# Patient Record
Sex: FEMALE | Race: WHITE | NOT HISPANIC OR LATINO | Employment: FULL TIME | URBAN - METROPOLITAN AREA
[De-identification: names, ages, dates, MRNs, and addresses within clinical notes are randomized per-mention and may not be internally consistent; named-entity substitution may affect disease eponyms.]

---

## 2017-10-19 ENCOUNTER — CONVERSION ENCOUNTER (OUTPATIENT)
Dept: OTHER | Facility: HOSPITAL | Age: 42
End: 2017-10-19

## 2018-05-31 ENCOUNTER — OFFICE VISIT CONVERTED (OUTPATIENT)
Dept: FAMILY MEDICINE CLINIC | Age: 43
End: 2018-05-31
Attending: NURSE PRACTITIONER

## 2018-06-28 ENCOUNTER — OFFICE VISIT CONVERTED (OUTPATIENT)
Dept: FAMILY MEDICINE CLINIC | Age: 43
End: 2018-06-28
Attending: NURSE PRACTITIONER

## 2018-07-12 ENCOUNTER — OFFICE VISIT CONVERTED (OUTPATIENT)
Dept: FAMILY MEDICINE CLINIC | Age: 43
End: 2018-07-12
Attending: NURSE PRACTITIONER

## 2018-07-18 ENCOUNTER — CONVERSION ENCOUNTER (OUTPATIENT)
Dept: CARDIOLOGY | Facility: CLINIC | Age: 43
End: 2018-07-18
Attending: INTERNAL MEDICINE

## 2018-10-02 ENCOUNTER — OFFICE VISIT CONVERTED (OUTPATIENT)
Dept: CARDIOLOGY | Facility: CLINIC | Age: 43
End: 2018-10-02
Attending: INTERNAL MEDICINE

## 2018-10-27 ENCOUNTER — OFFICE VISIT CONVERTED (OUTPATIENT)
Dept: FAMILY MEDICINE CLINIC | Age: 43
End: 2018-10-27
Attending: NURSE PRACTITIONER

## 2018-10-30 ENCOUNTER — OFFICE VISIT CONVERTED (OUTPATIENT)
Dept: FAMILY MEDICINE CLINIC | Age: 43
End: 2018-10-30
Attending: NURSE PRACTITIONER

## 2019-01-02 ENCOUNTER — CONVERSION ENCOUNTER (OUTPATIENT)
Dept: OTOLARYNGOLOGY | Facility: CLINIC | Age: 44
End: 2019-01-02

## 2019-01-02 ENCOUNTER — OFFICE VISIT CONVERTED (OUTPATIENT)
Dept: OTOLARYNGOLOGY | Facility: CLINIC | Age: 44
End: 2019-01-02
Attending: OTOLARYNGOLOGY

## 2019-02-27 ENCOUNTER — HOSPITAL ENCOUNTER (OUTPATIENT)
Dept: OTHER | Facility: HOSPITAL | Age: 44
Discharge: HOME | End: 2019-02-27

## 2019-03-08 ENCOUNTER — HOSPITAL ENCOUNTER (OUTPATIENT)
Dept: OTHER | Facility: HOSPITAL | Age: 44
Discharge: HOME | End: 2019-03-08

## 2019-03-08 ENCOUNTER — OFFICE VISIT CONVERTED (OUTPATIENT)
Dept: FAMILY MEDICINE CLINIC | Age: 44
End: 2019-03-08
Attending: NURSE PRACTITIONER

## 2019-03-08 LAB
25(OH)D3 SERPL-MCNC: 49 NG/ML (ref 30–100)
ALBUMIN SERPL-MCNC: 4 G/DL (ref 3.5–5)
ALBUMIN/GLOB SERPL: 1.3 {RATIO} (ref 1.4–2.6)
ALP SERPL-CCNC: 91 U/L (ref 42–98)
ALT SERPL-CCNC: 16 U/L (ref 10–40)
ANION GAP SERPL CALC-SCNC: 15 MMOL/L (ref 8–19)
AST SERPL-CCNC: 18 U/L (ref 15–50)
BASOPHILS # BLD MANUAL: 0.09 10*3/UL (ref 0–0.2)
BASOPHILS NFR BLD MANUAL: 0.7 % (ref 0–3)
BILIRUB SERPL-MCNC: 0.25 MG/DL (ref 0.2–1.3)
BUN SERPL-MCNC: 18 MG/DL (ref 5–25)
BUN/CREAT SERPL: 18 {RATIO} (ref 6–20)
CALCIUM SERPL-MCNC: 9.7 MG/DL (ref 8.7–10.4)
CHLORIDE SERPL-SCNC: 104 MMOL/L (ref 99–111)
CHOLEST SERPL-MCNC: 169 MG/DL (ref 107–200)
CHOLEST/HDLC SERPL: 3.4 {RATIO} (ref 3–6)
CONV CO2: 22 MMOL/L (ref 22–32)
CONV TOTAL PROTEIN: 7.2 G/DL (ref 6.3–8.2)
CREAT UR-MCNC: 0.99 MG/DL (ref 0.5–0.9)
DEPRECATED RDW RBC AUTO: 45.5 FL
EOSINOPHIL # BLD MANUAL: 0.37 10*3/UL (ref 0–0.7)
EOSINOPHIL NFR BLD MANUAL: 2.7 % (ref 0–7)
ERYTHROCYTE [DISTWIDTH] IN BLOOD BY AUTOMATED COUNT: 13.5 % (ref 11.5–14.5)
GFR SERPLBLD BASED ON 1.73 SQ M-ARVRAT: >60 ML/MIN/{1.73_M2}
GLOBULIN UR ELPH-MCNC: 3.2 G/DL (ref 2–3.5)
GLUCOSE SERPL-MCNC: 108 MG/DL (ref 65–99)
GRANS (ABSOLUTE): 9.39 10*3/UL (ref 2–8)
GRANS: 68.3 % (ref 30–85)
HBA1C MFR BLD: 15.3 G/DL (ref 12–16)
HCT VFR BLD AUTO: 44.8 % (ref 37–47)
HDLC SERPL-MCNC: 49 MG/DL (ref 40–60)
IMM GRANULOCYTES # BLD: 0.11 10*3/UL (ref 0–0.54)
IMM GRANULOCYTES NFR BLD: 0.8 % (ref 0–0.43)
LDLC SERPL CALC-MCNC: 86 MG/DL (ref 70–100)
LYMPHOCYTES # BLD MANUAL: 2.91 10*3/UL (ref 1–5)
LYMPHOCYTES NFR BLD MANUAL: 6.3 % (ref 3–10)
MAGNESIUM SERPL-MCNC: 1.67 MG/DL (ref 1.6–2.3)
MCH RBC QN AUTO: 30.5 PG (ref 27–31)
MCHC RBC AUTO-ENTMCNC: 34.2 G/DL (ref 33–37)
MCV RBC AUTO: 89.4 FL (ref 81–99)
MONOCYTES # BLD AUTO: 0.86 10*3/UL (ref 0.2–1.2)
OSMOLALITY SERPL CALC.SUM OF ELEC: 286 MOSM/KG (ref 273–304)
PLATELET # BLD AUTO: 233 10*3/UL (ref 130–400)
PMV BLD AUTO: 11.1 FL (ref 7.4–10.4)
POTASSIUM SERPL-SCNC: 4.4 MMOL/L (ref 3.5–5.3)
RBC # BLD AUTO: 5.01 10*6/UL (ref 4.2–5.4)
SODIUM SERPL-SCNC: 137 MMOL/L (ref 135–147)
TRIGL SERPL-MCNC: 169 MG/DL (ref 40–150)
TSH SERPL-ACNC: 0.88 M[IU]/L (ref 0.27–4.2)
VARIANT LYMPHS NFR BLD MANUAL: 21.2 % (ref 20–45)
VLDLC SERPL-MCNC: 34 MG/DL (ref 5–37)
WBC # BLD AUTO: 13.73 10*3/UL (ref 4.8–10.8)

## 2019-05-29 ENCOUNTER — HOSPITAL ENCOUNTER (OUTPATIENT)
Dept: OTHER | Facility: HOSPITAL | Age: 44
Discharge: HOME | End: 2019-05-29

## 2019-05-29 ENCOUNTER — OFFICE VISIT CONVERTED (OUTPATIENT)
Dept: FAMILY MEDICINE CLINIC | Age: 44
End: 2019-05-29
Attending: NURSE PRACTITIONER

## 2019-06-18 ENCOUNTER — HOSPITAL ENCOUNTER (OUTPATIENT)
Dept: OTHER | Facility: HOSPITAL | Age: 44
Discharge: HOME | End: 2019-06-18

## 2019-06-18 LAB
T4 FREE SERPL-MCNC: 1.6 NG/DL (ref 0.9–1.8)
TSH SERPL-ACNC: 1.36 M[IU]/L (ref 0.27–4.2)

## 2019-11-15 ENCOUNTER — HOSPITAL ENCOUNTER (OUTPATIENT)
Dept: MRI IMAGING | Facility: HOSPITAL | Age: 44
Discharge: HOME | End: 2019-11-15

## 2019-12-10 ENCOUNTER — OFFICE VISIT CONVERTED (OUTPATIENT)
Dept: FAMILY MEDICINE CLINIC | Age: 44
End: 2019-12-10
Attending: NURSE PRACTITIONER

## 2019-12-11 ENCOUNTER — HOSPITAL ENCOUNTER (OUTPATIENT)
Dept: OTHER | Facility: HOSPITAL | Age: 44
Discharge: HOME | End: 2019-12-11
Attending: NURSE PRACTITIONER

## 2019-12-13 LAB — BACTERIA SPEC AEROBE CULT: NORMAL

## 2020-06-08 ENCOUNTER — OFFICE VISIT CONVERTED (OUTPATIENT)
Dept: FAMILY MEDICINE CLINIC | Age: 45
End: 2020-06-08
Attending: NURSE PRACTITIONER

## 2020-06-08 ENCOUNTER — HOSPITAL ENCOUNTER (OUTPATIENT)
Dept: OTHER | Facility: HOSPITAL | Age: 45
Discharge: HOME | End: 2020-06-08
Attending: NURSE PRACTITIONER

## 2020-06-08 LAB
ALBUMIN SERPL-MCNC: 4 G/DL (ref 3.5–5)
ALBUMIN/GLOB SERPL: 1.3 {RATIO} (ref 1.4–2.6)
ALP SERPL-CCNC: 115 U/L (ref 42–98)
ALT SERPL-CCNC: 22 U/L (ref 10–40)
ANION GAP SERPL CALC-SCNC: 17 MMOL/L (ref 8–19)
AST SERPL-CCNC: 25 U/L (ref 15–50)
BASOPHILS # BLD MANUAL: 0.09 10*3/UL (ref 0–0.2)
BASOPHILS NFR BLD MANUAL: 1 % (ref 0–3)
BILIRUB SERPL-MCNC: 0.32 MG/DL (ref 0.2–1.3)
BUN SERPL-MCNC: 13 MG/DL (ref 5–25)
BUN/CREAT SERPL: 15 {RATIO} (ref 6–20)
CALCIUM SERPL-MCNC: 9.4 MG/DL (ref 8.7–10.4)
CHLORIDE SERPL-SCNC: 103 MMOL/L (ref 99–111)
CHOLEST SERPL-MCNC: 156 MG/DL (ref 107–200)
CHOLEST/HDLC SERPL: 3.8 {RATIO} (ref 3–6)
CONV CO2: 21 MMOL/L (ref 22–32)
CONV TOTAL PROTEIN: 7.2 G/DL (ref 6.3–8.2)
CREAT UR-MCNC: 0.87 MG/DL (ref 0.5–0.9)
DEPRECATED RDW RBC AUTO: 45.6 FL
EOSINOPHIL # BLD MANUAL: 0.34 10*3/UL (ref 0–0.7)
EOSINOPHIL NFR BLD MANUAL: 3.6 % (ref 0–7)
ERYTHROCYTE [DISTWIDTH] IN BLOOD BY AUTOMATED COUNT: 14 % (ref 11.5–14.5)
GFR SERPLBLD BASED ON 1.73 SQ M-ARVRAT: >60 ML/MIN/{1.73_M2}
GLOBULIN UR ELPH-MCNC: 3.2 G/DL (ref 2–3.5)
GLUCOSE SERPL-MCNC: 107 MG/DL (ref 65–99)
GRANS (ABSOLUTE): 5.63 10*3/UL (ref 2–8)
GRANS: 60.2 % (ref 30–85)
HBA1C MFR BLD: 15.4 G/DL (ref 12–16)
HCT VFR BLD AUTO: 46.8 % (ref 37–47)
HDLC SERPL-MCNC: 41 MG/DL (ref 40–60)
IMM GRANULOCYTES # BLD: 0.02 10*3/UL (ref 0–0.54)
IMM GRANULOCYTES NFR BLD: 0.2 % (ref 0–0.43)
LDLC SERPL CALC-MCNC: 92 MG/DL (ref 70–100)
LYMPHOCYTES # BLD MANUAL: 2.72 10*3/UL (ref 1–5)
LYMPHOCYTES NFR BLD MANUAL: 5.9 % (ref 3–10)
MCH RBC QN AUTO: 29.2 PG (ref 27–31)
MCHC RBC AUTO-ENTMCNC: 32.9 G/DL (ref 33–37)
MCV RBC AUTO: 88.6 FL (ref 81–99)
MONOCYTES # BLD AUTO: 0.55 10*3/UL (ref 0.2–1.2)
OSMOLALITY SERPL CALC.SUM OF ELEC: 285 MOSM/KG (ref 273–304)
PLATELET # BLD AUTO: 236 10*3/UL (ref 130–400)
PMV BLD AUTO: 11.4 FL (ref 7.4–10.4)
POTASSIUM SERPL-SCNC: 4.2 MMOL/L (ref 3.5–5.3)
RBC # BLD AUTO: 5.28 10*6/UL (ref 4.2–5.4)
SODIUM SERPL-SCNC: 137 MMOL/L (ref 135–147)
TRIGL SERPL-MCNC: 116 MG/DL (ref 40–150)
TSH SERPL-ACNC: 0.29 M[IU]/L (ref 0.27–4.2)
VARIANT LYMPHS NFR BLD MANUAL: 29.1 % (ref 20–45)
VLDLC SERPL-MCNC: 23 MG/DL (ref 5–37)
WBC # BLD AUTO: 9.35 10*3/UL (ref 4.8–10.8)

## 2020-06-17 ENCOUNTER — OFFICE VISIT CONVERTED (OUTPATIENT)
Dept: FAMILY MEDICINE CLINIC | Age: 45
End: 2020-06-17
Attending: NURSE PRACTITIONER

## 2020-07-27 ENCOUNTER — OFFICE VISIT CONVERTED (OUTPATIENT)
Dept: FAMILY MEDICINE CLINIC | Age: 45
End: 2020-07-27
Attending: FAMILY MEDICINE

## 2020-07-27 ENCOUNTER — HOSPITAL ENCOUNTER (OUTPATIENT)
Dept: OTHER | Facility: HOSPITAL | Age: 45
Discharge: HOME | End: 2020-07-27
Attending: FAMILY MEDICINE

## 2020-07-27 LAB
APPEARANCE UR: CLEAR
BILIRUB UR QL: NEGATIVE
COLOR UR: ABNORMAL
CONV BACTERIA: NEGATIVE
CONV COLLECTION SOURCE (UA): ABNORMAL
CONV UROBILINOGEN IN URINE BY AUTOMATED TEST STRIP: 1 {EHRLICHU}/DL (ref 0.1–1)
GLUCOSE UR QL: NEGATIVE MG/DL
HGB UR QL STRIP: ABNORMAL
KETONES UR QL STRIP: NEGATIVE MG/DL
LEUKOCYTE ESTERASE UR QL STRIP: ABNORMAL
NITRITE UR QL STRIP: POSITIVE
PH UR STRIP.AUTO: 6.5 [PH] (ref 5–8)
PROT UR QL: NEGATIVE MG/DL
RBC #/AREA URNS HPF: ABNORMAL /[HPF]
SP GR UR: 1 (ref 1–1.03)
SQUAMOUS SPT QL MICRO: ABNORMAL /[HPF]
WBC #/AREA URNS HPF: ABNORMAL /[HPF]

## 2020-07-30 LAB — BACTERIA UR CULT: NORMAL

## 2020-10-19 ENCOUNTER — HOSPITAL ENCOUNTER (OUTPATIENT)
Dept: OTHER | Facility: HOSPITAL | Age: 45
Discharge: HOME | End: 2020-10-19
Attending: NURSE PRACTITIONER

## 2020-10-19 ENCOUNTER — OFFICE VISIT CONVERTED (OUTPATIENT)
Dept: FAMILY MEDICINE CLINIC | Age: 45
End: 2020-10-19
Attending: NURSE PRACTITIONER

## 2020-10-21 LAB
ALP SERPL-CCNC: 106 U/L (ref 42–98)
ASO AB SERPL-ACNC: 36 [IU]/ML (ref 0–200)
CALCIUM SERPL-MCNC: 9 MG/DL (ref 8.7–10.4)
CONV RHEUMATOID FACTOR IGM: <10 [IU]/ML (ref 0–14)
CRP SERPL-MCNC: 6.3 MG/L (ref 0–5)
DSDNA AB SER-ACNC: NEGATIVE [IU]/ML
ENA AB SER IA-ACNC: NEGATIVE {RATIO}
ERYTHROCYTE [SEDIMENTATION RATE] IN BLOOD: 5 MM/H (ref 0–20)
PHOSPHATE SERPL-MCNC: 2.7 MG/DL (ref 2.4–4.5)
URATE SERPL-MCNC: 6.1 MG/DL (ref 2.5–7.5)

## 2020-10-29 ENCOUNTER — HOSPITAL ENCOUNTER (OUTPATIENT)
Dept: PHYSICAL THERAPY | Facility: CLINIC | Age: 45
Setting detail: RECURRING SERIES
Discharge: HOME | End: 2021-01-18
Attending: NURSE PRACTITIONER

## 2020-11-11 ENCOUNTER — OFFICE VISIT CONVERTED (OUTPATIENT)
Dept: FAMILY MEDICINE CLINIC | Age: 45
End: 2020-11-11
Attending: NURSE PRACTITIONER

## 2020-11-30 ENCOUNTER — OFFICE VISIT (OUTPATIENT)
Dept: ORTHOPEDIC SURGERY | Facility: CLINIC | Age: 45
End: 2020-11-30

## 2020-11-30 VITALS — WEIGHT: 270 LBS | BODY MASS INDEX: 43.39 KG/M2 | HEIGHT: 66 IN | TEMPERATURE: 98.2 F

## 2020-11-30 DIAGNOSIS — S83.222A PERIPHERAL TEAR OF MEDIAL MENISCUS OF LEFT KNEE AS CURRENT INJURY, INITIAL ENCOUNTER: Primary | ICD-10-CM

## 2020-11-30 PROCEDURE — 99204 OFFICE O/P NEW MOD 45 MIN: CPT | Performed by: ORTHOPAEDIC SURGERY

## 2020-11-30 RX ORDER — TRAMADOL HYDROCHLORIDE 50 MG/1
TABLET ORAL
COMMUNITY
End: 2021-01-27

## 2020-11-30 RX ORDER — DULOXETIN HYDROCHLORIDE 60 MG/1
60 CAPSULE, DELAYED RELEASE ORAL DAILY
COMMUNITY
Start: 2020-11-10 | End: 2021-12-07

## 2020-11-30 RX ORDER — TIZANIDINE 2 MG/1
2 TABLET ORAL AS NEEDED
COMMUNITY
End: 2022-08-01 | Stop reason: RX

## 2020-11-30 RX ORDER — LEVOTHYROXINE SODIUM 0.15 MG/1
150 TABLET ORAL DAILY
COMMUNITY
End: 2022-02-25 | Stop reason: SDUPTHER

## 2020-11-30 RX ORDER — OMEGA-3 FATTY ACIDS/FISH OIL 300-1000MG
800 CAPSULE ORAL AS NEEDED
COMMUNITY
End: 2021-11-01

## 2020-11-30 RX ORDER — DOXYCYCLINE 100 MG/1
CAPSULE ORAL
COMMUNITY
End: 2021-01-27

## 2020-11-30 RX ORDER — MELOXICAM 7.5 MG/1
7.5 TABLET ORAL DAILY
COMMUNITY
Start: 2020-11-20 | End: 2021-05-13

## 2020-11-30 RX ORDER — ESTRADIOL 0.1 MG/D
1 FILM, EXTENDED RELEASE TRANSDERMAL 2 TIMES WEEKLY
COMMUNITY
Start: 2020-11-28

## 2020-12-01 PROBLEM — S83.222A PERIPHERAL TEAR OF MEDIAL MENISCUS OF LEFT KNEE AS CURRENT INJURY: Status: ACTIVE | Noted: 2020-12-01

## 2020-12-01 RX ORDER — CEFAZOLIN SODIUM 2 G/100ML
2 INJECTION, SOLUTION INTRAVENOUS ONCE
Status: CANCELLED | OUTPATIENT
Start: 2021-01-29 | End: 2020-12-01

## 2021-01-27 ENCOUNTER — APPOINTMENT (OUTPATIENT)
Dept: PREADMISSION TESTING | Facility: HOSPITAL | Age: 46
End: 2021-01-27

## 2021-01-27 VITALS
DIASTOLIC BLOOD PRESSURE: 86 MMHG | OXYGEN SATURATION: 98 % | WEIGHT: 285.3 LBS | BODY MASS INDEX: 45.85 KG/M2 | TEMPERATURE: 98.5 F | HEART RATE: 98 BPM | RESPIRATION RATE: 16 BRPM | HEIGHT: 66 IN | SYSTOLIC BLOOD PRESSURE: 130 MMHG

## 2021-01-27 DIAGNOSIS — S83.222A PERIPHERAL TEAR OF MEDIAL MENISCUS OF LEFT KNEE AS CURRENT INJURY, INITIAL ENCOUNTER: ICD-10-CM

## 2021-01-27 LAB
ANION GAP SERPL CALCULATED.3IONS-SCNC: 9 MMOL/L (ref 5–15)
BILIRUB UR QL STRIP: NEGATIVE
BUN SERPL-MCNC: 16 MG/DL (ref 6–20)
BUN/CREAT SERPL: 19 (ref 7–25)
CALCIUM SPEC-SCNC: 9.4 MG/DL (ref 8.6–10.5)
CHLORIDE SERPL-SCNC: 106 MMOL/L (ref 98–107)
CLARITY UR: CLEAR
CO2 SERPL-SCNC: 25 MMOL/L (ref 22–29)
COLOR UR: YELLOW
CREAT SERPL-MCNC: 0.84 MG/DL (ref 0.57–1)
DEPRECATED RDW RBC AUTO: 43.2 FL (ref 37–54)
ERYTHROCYTE [DISTWIDTH] IN BLOOD BY AUTOMATED COUNT: 13.1 % (ref 12.3–15.4)
GFR SERPL CREATININE-BSD FRML MDRD: 73 ML/MIN/1.73
GLUCOSE SERPL-MCNC: 112 MG/DL (ref 65–99)
GLUCOSE UR STRIP-MCNC: NEGATIVE MG/DL
HCT VFR BLD AUTO: 43.8 % (ref 34–46.6)
HGB BLD-MCNC: 14.6 G/DL (ref 12–15.9)
HGB UR QL STRIP.AUTO: NEGATIVE
KETONES UR QL STRIP: ABNORMAL
LEUKOCYTE ESTERASE UR QL STRIP.AUTO: NEGATIVE
MCH RBC QN AUTO: 30.1 PG (ref 26.6–33)
MCHC RBC AUTO-ENTMCNC: 33.3 G/DL (ref 31.5–35.7)
MCV RBC AUTO: 90.3 FL (ref 79–97)
NITRITE UR QL STRIP: NEGATIVE
PH UR STRIP.AUTO: 6 [PH] (ref 5–8)
PLATELET # BLD AUTO: 239 10*3/MM3 (ref 140–450)
PMV BLD AUTO: 11.5 FL (ref 6–12)
POTASSIUM SERPL-SCNC: 4.9 MMOL/L (ref 3.5–5.2)
PROT UR QL STRIP: NEGATIVE
RBC # BLD AUTO: 4.85 10*6/MM3 (ref 3.77–5.28)
SODIUM SERPL-SCNC: 140 MMOL/L (ref 136–145)
SP GR UR STRIP: 1.02 (ref 1–1.03)
UROBILINOGEN UR QL STRIP: ABNORMAL
WBC # BLD AUTO: 7.94 10*3/MM3 (ref 3.4–10.8)

## 2021-01-27 PROCEDURE — 80048 BASIC METABOLIC PNL TOTAL CA: CPT

## 2021-01-27 PROCEDURE — 36415 COLL VENOUS BLD VENIPUNCTURE: CPT

## 2021-01-27 PROCEDURE — U0004 COV-19 TEST NON-CDC HGH THRU: HCPCS

## 2021-01-27 PROCEDURE — C9803 HOPD COVID-19 SPEC COLLECT: HCPCS

## 2021-01-27 PROCEDURE — 85027 COMPLETE CBC AUTOMATED: CPT

## 2021-01-27 PROCEDURE — 81003 URINALYSIS AUTO W/O SCOPE: CPT

## 2021-01-28 LAB — SARS-COV-2 RNA RESP QL NAA+PROBE: NOT DETECTED

## 2021-01-29 ENCOUNTER — ANESTHESIA (OUTPATIENT)
Dept: PERIOP | Facility: HOSPITAL | Age: 46
End: 2021-01-29

## 2021-01-29 ENCOUNTER — HOSPITAL ENCOUNTER (OUTPATIENT)
Facility: HOSPITAL | Age: 46
Setting detail: HOSPITAL OUTPATIENT SURGERY
Discharge: HOME | End: 2021-01-29
Attending: ORTHOPAEDIC SURGERY | Admitting: ORTHOPAEDIC SURGERY

## 2021-01-29 ENCOUNTER — ANESTHESIA EVENT (OUTPATIENT)
Dept: PERIOP | Facility: HOSPITAL | Age: 46
End: 2021-01-29

## 2021-01-29 VITALS
TEMPERATURE: 97.8 F | SYSTOLIC BLOOD PRESSURE: 116 MMHG | OXYGEN SATURATION: 98 % | DIASTOLIC BLOOD PRESSURE: 82 MMHG | HEART RATE: 108 BPM | RESPIRATION RATE: 16 BRPM

## 2021-01-29 DIAGNOSIS — S83.222A PERIPHERAL TEAR OF MEDIAL MENISCUS OF LEFT KNEE AS CURRENT INJURY, INITIAL ENCOUNTER: ICD-10-CM

## 2021-01-29 DIAGNOSIS — Z98.890 S/P ARTHROSCOPIC KNEE SURGERY: Primary | ICD-10-CM

## 2021-01-29 PROCEDURE — 25010000002 DEXAMETHASONE PER 1 MG: Performed by: NURSE ANESTHETIST, CERTIFIED REGISTERED

## 2021-01-29 PROCEDURE — 25010000002 PROPOFOL 10 MG/ML EMULSION: Performed by: NURSE ANESTHETIST, CERTIFIED REGISTERED

## 2021-01-29 PROCEDURE — S0260 H&P FOR SURGERY: HCPCS | Performed by: ORTHOPAEDIC SURGERY

## 2021-01-29 PROCEDURE — 29881 ARTHRS KNE SRG MNISECTMY M/L: CPT | Performed by: ORTHOPAEDIC SURGERY

## 2021-01-29 PROCEDURE — 25010000002 FENTANYL CITRATE (PF) 100 MCG/2ML SOLUTION: Performed by: NURSE ANESTHETIST, CERTIFIED REGISTERED

## 2021-01-29 PROCEDURE — 25010000003 BUPIVACAINE LIPOSOME 1.3 % SUSPENSION: Performed by: ORTHOPAEDIC SURGERY

## 2021-01-29 PROCEDURE — 25010000002 MIDAZOLAM PER 1 MG: Performed by: ANESTHESIOLOGY

## 2021-01-29 PROCEDURE — 25010000003 CEFAZOLIN IN DEXTROSE 2-4 GM/100ML-% SOLUTION: Performed by: ORTHOPAEDIC SURGERY

## 2021-01-29 PROCEDURE — C9290 INJ, BUPIVACAINE LIPOSOME: HCPCS | Performed by: ORTHOPAEDIC SURGERY

## 2021-01-29 PROCEDURE — 25010000002 SUCCINYLCHOLINE PER 20 MG: Performed by: NURSE ANESTHETIST, CERTIFIED REGISTERED

## 2021-01-29 PROCEDURE — 25010000002 PHENYLEPHRINE PER 1 ML: Performed by: NURSE ANESTHETIST, CERTIFIED REGISTERED

## 2021-01-29 PROCEDURE — 25010000002 ONDANSETRON PER 1 MG: Performed by: NURSE ANESTHETIST, CERTIFIED REGISTERED

## 2021-01-29 PROCEDURE — 25010000002 HYDROMORPHONE PER 4 MG: Performed by: NURSE ANESTHETIST, CERTIFIED REGISTERED

## 2021-01-29 RX ORDER — PROMETHAZINE HYDROCHLORIDE 25 MG/1
25 SUPPOSITORY RECTAL ONCE AS NEEDED
Status: DISCONTINUED | OUTPATIENT
Start: 2021-01-29 | End: 2021-01-29 | Stop reason: HOSPADM

## 2021-01-29 RX ORDER — FENTANYL CITRATE 50 UG/ML
INJECTION, SOLUTION INTRAMUSCULAR; INTRAVENOUS AS NEEDED
Status: DISCONTINUED | OUTPATIENT
Start: 2021-01-29 | End: 2021-01-29 | Stop reason: SURG

## 2021-01-29 RX ORDER — HYDROMORPHONE HCL 110MG/55ML
PATIENT CONTROLLED ANALGESIA SYRINGE INTRAVENOUS AS NEEDED
Status: DISCONTINUED | OUTPATIENT
Start: 2021-01-29 | End: 2021-01-29 | Stop reason: SURG

## 2021-01-29 RX ORDER — NALOXONE HCL 0.4 MG/ML
0.2 VIAL (ML) INJECTION AS NEEDED
Status: DISCONTINUED | OUTPATIENT
Start: 2021-01-29 | End: 2021-01-29 | Stop reason: HOSPADM

## 2021-01-29 RX ORDER — HYDROCODONE BITARTRATE AND ACETAMINOPHEN 7.5; 325 MG/1; MG/1
1 TABLET ORAL ONCE AS NEEDED
Status: COMPLETED | OUTPATIENT
Start: 2021-01-29 | End: 2021-01-29

## 2021-01-29 RX ORDER — IPRATROPIUM BROMIDE AND ALBUTEROL SULFATE 2.5; .5 MG/3ML; MG/3ML
3 SOLUTION RESPIRATORY (INHALATION) ONCE AS NEEDED
Status: DISCONTINUED | OUTPATIENT
Start: 2021-01-29 | End: 2021-01-29 | Stop reason: HOSPADM

## 2021-01-29 RX ORDER — SODIUM CHLORIDE, SODIUM LACTATE, POTASSIUM CHLORIDE, CALCIUM CHLORIDE 600; 310; 30; 20 MG/100ML; MG/100ML; MG/100ML; MG/100ML
9 INJECTION, SOLUTION INTRAVENOUS CONTINUOUS
Status: DISCONTINUED | OUTPATIENT
Start: 2021-01-29 | End: 2021-01-29 | Stop reason: HOSPADM

## 2021-01-29 RX ORDER — HYDROCODONE BITARTRATE AND ACETAMINOPHEN 7.5; 325 MG/1; MG/1
1 TABLET ORAL EVERY 6 HOURS PRN
Qty: 40 TABLET | Refills: 0 | Status: SHIPPED | OUTPATIENT
Start: 2021-01-29 | End: 2021-02-05

## 2021-01-29 RX ORDER — SUCCINYLCHOLINE CHLORIDE 20 MG/ML
INJECTION INTRAMUSCULAR; INTRAVENOUS AS NEEDED
Status: DISCONTINUED | OUTPATIENT
Start: 2021-01-29 | End: 2021-01-29 | Stop reason: SURG

## 2021-01-29 RX ORDER — DEXAMETHASONE SODIUM PHOSPHATE 10 MG/ML
INJECTION INTRAMUSCULAR; INTRAVENOUS AS NEEDED
Status: DISCONTINUED | OUTPATIENT
Start: 2021-01-29 | End: 2021-01-29 | Stop reason: SURG

## 2021-01-29 RX ORDER — HYDROMORPHONE HYDROCHLORIDE 1 MG/ML
0.5 INJECTION, SOLUTION INTRAMUSCULAR; INTRAVENOUS; SUBCUTANEOUS
Status: DISCONTINUED | OUTPATIENT
Start: 2021-01-29 | End: 2021-01-29 | Stop reason: HOSPADM

## 2021-01-29 RX ORDER — MIDAZOLAM HYDROCHLORIDE 1 MG/ML
1 INJECTION INTRAMUSCULAR; INTRAVENOUS
Status: DISCONTINUED | OUTPATIENT
Start: 2021-01-29 | End: 2021-01-29 | Stop reason: HOSPADM

## 2021-01-29 RX ORDER — SCOLOPAMINE TRANSDERMAL SYSTEM 1 MG/1
1 PATCH, EXTENDED RELEASE TRANSDERMAL CONTINUOUS
Status: DISCONTINUED | OUTPATIENT
Start: 2021-01-29 | End: 2021-01-29 | Stop reason: HOSPADM

## 2021-01-29 RX ORDER — FLUMAZENIL 0.1 MG/ML
0.2 INJECTION INTRAVENOUS AS NEEDED
Status: DISCONTINUED | OUTPATIENT
Start: 2021-01-29 | End: 2021-01-29 | Stop reason: HOSPADM

## 2021-01-29 RX ORDER — LIDOCAINE HYDROCHLORIDE 20 MG/ML
INJECTION, SOLUTION INFILTRATION; PERINEURAL AS NEEDED
Status: DISCONTINUED | OUTPATIENT
Start: 2021-01-29 | End: 2021-01-29 | Stop reason: SURG

## 2021-01-29 RX ORDER — ASPIRIN 325 MG
325 TABLET, DELAYED RELEASE (ENTERIC COATED) ORAL DAILY
Qty: 30 TABLET | Refills: 0 | Status: SHIPPED | OUTPATIENT
Start: 2021-01-29

## 2021-01-29 RX ORDER — FAMOTIDINE 10 MG/ML
20 INJECTION, SOLUTION INTRAVENOUS ONCE
Status: COMPLETED | OUTPATIENT
Start: 2021-01-29 | End: 2021-01-29

## 2021-01-29 RX ORDER — PROPOFOL 10 MG/ML
VIAL (ML) INTRAVENOUS AS NEEDED
Status: DISCONTINUED | OUTPATIENT
Start: 2021-01-29 | End: 2021-01-29 | Stop reason: SURG

## 2021-01-29 RX ORDER — OXYCODONE AND ACETAMINOPHEN 7.5; 325 MG/1; MG/1
1 TABLET ORAL ONCE AS NEEDED
Status: DISCONTINUED | OUTPATIENT
Start: 2021-01-29 | End: 2021-01-29 | Stop reason: HOSPADM

## 2021-01-29 RX ORDER — DIPHENHYDRAMINE HCL 25 MG
25 CAPSULE ORAL
Status: DISCONTINUED | OUTPATIENT
Start: 2021-01-29 | End: 2021-01-29 | Stop reason: HOSPADM

## 2021-01-29 RX ORDER — DOCUSATE SODIUM 100 MG/1
100 CAPSULE, LIQUID FILLED ORAL 2 TIMES DAILY
Qty: 60 CAPSULE | Refills: 0 | Status: SHIPPED | OUTPATIENT
Start: 2021-01-29 | End: 2021-11-01

## 2021-01-29 RX ORDER — SODIUM CHLORIDE 0.9 % (FLUSH) 0.9 %
3-10 SYRINGE (ML) INJECTION AS NEEDED
Status: DISCONTINUED | OUTPATIENT
Start: 2021-01-29 | End: 2021-01-29 | Stop reason: HOSPADM

## 2021-01-29 RX ORDER — LABETALOL HYDROCHLORIDE 5 MG/ML
5 INJECTION, SOLUTION INTRAVENOUS
Status: DISCONTINUED | OUTPATIENT
Start: 2021-01-29 | End: 2021-01-29 | Stop reason: HOSPADM

## 2021-01-29 RX ORDER — FENTANYL CITRATE 50 UG/ML
50 INJECTION, SOLUTION INTRAMUSCULAR; INTRAVENOUS
Status: DISCONTINUED | OUTPATIENT
Start: 2021-01-29 | End: 2021-01-29 | Stop reason: HOSPADM

## 2021-01-29 RX ORDER — CEFAZOLIN SODIUM 2 G/100ML
2 INJECTION, SOLUTION INTRAVENOUS ONCE
Status: COMPLETED | OUTPATIENT
Start: 2021-01-29 | End: 2021-01-29

## 2021-01-29 RX ORDER — SODIUM CHLORIDE 0.9 % (FLUSH) 0.9 %
3 SYRINGE (ML) INJECTION EVERY 12 HOURS SCHEDULED
Status: DISCONTINUED | OUTPATIENT
Start: 2021-01-29 | End: 2021-01-29 | Stop reason: HOSPADM

## 2021-01-29 RX ORDER — DIPHENHYDRAMINE HYDROCHLORIDE 50 MG/ML
12.5 INJECTION INTRAMUSCULAR; INTRAVENOUS
Status: DISCONTINUED | OUTPATIENT
Start: 2021-01-29 | End: 2021-01-29 | Stop reason: HOSPADM

## 2021-01-29 RX ORDER — GABAPENTIN 300 MG/1
300 CAPSULE ORAL ONCE
Status: COMPLETED | OUTPATIENT
Start: 2021-01-29 | End: 2021-01-29

## 2021-01-29 RX ORDER — MIDAZOLAM HYDROCHLORIDE 1 MG/ML
2 INJECTION INTRAMUSCULAR; INTRAVENOUS
Status: DISCONTINUED | OUTPATIENT
Start: 2021-01-29 | End: 2021-01-29 | Stop reason: HOSPADM

## 2021-01-29 RX ORDER — ACETAMINOPHEN 500 MG
500 TABLET ORAL ONCE
Status: COMPLETED | OUTPATIENT
Start: 2021-01-29 | End: 2021-01-29

## 2021-01-29 RX ORDER — GLYCOPYRROLATE 0.2 MG/ML
INJECTION INTRAMUSCULAR; INTRAVENOUS AS NEEDED
Status: DISCONTINUED | OUTPATIENT
Start: 2021-01-29 | End: 2021-01-29 | Stop reason: SURG

## 2021-01-29 RX ORDER — PROMETHAZINE HYDROCHLORIDE 25 MG/1
25 TABLET ORAL ONCE AS NEEDED
Status: DISCONTINUED | OUTPATIENT
Start: 2021-01-29 | End: 2021-01-29 | Stop reason: HOSPADM

## 2021-01-29 RX ORDER — SODIUM CHLORIDE, SODIUM LACTATE, POTASSIUM CHLORIDE, AND CALCIUM CHLORIDE .6; .31; .03; .02 G/100ML; G/100ML; G/100ML; G/100ML
IRRIGANT IRRIGATION AS NEEDED
Status: DISCONTINUED | OUTPATIENT
Start: 2021-01-29 | End: 2021-01-29 | Stop reason: HOSPADM

## 2021-01-29 RX ORDER — ONDANSETRON 2 MG/ML
4 INJECTION INTRAMUSCULAR; INTRAVENOUS ONCE AS NEEDED
Status: DISCONTINUED | OUTPATIENT
Start: 2021-01-29 | End: 2021-01-29 | Stop reason: HOSPADM

## 2021-01-29 RX ORDER — ONDANSETRON 2 MG/ML
INJECTION INTRAMUSCULAR; INTRAVENOUS AS NEEDED
Status: DISCONTINUED | OUTPATIENT
Start: 2021-01-29 | End: 2021-01-29 | Stop reason: SURG

## 2021-01-29 RX ORDER — EPHEDRINE SULFATE 50 MG/ML
5 INJECTION, SOLUTION INTRAVENOUS ONCE AS NEEDED
Status: DISCONTINUED | OUTPATIENT
Start: 2021-01-29 | End: 2021-01-29 | Stop reason: HOSPADM

## 2021-01-29 RX ADMIN — SCOPALAMINE 1 PATCH: 1 PATCH, EXTENDED RELEASE TRANSDERMAL at 07:46

## 2021-01-29 RX ADMIN — HYDROMORPHONE HYDROCHLORIDE 0.5 MG: 2 INJECTION, SOLUTION INTRAMUSCULAR; INTRAVENOUS; SUBCUTANEOUS at 09:29

## 2021-01-29 RX ADMIN — SODIUM CHLORIDE, POTASSIUM CHLORIDE, SODIUM LACTATE AND CALCIUM CHLORIDE: 600; 310; 30; 20 INJECTION, SOLUTION INTRAVENOUS at 10:04

## 2021-01-29 RX ADMIN — MIDAZOLAM 2 MG: 1 INJECTION INTRAMUSCULAR; INTRAVENOUS at 07:57

## 2021-01-29 RX ADMIN — PHENYLEPHRINE HYDROCHLORIDE 150 MCG: 10 INJECTION INTRAVENOUS at 09:39

## 2021-01-29 RX ADMIN — FENTANYL CITRATE 50 MCG: 50 INJECTION, SOLUTION INTRAMUSCULAR; INTRAVENOUS at 10:38

## 2021-01-29 RX ADMIN — PROPOFOL 50 MG: 10 INJECTION, EMULSION INTRAVENOUS at 09:28

## 2021-01-29 RX ADMIN — SUCCINYLCHOLINE CHLORIDE 160 MG: 20 INJECTION, SOLUTION INTRAMUSCULAR; INTRAVENOUS; PARENTERAL at 09:00

## 2021-01-29 RX ADMIN — SODIUM CHLORIDE, POTASSIUM CHLORIDE, SODIUM LACTATE AND CALCIUM CHLORIDE 9 ML/HR: 600; 310; 30; 20 INJECTION, SOLUTION INTRAVENOUS at 07:57

## 2021-01-29 RX ADMIN — HYDROCODONE BITARTRATE AND ACETAMINOPHEN 1 TABLET: 7.5; 325 TABLET ORAL at 11:35

## 2021-01-29 RX ADMIN — ONDANSETRON HYDROCHLORIDE 4 MG: 2 SOLUTION INTRAMUSCULAR; INTRAVENOUS at 10:04

## 2021-01-29 RX ADMIN — HYDROMORPHONE HYDROCHLORIDE 0.5 MG: 1 INJECTION, SOLUTION INTRAMUSCULAR; INTRAVENOUS; SUBCUTANEOUS at 11:06

## 2021-01-29 RX ADMIN — FENTANYL CITRATE 50 MCG: 50 INJECTION, SOLUTION INTRAMUSCULAR; INTRAVENOUS at 10:45

## 2021-01-29 RX ADMIN — CEFAZOLIN SODIUM 2 G: 2 INJECTION, SOLUTION INTRAVENOUS at 09:06

## 2021-01-29 RX ADMIN — GABAPENTIN 300 MG: 300 CAPSULE ORAL at 07:47

## 2021-01-29 RX ADMIN — ACETAMINOPHEN 500 MG: 500 TABLET, FILM COATED ORAL at 07:47

## 2021-01-29 RX ADMIN — FAMOTIDINE 20 MG: 10 INJECTION INTRAVENOUS at 07:57

## 2021-01-29 RX ADMIN — PROPOFOL 200 MG: 10 INJECTION, EMULSION INTRAVENOUS at 09:00

## 2021-01-29 RX ADMIN — DEXAMETHASONE SODIUM PHOSPHATE 8 MG: 10 INJECTION INTRAMUSCULAR; INTRAVENOUS at 09:04

## 2021-01-29 RX ADMIN — FENTANYL CITRATE 50 MCG: 50 INJECTION INTRAMUSCULAR; INTRAVENOUS at 09:23

## 2021-01-29 RX ADMIN — GLYCOPYRROLATE 0.1 MG: 0.2 INJECTION INTRAMUSCULAR; INTRAVENOUS at 09:00

## 2021-01-29 RX ADMIN — HYDROMORPHONE HYDROCHLORIDE 0.5 MG: 1 INJECTION, SOLUTION INTRAMUSCULAR; INTRAVENOUS; SUBCUTANEOUS at 12:15

## 2021-01-29 RX ADMIN — PROPOFOL 50 MG: 10 INJECTION, EMULSION INTRAVENOUS at 09:26

## 2021-01-29 RX ADMIN — PHENYLEPHRINE HYDROCHLORIDE 150 MCG: 10 INJECTION INTRAVENOUS at 09:49

## 2021-01-29 RX ADMIN — FENTANYL CITRATE 50 MCG: 50 INJECTION INTRAMUSCULAR; INTRAVENOUS at 09:00

## 2021-01-29 RX ADMIN — PHENYLEPHRINE HYDROCHLORIDE 100 MCG: 10 INJECTION INTRAVENOUS at 09:16

## 2021-01-29 RX ADMIN — LIDOCAINE HYDROCHLORIDE 100 MG: 20 INJECTION, SOLUTION INFILTRATION; PERINEURAL at 09:00

## 2021-01-29 NOTE — ANESTHESIA POSTPROCEDURE EVALUATION
Patient: Rosibel Baker    Procedure Summary     Date: 01/29/21 Room / Location:  [facility]  [facility]  [facility]    Anesthesia Start: 0856 Anesthesia Stop: 1023    Procedure: KNEE ARTHROSCOPY, PARTIAL MEDIAL MENISECTOMY (Left Knee) Diagnosis:       Peripheral tear of medial meniscus of left knee as current injury, initial encounter      (Peripheral tear of medial meniscus of left knee as current injury, initial encounter [S83.222A])    Surgeon: Roly Godoy MD Provider: Shawn Yin MD    Anesthesia Type: general ASA Status: 3          Anesthesia Type: general    Vitals  Vitals Value Taken Time   /94 01/29/21 1145   Temp 36.6 °C (97.8 °F) 01/29/21 1145   Pulse 73 01/29/21 1154   Resp 14 01/29/21 1145   SpO2 97 % 01/29/21 1154   Vitals shown include unvalidated device data.        Post Anesthesia Care and Evaluation    Patient location during evaluation: bedside  Patient participation: complete - patient participated  Level of consciousness: awake  Pain management: adequate  Airway patency: patent  Anesthetic complications: No anesthetic complications    Cardiovascular status: acceptable  Respiratory status: acceptable  Hydration status: acceptable    Comments: */94 (BP Location: Left arm, Patient Position: Lying)   Pulse 73   Temp 36.6 °C (97.8 °F) (Temporal)   Resp 14   SpO2 97%

## 2021-01-29 NOTE — ANESTHESIA PROCEDURE NOTES
Airway  Urgency: elective    Date/Time: 1/29/2021 9:02 AM  Airway not difficult    General Information and Staff    Patient location during procedure: OR  Anesthesiologist: Shawn Yin MD  CRNA: Dariana Alcala CRNA    Indications and Patient Condition  Indications for airway management: airway protection    Preoxygenated: yes  MILS maintained throughout  Mask difficulty assessment: 2 - vent by mask + OA or adjuvant +/- NMBA    Final Airway Details  Final airway type: endotracheal airway      Successful airway: ETT  Cuffed: yes   Successful intubation technique: direct laryngoscopy  Facilitating devices/methods: intubating stylet and cricoid pressure  Endotracheal tube insertion site: oral  Blade: Krista  Blade size: 3  ETT size (mm): 7.0  Cormack-Lehane Classification: grade IIa - partial view of glottis  Placement verified by: chest auscultation and capnometry   Cuff volume (mL): 8  Measured from: teeth  ETT/EBT  to teeth (cm): 21  Number of attempts at approach: 1  Assessment: lips, teeth, and gum same as pre-op and atraumatic intubation

## 2021-01-29 NOTE — ANESTHESIA PREPROCEDURE EVALUATION
Anesthesia Evaluation     history of anesthetic complications: PONV  NPO Solid Status: > 8 hours  NPO Liquid Status: > 2 hours           Airway   Mallampati: II  Neck ROM: full  no difficulty expected  Dental - normal exam     Pulmonary - normal exam   (+) a smoker Current Abstained day of surgery,   (-) COPD, asthma, sleep apnea    PE comment: nonlabored  Cardiovascular - negative cardio ROS and normal exam    Rhythm: regular  Rate: normal    (-) hypertension, valvular problems/murmurs, past MI, CAD, dysrhythmias, angina      Neuro/Psych  (+) psychiatric history Anxiety and Depression,     (-) seizures, TIA, CVA  GI/Hepatic/Renal/Endo    (+) morbid obesity,  renal disease stones, thyroid problem hypothyroidism  (-) GERD, liver disease, diabetes    Musculoskeletal     (+) arthralgias,   Abdominal    Substance History      OB/GYN          Other                        Anesthesia Plan    ASA 3     general     intravenous induction     Anesthetic plan, all risks, benefits, and alternatives have been provided, discussed and informed consent has been obtained with: patient.

## 2021-02-05 ENCOUNTER — OFFICE VISIT (OUTPATIENT)
Dept: ORTHOPEDIC SURGERY | Facility: CLINIC | Age: 46
End: 2021-02-05

## 2021-02-05 VITALS — TEMPERATURE: 98 F | BODY MASS INDEX: 45.8 KG/M2 | WEIGHT: 285 LBS | HEIGHT: 66 IN

## 2021-02-05 DIAGNOSIS — Z98.890 S/P ARTHROSCOPIC KNEE SURGERY: Primary | ICD-10-CM

## 2021-02-05 DIAGNOSIS — Z98.890 S/P ARTHROSCOPIC KNEE SURGERY: ICD-10-CM

## 2021-02-05 DIAGNOSIS — S83.222D PERIPHERAL TEAR OF MEDIAL MENISCUS OF LEFT KNEE AS CURRENT INJURY, SUBSEQUENT ENCOUNTER: ICD-10-CM

## 2021-02-05 PROCEDURE — 99024 POSTOP FOLLOW-UP VISIT: CPT | Performed by: PHYSICIAN ASSISTANT

## 2021-02-05 NOTE — PROGRESS NOTES
OTHER POST OP GLOBAL     NAME: Rosibel Baker  ?  : 1975  ?  MRN: [id number]    Chief Complaint   Patient presents with   • Left Knee - Follow-up, Pain   • Post-op Knee     ?  Date of surgery: 21    HPI:   Patient returns today for 1 week follow up of left knee arthroscopy. Arthroscopic portals healing nicely/as expected with no signs of infection. Patient reports doing well with no unusual complaints. Appears to be progressing appropriately. She does need a refill on her pain medication.      Ortho Exam:   LEFT knee   Patient is 1 week(s) post knee arthoscopy.   Arthroscopic portals are clean and healing well.   Mild effusion is noted.   There is no evidence of a deep seated joint infection.   Range of motion is 0-90 degrees of flexion.   Neurovascular status is intact.   There is no anterior or posterior instability.   The ACL function appears to be well preserved.   Gait is cautious and slightly antalgic but otherwise fairly normal.   The calf is soft and non-tender and there is no clinical evidence of a DVT.      Assessment:  Diagnoses and all orders for this visit:    1. S/P arthroscopic knee surgery (Primary)    2. Peripheral tear of medial meniscus of left knee as current injury, subsequent encounter          Plan   • Incision care  • Continue ice as needed  • Gentle active ROM  • Stretching and strengthening exercises  • Alternate Ibuprofen and Tylenol as needed  • Fall precautions  • Follow up in 4-6 week(s)     Date of encounter: 2021  Chivo Sullivan PA-C    Electronically signed by Chivo Sullivan PA-C, 21, 10:19 AM EST.

## 2021-02-06 RX ORDER — HYDROCODONE BITARTRATE AND ACETAMINOPHEN 7.5; 325 MG/1; MG/1
1 TABLET ORAL EVERY 6 HOURS PRN
Qty: 40 TABLET | Refills: 0 | Status: SHIPPED | OUTPATIENT
Start: 2021-02-06 | End: 2021-11-01

## 2021-03-04 ENCOUNTER — OFFICE VISIT (OUTPATIENT)
Dept: ORTHOPEDIC SURGERY | Facility: CLINIC | Age: 46
End: 2021-03-04

## 2021-03-04 VITALS — WEIGHT: 285 LBS | HEIGHT: 65 IN | TEMPERATURE: 97.8 F | BODY MASS INDEX: 47.48 KG/M2

## 2021-03-04 DIAGNOSIS — Z98.890 S/P ARTHROSCOPIC KNEE SURGERY: Primary | ICD-10-CM

## 2021-03-04 PROCEDURE — 99024 POSTOP FOLLOW-UP VISIT: CPT | Performed by: ORTHOPAEDIC SURGERY

## 2021-03-04 NOTE — PROGRESS NOTES
"Chief Complaint  Follow-up and Post-op of the Left Knee    Subjective          Rosibel Baker presents to [facility]  History of Present Illness patient is following up on a knee arthroscopy on the left side.  She had a partial medial meniscectomy and a chondroplasty was performed in addition to the partial meniscectomy.  She has done quite well postoperatively.  Her range of motion continues to improve.  \"My knee is much better than before \".  We have discussed about crosstraining programs and regular exercise.       Objective   Vital Signs:   Temp 97.8 °F (36.6 °C)   Ht 165.1 cm (65\")   Wt 129 kg (285 lb)   BMI 47.43 kg/m²     Physical Exam left knee. Patient is 6 week(s) post knee arthoscopy. Arthroscopic portals are clean and healing well. Mild effusion is noted. There is no evidence of a deep seated joint infection. Range of motion is 0-125 degrees of flexion. Neurovascular status is intact. There is no anterior or posterior instability. The ACL function appears to be well preserved. Gait is cautious and slightly antalgic but otherwise fairly normal. The sharp, stabbing pain that the patient had prior to the surgery has completely settled down. The calf is soft and non-tender and there is no clinical evidence of a DVT.  Result Review :                 Assessment and Plan    Diagnoses and all orders for this visit:    1. S/P arthroscopic knee surgery (Primary)  -     Ambulatory Referral to Physical Therapy Evaluate and treat        Follow Up   Continue with postoperative physical therapy protocols for the knee arthroscopy.  Note for physical therapy given to the patient.  Calcium and vitamin D for bone health.  Discussed with the patient about weight loss programs to help improve her quality of life and to improve her mobility in general.  Follow-up in 8 weeks for reevaluation.  Patient was given instructions and counseling regarding her condition or for health maintenance advice. " Please see specific information pulled into the AVS if appropriate.

## 2021-04-01 ENCOUNTER — OFFICE VISIT (OUTPATIENT)
Dept: ORTHOPEDIC SURGERY | Facility: CLINIC | Age: 46
End: 2021-04-01

## 2021-04-01 VITALS — WEIGHT: 278 LBS | TEMPERATURE: 96.2 F | HEIGHT: 66 IN | BODY MASS INDEX: 44.68 KG/M2

## 2021-04-01 DIAGNOSIS — Z98.890 S/P ARTHROSCOPIC KNEE SURGERY: Primary | ICD-10-CM

## 2021-04-01 DIAGNOSIS — S83.222D PERIPHERAL TEAR OF MEDIAL MENISCUS OF LEFT KNEE AS CURRENT INJURY, SUBSEQUENT ENCOUNTER: ICD-10-CM

## 2021-04-01 PROCEDURE — 99024 POSTOP FOLLOW-UP VISIT: CPT | Performed by: ORTHOPAEDIC SURGERY

## 2021-04-01 RX ORDER — PHENTERMINE HYDROCHLORIDE 37.5 MG/1
TABLET ORAL
COMMUNITY
Start: 2021-03-19 | End: 2021-11-01

## 2021-04-01 NOTE — PROGRESS NOTES
OTHER POST OP GLOBAL     NAME: Rosibel Baker  ?  : 1975  ?  MRN: [id number]    Chief Complaint   Patient presents with   • Left Knee - Follow-up, Pain   • Post-op Knee     ?  Date of surgery: 21    HPI:   Patient returns today for 9 week follow up of left partial medial menisectomy. Arthroscopic portals healed nicely with no signs of infection. Patient reports doing well with no unusual complaints. Appears to be progressing appropriately.  She states that she was initially doing quite well but now she has continued to have pain and discomfort on the medial aspect of the knee.  All the pain is over the medial portal at the site of the arthroscopy.  The ultrasound treatments that she is receiving in the physical therapy department have helped her tremendously.  She states that she has lost 7 pounds and there is no doubt in my mind that is going to be helpful as well.      Ortho Exam:   Left knee. Patient is 9 week(s) post knee arthoscopy. Arthroscopic portals are clean and healing well. Mild effusion is noted. There is no evidence of a deep seated joint infection. Range of motion is 0-125 degrees of flexion. Neurovascular status is intact. There is no anterior or posterior instability. The ACL function appears to be well preserved. Gait is cautious and slightly antalgic but otherwise fairly normal. The sharp, stabbing pain that the patient had prior to the surgery has completely settled down. The calf is soft and non-tender and there is no clinical evidence of a DVT.      Diagnostic Studies:  no diagnostic testing performed this visit      Assessment:  Diagnoses and all orders for this visit:    1. S/P arthroscopic knee surgery (Primary)    2. Peripheral tear of medial meniscus of left knee as current injury, subsequent encounter          Plan     • Continue ice as needed  • Aggressive ROM  • Stretching and strengthening exercises of the quads and the hamstrings.  • Topical application of Pennsaid as an  anti-inflammatory salve to help manage the symptoms of pain and inflammation medially.  • Alternate Ibuprofen and Tylenol as needed  • Fall precautions  • Follow up in 6 week(s)     Date of encounter: 04/01/2021  Roly Godoy MD

## 2021-04-01 NOTE — PROGRESS NOTES
Chief Complaint   Patient presents with   • Left Knee - Follow-up, Pain   • Post-op Knee         HPI 01/29/21        Vitals:    04/01/21 1052   Temp: 96.2 °F (35.7 °C)           Joint/Body Part Specific Exam:        X-RAY REPORT:        There are no diagnoses linked to this encounter.        Procedures        Instructions:      Plan:

## 2021-05-13 ENCOUNTER — OFFICE VISIT (OUTPATIENT)
Dept: ORTHOPEDIC SURGERY | Facility: CLINIC | Age: 46
End: 2021-05-13

## 2021-05-13 VITALS — BODY MASS INDEX: 44.2 KG/M2 | TEMPERATURE: 96.8 F | WEIGHT: 275 LBS | HEIGHT: 66 IN

## 2021-05-13 DIAGNOSIS — S83.222D PERIPHERAL TEAR OF MEDIAL MENISCUS OF LEFT KNEE AS CURRENT INJURY, SUBSEQUENT ENCOUNTER: ICD-10-CM

## 2021-05-13 DIAGNOSIS — Z98.890 S/P ARTHROSCOPIC KNEE SURGERY: Primary | ICD-10-CM

## 2021-05-13 PROCEDURE — 99213 OFFICE O/P EST LOW 20 MIN: CPT | Performed by: ORTHOPAEDIC SURGERY

## 2021-05-13 RX ORDER — MELOXICAM 15 MG/1
15 TABLET ORAL DAILY
Qty: 90 TABLET | Refills: 0 | Status: SHIPPED | OUTPATIENT
Start: 2021-05-13 | End: 2021-08-12

## 2021-05-16 VITALS
BODY MASS INDEX: 45.48 KG/M2 | SYSTOLIC BLOOD PRESSURE: 126 MMHG | HEIGHT: 65 IN | WEIGHT: 273 LBS | DIASTOLIC BLOOD PRESSURE: 81 MMHG | HEART RATE: 79 BPM

## 2021-05-16 VITALS
OXYGEN SATURATION: 96 % | TEMPERATURE: 98 F | BODY MASS INDEX: 44.03 KG/M2 | DIASTOLIC BLOOD PRESSURE: 80 MMHG | RESPIRATION RATE: 20 BRPM | HEART RATE: 109 BPM | SYSTOLIC BLOOD PRESSURE: 125 MMHG | WEIGHT: 264.25 LBS | HEIGHT: 65 IN

## 2021-05-18 ENCOUNTER — OFFICE VISIT CONVERTED (OUTPATIENT)
Dept: FAMILY MEDICINE CLINIC | Age: 46
End: 2021-05-18
Attending: NURSE PRACTITIONER

## 2021-05-18 NOTE — PROGRESS NOTES
Rosibel Baker  1975     Office/Outpatient Visit    Visit Date: Tue, Dec 10, 2019 05:45 pm    Provider: Milagros Camilo N.P. (Assistant: Spurling, Sarah C, MA)    Location: [facility]        Electronically signed by Milagros Camilo N.P. on  12/12/2019 01:04:40 PM                             Subjective:        CC: Mrs. Baker is a 44 year old White female.  This is her first visit to the clinic.  establishing care w Milagros. Needing refills and watning doxycycline for her skin. Pt was at the [facility] and she said that she is still having ear pain and throat is red, she said amoxicillin didn't work.;         HPI:           She complains of a sore throat.  has been seen at [facility] 2 wks ago for sore throat and ear pain.  rapid strep was negative but throat culture not done.  completed amoxil for ear pain and sore throat.   did not help.  afebrile.  denies runny nose or cough.            Additionally, she presents with history of postprocedural hypothyroidism.  she is currently taking Levothyroid, 150 mcg daily.  TSH was last checked 6 months ago.  The result was reported as normal.  Currently, she is experiencing fatigue and weight gain.  She reports no symptoms suggestive of adverse medication effect.            Additionally, she presents with history of dysthymic disorder.  stable on cymbalta.  denies side effects.   requests refills.            intermittent skin lesions/ boils beneath her breasts or groin area since thyroidectomy in 2002.  doxycycline helps and needs a refill.  states she does best with courses of daily tx with breaks in between.  has not seen dermatology.      ROS:     CONSTITUTIONAL:  Positive for fatigue.   Negative for chills or fever.      E/N/T:  Positive for ear pain ( right ) and sore throat.   Negative for nasal congestion or frequent rhinorrhea.      CARDIOVASCULAR:  Negative for chest pain, palpitations, tachycardia, orthopnea, and edema.       RESPIRATORY:  Negative for cough, dyspnea, and hemoptysis.      GASTROINTESTINAL:  Negative for abdominal pain, heartburn, constipation, diarrhea, and stool changes.      MUSCULOSKELETAL:  Positive for back pain ( chronic ).      INTEGUMENTARY/BREAST:  Positive for intermittent skin boils.      NEUROLOGICAL:  Negative for dizziness, headaches, paresthesias, and weakness.      ENDOCRINE:  Negative for hair loss, heat/cold intolerance, polydipsia, and polyphagia.      PSYCHIATRIC:  Positive for anxiety and pauline depression ( (stable) ).   Negative for mood swings, sleep disturbance or suicidal thoughts.          Past Medical History / Family History / Social History:         Last Reviewed on 12/10/2019 06:21 PM by Milagros Camilo    Past Medical History:             PAST MEDICAL HISTORY     UNREMARKABLE         GYNECOLOGICAL HISTORY:    G1 (twins) P2    Current method of contraception is Essure;     Chronic Pain: since 2014; affecting the low back;         PREVENTIVE HEALTH MAINTENANCE             MAMMOGRAM: Done within last 2 years and results in are chart was last done 10/2017 with normal results         Surgical History:         Cholecystectomy: laparoscopic; 4-2013;     Tonsillectomy/Adenoidectomy  Uterine Ablation     Procedures: LEEP procedure RADIOACTIVE THYROID ABLATION 2002 uterine polyp removal july/2017     Hysterectomy: 4-12-18;     L oopharectomy 5/2019;         Family History:     Father: Healthy     Mother: MVP;  anemia     Brother(s): 0 brother(s) total     Sister(s): 0 sister(s) total         Social History:     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          Alcohol:  Does not drink alcohol and never has.          Substance Abuse History:     Last Reviewed on 5/29/2019  10:29 AM by Andree Sandhu        Mental Health History:     Last Reviewed on 5/29/2019 10:29 AM by Andree Sandhu        Communicable Diseases (eg STDs):     Last Reviewed on 5/29/2019 10:29 AM by Andree Sandhu        Current Problems:     Last Reviewed on 12/10/2019 06:20 PM by Milagros Camilo    Postprocedural hypothyroidism    Nicotine dependence, unspecified, uncomplicated    Low back pain    Dysthymic disorder    Vitamin D deficiency, unspecified    Obesity, unspecified    Polycystic ovarian syndrome    Unspecified convulsions    Diastolic dysfunction-Grade 1    Dorsalgia, unspecified    Encounter for screening for depression    Acute pharyngitis, unspecified    Furuncle of groin        Immunizations:     Hep A, adult dose 1/7/2019    Fluzone (3 + years dose) 10/19/2017    Fluzone (3 + years dose) 1/7/2019        Allergies:     Last Reviewed on 5/29/2019 10:29 AM by Andree aSndhu    Latex:   (Adverse Reaction)    Augmentin: nausea  (Adverse Reaction)    Diclofenac:      Sulfas:          Current Medications:     Last Reviewed on 12/10/2019 05:53 PM by Spurling, Sarah C    levothyroxine 150 mcg oral tablet [take 1 tablet (150 mcg) by oral route once daily]    Cymbalta 60 mg oral capsule,delayed release (enteric coated) [take 1 capsule (60 mg) by oral route once daily]    traMADol 50 mg oral tablet [take 1 tablet (50 mg) by oral route every 4 hours as needed]        Objective:        Vitals:         Current: 12/10/2019 5:54:31 PM    Ht:  5 ft, 6 in;  Wt: 276.2 lbs;  BMI: 44.6T: 98.3 F (oral);  BP: 109/75 mm Hg (left arm, sitting);  P: 72 bpm (left arm (BP Cuff), sitting);  sCr: 0.99 mg/dL;  GFR: 94.04        Exams:     PHYSICAL EXAM:     GENERAL:  well developed and nourished; appropriately groomed; in no apparent distress;     E/N/T: EARS: both TMs are have fluid behind them;  NOSE: normal nasal mucosa; OROPHARYNX: posterior pharynx, including tonsils, tongue, and uvula are normal;      RESPIRATORY: normal respiratory rate and pattern with no distress; normal breath sounds with no rales, rhonchi, wheezes or rubs;     CARDIOVASCULAR: normal rate; rhythm is regular;     LYMPHATIC: no enlargement of cervical or facial nodes;     BREAST/INTEGUMENT: (declines exam of groin)     MUSCULOSKELETAL:  Normal range of motion, strength and tone;     NEUROLOGIC: mental status: alert and oriented x 3; GROSSLY INTACT     PSYCHIATRIC:  appropriate affect and demeanor; normal speech pattern; grossly normal memory;         Lab/Test Results:         Rapid Strep Screen: Negative (12/10/2019),     Performed by:: racquel (12/10/2019),             Assessment:         J02.9   Acute pharyngitis, unspecified       E89.0   Postprocedural hypothyroidism       F34.1   Dysthymic disorder       L02.224   Furuncle of groin           ORDERS:         Meds Prescribed:       [Refilled] Cymbalta 60 mg oral capsule,delayed release (enteric coated) [take 1 capsule (60 mg) by oral route once daily], #90 (ninety) capsules, Refills: 1 (one)       [Refilled] levothyroxine 150 mcg oral tablet [take 1 tablet (150 mcg) by oral route once daily], #90 (ninety) tablets, Refills: 1 (one)       [New Rx] doxycycline monohydrate 100 mg oral capsule [take 1 capsule (100 mg) by oral route 2 times per day], #30 (thirty) capsules, Refills: 1 (one)         Lab Orders:       12039  Group A Streptococcus detection by immunoassay with direct optical observation  (In-House)            05756  [facility] Thyroid panel with TSH ([id number], [id number])  (Send-Out)            04042  [facility] Throat culture, strep  (Send-Out)                      Plan:         Acute pharyngitis, unspecified        RECOMMENDATIONS given include: rest, increase oral fluid intake, Salt water gargle, and watch for signs of acid reflux as contributor.  flonase or nasacort nasal spray.  follow up if not improving..      repeating strep test per request.  pt had to leave prior to result being  available.  requests we call her with result.            Orders:       [id number]  Group A Streptococcus detection by immunoassay with direct optical observation  (In-House)            35036  [facility] Throat culture, strep  (Send-Out)              Postprocedural hypothyroidism    LABORATORY:  Labs ordered to be performed today include Thyroid Panel.            Prescriptions:       [Refilled] levothyroxine 150 mcg oral tablet [take 1 tablet (150 mcg) by oral route once daily], #90 (ninety) tablets, Refills: 1 (one)           Orders:       [id number]  [facility] Thyroid panel with TSH ([id number], [id number])  (Send-Out)              Dysthymic disorder          Prescriptions:       [Refilled] Cymbalta 60 mg oral capsule,delayed release (enteric coated) [take 1 capsule (60 mg) by oral route once daily], #90 (ninety) capsules, Refills: 1 (one)         Furuncle of groin        RECOMMENDATIONS given include: I do not recommend regular use of doxycycline-  could cause stomach upset and not work for her if needed for other infections in the future.  consider dermatology referral..            Prescriptions:       [New Rx] doxycycline monohydrate 100 mg oral capsule [take 1 capsule (100 mg) by oral route 2 times per day], #30 (thirty) capsules, Refills: 1 (one)             Patient Recommendations:        For  Acute pharyngitis, unspecified:    Get plenty of rest. Increase oral fluid intake. Make salt water solution by combining 1/2 to 1 teaspoons of table salt with 8 ounces of warm water.  Gargle for 10 seconds and spit out salt water.  Repeat several times a day as needed.              Charge Capture:         Primary Diagnosis:     J02.9  Acute pharyngitis, unspecified           Orders:      26649  Office/outpatient visit; established patient, level 4  (In-House)            00540  Group A Streptococcus detection by immunoassay with direct optical observation  (In-House)              E89.0  Postprocedural hypothyroidism     F34.1  Dysthymic  disorder     L02.224  Furuncle of groin

## 2021-05-18 NOTE — PROGRESS NOTES
Rosibel Baker 1975     Office/Outpatient Visit    Visit Date: Fri, Mar 8, 2019 02:45 pm    Provider: Andree Sandhu N.P. (Assistant: Stefaine Salinas MA)    Location: [facility]        Electronically signed by Andree Sandhu N.P. on  03/10/2019 10:36:16 PM                             SUBJECTIVE:        CC:     Mrs. Baker is a 44 year old White female.  Patient presents today for physical exam, also wants to discuss back pain;         HPI:         Health checkup noted.  She cannot recall when she last had a physical exam.  She is status-post hysterectomy.  She is not currently using any form of contraception.  She performs breast self-exams monthly.    Her last Pap smear was 1 year ago.   Her last mammogram was 1 year ago.   Her last ECG was was normal.   A chest x-ray was done it was normal.   Preventative Health updated today.  She is current with her influenza immunization.          PHQ-9 Depression Screening: Completed form scanned and in chart; Total Score 4 Alcohol Consumption Screening: Completed form scanned and in chart; Total Score 1         Mrs. Baker desires help with smoking cessation.  States still smoking, not ready to quit.          Acquired hypothyroidism, postablative details; states doing well. She has eye concerns, following with an optometrist who measures and watches her eye pressure. He recommended surgery but she is not ready for that.          Vit D deficiency: Pt states being on Vit D. Checking lab today.          Additionally, she presents with history of back pain.  other details: Pt goes to [facility]. States having an injection last week and feeling great..          Dx with human papillomavirus; states being diagnosed with HPV when she was younger. She she occassionally has a few lesions that pop up. The will sometimes go away but recently had one that won't. Requesting med.      ROS:     CONSTITUTIONAL:  Negative for chills, fatigue, fever, and weight change.       EYES:  Negative for blurred vision.      CARDIOVASCULAR:  Negative for chest pain, orthopnea, paroxysmal nocturnal dyspnea and pedal edema.      RESPIRATORY:  Negative for dyspnea.      GASTROINTESTINAL:  Negative for abdominal pain, constipation, diarrhea, nausea and vomiting.      MUSCULOSKELETAL:  Positive for back pain.      NEUROLOGICAL:  Negative for dizziness, headaches, paresthesias, and weakness.      PSYCHIATRIC:  Negative for anxiety, depression, and sleep disturbances.          PMH/FMH/SH:     Last Reviewed on 3/08/2019 02:56 PM by Andree Sandhu    Past Medical History:             PAST MEDICAL HISTORY     UNREMARKABLE         GYNECOLOGICAL HISTORY:    G1 (twins) P2    Current method of contraception is Essure;     Chronic Pain: since 2014; affecting the low back;         PREVENTIVE HEALTH MAINTENANCE             MAMMOGRAM: Done within last 2 years and results in are chart was last done 10/2017 with normal results         Surgical History:         Cholecystectomy: laparoscopic; 4-2013;      Tonsillectomy/Adenoidectomy   Uterine Ablation     Procedures: LEEP procedure RADIOACTIVE THYROID ABLATION 2002 uterine polyp removal july/2017     Hysterectomy: 4-12-18;         Family History:     Father: Healthy     Mother: MVP;  anemia     Brother(s): 0 brother(s) total     Sister(s): 0 sister(s) total         Social History:     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          Alcohol:  Does not drink alcohol and never has.          Substance Abuse History:     Last Reviewed on 3/08/2019 02:56 PM by Andree Sandhu        Mental Health History:     Last Reviewed on 3/08/2019 02:56 PM by Andree Sandhu        Communicable Diseases (eg STDs):     Last Reviewed on 3/08/2019  02:56 PM by Andree Sandhu            Current Problems:     Last Reviewed on 3/08/2019 02:56 PM by Andree Sandhu    Obesity, NOS     Diastolic dysfunction-Grade 1     Seizures     Bilateral polycystic ovarian syndrome     Vitamin D deficiency, unspecified     Depression     Low back pain     Cigarette smoking     Acquired hypothyroidism, postablative     Screening for depression         Immunizations:     Hep A, adult dose 1/7/2019     Fluzone (3 + years dose) 10/19/2017     Fluzone (3 + years dose) 1/7/2019         Allergies:     Last Reviewed on 3/08/2019 02:56 PM by Andree Sandhu    Augmentin: nausea (Adverse Reaction)    Diclofenac:    Sulfas:        Current Medications:     Last Reviewed on 3/08/2019 02:56 PM by Andree Sandhu    Cymbalta 60mg Capsules, Delayed Release Take 1 capsule(s) by mouth daily     Tylenol     Levothyroxine Sodium 150mcg Capsules 1 capsule daily         OBJECTIVE:        Vitals:         Current: 3/8/2019 2:51:16 PM    Ht:  5 ft, 6 in;  Wt: 265.2 lbs;  BMI: 42.8    T: 98.1 F (oral);  BP: 135/84 mm Hg (left arm, sitting);  P: 95 bpm (left arm (BP Cuff), sitting);  sCr: 0.7 mg/dL;  GFR: 130.72        Exams:     PHYSICAL EXAM:     GENERAL: vital signs recorded - well developed, well nourished;  no apparent distress;     EYES: extraocular movements intact; conjunctiva and cornea are normal; PERRLA;     E/N/T:  normal EACs, TMs, nasal/oral mucosa, teeth, gingiva, and oropharynx;     NECK: range of motion is normal; thyroid is non-palpable;     RESPIRATORY: normal respiratory rate and pattern with no distress; normal breath sounds with no rales, rhonchi, wheezes or rubs;     CARDIOVASCULAR: normal rate; rhythm is regular;  no systolic murmur; no edema;     GASTROINTESTINAL: nontender; normal bowel sounds; no organomegaly;     MUSCULOSKELETAL: low back L side tenderness     NEUROLOGICAL:  cranial nerves, motor and sensory function, reflexes, gait and coordination are all intact;      PSYCHIATRIC:  appropriate affect and demeanor; normal speech pattern; grossly normal memory;         ASSESSMENT:           V70.0   Z00.00  Health checkup              DDx:     V79.0   Z13.89  Screening for depression              DDx:     305.1   F17.200  Cigarette smoking              DDx:     244.1   E89.0  Acquired hypothyroidism, postablative              DDx:     268.9   E55.9  Vitamin D deficiency, unspecified              DDx:     724.5   M54.9  Back pain              DDx:     079.4   B97.7  Human papillomavirus              DDx:         ORDERS:         Meds Prescribed:       Refill of: Levothyroxine Sodium 150mcg Capsules 1 capsule daily  #30 (Thirty) capsule(s) Refills: 0       Condylox (Podofilox) 0.5% Topical Gel Apply to affected area q12h for 3 days then stop for 4 days.  Repeat weekly as directed.  #1 (One) tube Refills: 1         Lab Orders:       64387  MG - [facility] Magnesium, Serum  (Send-Out)         94158  PHYSF - [facility] PHYSICAL: CMP, CBC, TSH, LIPID: [id number], [id number], 10008, 73347  (Send-Out)         24412  VITD - [facility] Vitamin D, 25 Hydroxy  (Send-Out)                   PLAN:          Health checkup     LABORATORY:  Labs ordered to be performed today include Magnesium level and PHYSICAL PANEL; CMP, CBC, TSH, LIPID.            Orders:       74708  MG - [facility] Magnesium, Serum  (Send-Out)         08232  PHYSF - [facility] PHYSICAL: CMP, CBC, TSH, LIPID: [id number], [id number], 83224, 60668  (Send-Out)            Acquired hypothyroidism, postablative           Prescriptions:       Refill of: Levothyroxine Sodium 150mcg Capsules 1 capsule daily  #30 (Thirty) capsule(s) Refills: 0          Vitamin D deficiency, unspecified           Orders:       [id number]  VITD - [facility] Vitamin D, 25 Hydroxy  (Send-Out)            Human papillomavirus           Prescriptions:       Condylox (Podofilox) 0.5% Topical Gel Apply to affected area q12h for 3 days then stop for 4 days.  Repeat weekly as directed.  #1 (One) tube Refills: 1             CHARGE  CAPTURE:           Primary Diagnosis:     V70.0 Health checkup            Z00.00    Encounter for general adult medical examination without abnormal findings              Orders:          [id number]   Preventive medicine, established patient, age 40-64 years  (In-House)           V79.0 Screening for depression            Z13.89    Encounter for screening for other disorder              Orders:          67412 -25  Office/outpatient visit; established patient, level 3  (In-House)           305.1 Cigarette smoking            F17.200    Nicotine dependence, unspecified, uncomplicated    244.1 Acquired hypothyroidism, postablative            E89.0    Postprocedural hypothyroidism    268.9 Vitamin D deficiency, unspecified            E55.9    Vitamin D deficiency, unspecified    724.5 Back pain            M54.9    Dorsalgia, unspecified    079.4 Human papillomavirus            B97.7    Papillomavirus as the cause of diseases classified elsewhere

## 2021-05-18 NOTE — PROGRESS NOTES
"Rosibel Baker 1975     Office/Outpatient Visit    Visit Date: Thu, Jul 12, 2018 01:22 pm    Provider: Milagros Camilo N.P. (Assistant: Enedina Hinds MA)    Location: [facility]        Electronically signed by Milagros Camilo N.P. on  07/12/2018 10:00:03 PM                             SUBJECTIVE:        CC:     Mrs. Baker is a 43 year old White female.  Right ear ache;         HPI:         Patient to be evaluated for otalgia, otogenic origin.      bilateral ear pain persists since last visit.  completed 10 day course of cefdinir.  right mastoiditis noted on ct of head .  afebrile.  has sinus headache with sinus congestion.  minimal nasal dc.  itchy , watery eyes.  took claritin x 1 yesterday.      ROS:     CONSTITUTIONAL:  Negative for chills, fatigue, fever, and weight change.      EYES:  Positive for eye drainage ( clear bilateral eye drainage ) and itching.   Negative for blurred vision, eye pain or photophobia.      E/N/T:  Positive for ear pain ( bilateral ), nasal congestion and sinus pressure.   Negative for frequent rhinorrhea or sore throat.      CARDIOVASCULAR:  Negative for chest pain, palpitations, tachycardia, orthopnea, and edema.      RESPIRATORY:  Negative for cough, dyspnea, and hemoptysis.      MUSCULOSKELETAL:  Negative for arthralgias, back pain, and myalgias.      NEUROLOGICAL:  Positive for dizziness ( with positional changes ), headaches ( \"sinus\" ) and seizures (recent x 1 .  to see neurology.  no further seizures.  thought to be drug reaction.).   Negative for vertigo.          PMH/FM/SH:     Last Reviewed on 6/28/2018 02:18 PM by Andree Sandhu    Past Medical History:             PAST MEDICAL HISTORY     UNREMARKABLE         GYNECOLOGICAL HISTORY:    G1 (twins) P2    Current method of contraception is Essure;     Chronic Pain: since 2014; affecting the low back;         PREVENTIVE HEALTH MAINTENANCE             MAMMOGRAM: Done within last 2 years and results in are " chart was last done 10/2017 with normal results         Surgical History:         Cholecystectomy: laparoscopic; 4-2013;      Tonsillectomy/Adenoidectomy   Uterine Ablation     Procedures: LEEP procedure RADIOACTIVE THYROID ABLATION 2002 uterine polyp removal july/2017     Hysterectomy: 4-12-18;         Family History:     Father: Healthy     Mother: MVP;  anemia     Brother(s): 0 brother(s) total     Sister(s): 0 sister(s) total         Social History:     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          Alcohol:  Does not drink alcohol and never has.          Substance Abuse History:     Last Reviewed on 6/28/2018 02:18 PM by Andree Sandhu        Mental Health History:     Last Reviewed on 6/28/2018 02:18 PM by Andree Sandhu        Communicable Diseases (eg STDs):     Last Reviewed on 6/28/2018 02:18 PM by Andree Sandhu            Current Problems:     Last Reviewed on 6/28/2018 02:18 PM by Andree Sandhu    Seizures     Bilateral polycystic ovarian syndrome     Obesity, NOS     Fatigue     Vitamin D deficiency, unspecified     Screening for hyperlipidemia     Screening for cancer of colon     Depression     Low back pain     Cigarette smoking     Acquired hypothyroidism, postablative     Acute otitis media         Immunizations:     Fluzone (3 + years dose) 10/19/2017         Allergies:     Last Reviewed on 6/28/2018 02:18 PM by Andree Sandhu    Augmentin: nausea (Adverse Reaction)    Sulfas:    Latex: (Adverse Reaction)        Current Medications:     Last Reviewed on 7/12/2018 01:26 PM by Enedina Hinds    Cymbalta 30mg Capsules, Delayed Release 1 capsule daily     Levothyroxine Sodium 150mcg Capsules 1 capsule daily     Xanax 1mg Tablet 1/2 to 1 as needed for  "anxiety     Doxycycline Monohydrate 100mg Capsules Take 1 capsule(s) by mouth bid     Tylenol         OBJECTIVE:        Vitals:         Historical:     06/28/2018  BP:   118/70 mm Hg ( (left arm, , sitting, );)     06/28/2018  Wt:   283.9lbs        Current: 7/12/2018 1:25:31 PM    Ht:  5 ft, 6 in;  Wt: 281 lbs;  BMI: 45.4    T: 97.3 F (oral);  BP: 109/76 mm Hg (left arm, sitting);  P: 90 bpm (finger clip, sitting);  sCr: 0.7 mg/dL;  GFR: 135.35        Exams:     PHYSICAL EXAM:     GENERAL: no apparent distress;     EYES: \"cobblestoned\" bilateral palpebral conjunctiva;     E/N/T: EARS: external auditory canal erythematous on the right;  the left TM is has fluid behind it and yellow and the right TM is has fluid behind it;  NOSE: nasal mucosa is erythematous;  OROPHARYNX: posterior pharynx, including tonsils, tongue, and uvula are normal;     RESPIRATORY: normal respiratory rate and pattern with no distress; normal breath sounds with no rales, rhonchi, wheezes or rubs;     CARDIOVASCULAR: normal rate; rhythm is regular;     LYMPHATIC: no enlargement of cervical or facial nodes;     MUSCULOSKELETAL:  Normal range of motion, strength and tone;     NEUROLOGIC: mental status: alert and oriented x 3; GROSSLY INTACT     PSYCHIATRIC:  appropriate affect and demeanor; normal speech pattern; grossly normal memory;         Procedures:     L otitis media     1. Bicillin LA 1.2 million units given IM in the left hip; administered by mnp;  lot number [id number]; expires 06/20             ASSESSMENT           382.00   H66.002  L otitis media              DDx:     372.05   H10.13  Allergic conjunctivitis, acute              DDx:     380.11   H60.311  Acute otitis externa              DDx:         ORDERS:         Meds Prescribed:       Zaditor (Ketotifen) 0.025% Ophthalmic Solution 2 gtts to affected eye BID  #5 (Five) ml Refills: 0       Ofloxacin 0.3% Otic Solution 2-3 drops BID x 7 days to affected ear  #1 (One) bottle Refills: 0     "     Other Orders:       [id number]  Therapeutic injection  (In-House)           Bicillin L-A 100,000 units (x12)                 PLAN:          L otitis media         requests injectable antibioitc.  follow up if not improving. to ER if worsens.            Orders:       [id number]  Therapeutic injection  (In-House)                     Bicillin L-A 100,000 units (x12)          Allergic conjunctivitis, acute         RECOMMENDATIONS given include: avoid rubbing eyes and See an eye doctor if not improving after two days.            Prescriptions:       Zaditor (Ketotifen) 0.025% Ophthalmic Solution 2 gtts to affected eye BID  #5 (Five) ml Refills: 0          Acute otitis externa           Prescriptions:       Ofloxacin 0.3% Otic Solution 2-3 drops BID x 7 days to affected ear  #1 (One) bottle Refills: 0             Patient Recommendations:        For  Allergic conjunctivitis, acute:     Increase oral fluid intake.              CHARGE CAPTURE           **Please note: ICD descriptions below are intended for billing purposes only and may not represent clinical diagnoses**        Primary Diagnosis:         382.00 L otitis media            H66.002    Acute suppurative otitis media without spontaneous rupture of ear drum, left ear              Orders:          98866   Office/outpatient visit; established patient, level 3  (In-House)             39634   Therapeutic injection  (In-House)                                           Bicillin L-A 100,000 units (x12)         372.05 Allergic conjunctivitis, acute            H10.13    Acute atopic conjunctivitis, bilateral    380.11 Acute otitis externa            H60.311    Diffuse otitis externa, right ear        ADDENDUMS:      ____________________________________    Date: 08/24/2018 01:09 PM    Author: Marlys Dinh         Visit Note Faxed to:        Chirag Meeks  (Cardiology); Number [phone]

## 2021-05-18 NOTE — PROGRESS NOTES
Rosibel Baker 1975     Office/Outpatient Visit    Visit Date: Wed, May 29, 2019 09:58 am    Provider: Andree Sandhu N.P. (Assistant: Stefanie Salinas MA)    Location: [facility]        Electronically signed by Andree Sandhu N.P. on  06/02/2019 01:00:02 AM                             SUBJECTIVE:        CC:     Mrs. Baker is a 44 year old White female.  Patient presents today for medication refills and lab work, also wants to discuss recurrent ear infections;         HPI:         Ear pain noted.      Pt states continued ear infections. She has seen an ENT who told her everything is ok. She expressed to him that she continued to have drainage and pt felt like he did not see this as a problem.          In regard to the depression, states doing well on med. Here for f/u and refills.          In regard to the acquired hypothyroidism, postablative, pt states doing well on med. Here for f/u, refills, and labs.      ROS:     CONSTITUTIONAL:  Negative for chills, fatigue, fever, and weight change.      EYES:  Negative for blurred vision.      CARDIOVASCULAR:  Negative for chest pain, orthopnea, paroxysmal nocturnal dyspnea and pedal edema.      RESPIRATORY:  Negative for dyspnea.      GASTROINTESTINAL:  Negative for abdominal pain, constipation, diarrhea, nausea and vomiting.      NEUROLOGICAL:  Negative for dizziness, headaches, paresthesias, and weakness.      PSYCHIATRIC:  Negative for anxiety, depression, and sleep disturbances.          PMH/FMH/SH:     Last Reviewed on 5/29/2019 10:29 AM by Andree Sandhu    Past Medical History:             PAST MEDICAL HISTORY     UNREMARKABLE         GYNECOLOGICAL HISTORY:    G1 (twins) P2    Current method of contraception is Essure;     Chronic Pain: since 2014; affecting the low back;         PREVENTIVE HEALTH MAINTENANCE             MAMMOGRAM: Done within last 2 years and results in are chart was last done 10/2017 with normal results         Surgical  History:         Cholecystectomy: laparoscopic; 4-2013;      Tonsillectomy/Adenoidectomy   Uterine Ablation     Procedures: LEEP procedure RADIOACTIVE THYROID ABLATION 2002 uterine polyp removal july/2017     Hysterectomy: 4-12-18;      L oopharectomy 5/2019;         Family History:     Father: Healthy     Mother: MVP;  anemia     Brother(s): 0 brother(s) total     Sister(s): 0 sister(s) total         Social History:     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          Alcohol:  Does not drink alcohol and never has.          Substance Abuse History:     Last Reviewed on 5/29/2019 10:29 AM by Andree Sandhu        Mental Health History:     Last Reviewed on 5/29/2019 10:29 AM by Andree Sandhu        Communicable Diseases (eg STDs):     Last Reviewed on 5/29/2019 10:29 AM by Andree Sandhu            Current Problems:     Last Reviewed on 5/29/2019 10:29 AM by Andree Sandhu    Back pain     Obesity, NOS     Diastolic dysfunction-Grade 1     Seizures     Bilateral polycystic ovarian syndrome     Vitamin D deficiency, unspecified     Depression     Low back pain     Cigarette smoking     Acquired hypothyroidism, postablative         Immunizations:     Hep A, adult dose 1/7/2019     Fluzone (3 + years dose) 10/19/2017     Fluzone (3 + years dose) 1/7/2019         Allergies:     Last Reviewed on 5/29/2019 10:29 AM by Andree Sandhu    Augmentin: nausea (Adverse Reaction)    Diclofenac:    Sulfas:        Current Medications:     Last Reviewed on 5/29/2019 10:29 AM by Andree Sandhu    Zofran 4mg Tablet 1 po q 6 hours prn     Cymbalta 60mg Capsules, Delayed Release Take 1 capsule(s) by mouth daily     Tylenol         OBJECTIVE:        Vitals:         Current: 5/29/2019  10:02:30 AM    Ht:  5 ft, 6 in;  Wt: 269 lbs;  BMI: 43.4    T: 97.5 F (oral);  BP: 117/80 mm Hg (left arm, sitting);  P: 93 bpm (left arm (BP Cuff), sitting);  sCr: 0.99 mg/dL;  GFR: 92.99        Exams:     PHYSICAL EXAM:     GENERAL: vital signs recorded - well developed, well nourished;  no apparent distress;     EYES: extraocular movements intact; conjunctiva and cornea are normal; PERRLA;     NECK: range of motion is normal; thyroid is non-palpable;     RESPIRATORY: normal respiratory rate and pattern with no distress; normal breath sounds with no rales, rhonchi, wheezes or rubs;     CARDIOVASCULAR: normal rate; rhythm is regular;  no systolic murmur; no edema;     GASTROINTESTINAL: nontender; normal bowel sounds; no organomegaly;     NEUROLOGICAL:  cranial nerves, motor and sensory function, reflexes, gait and coordination are all intact;     PSYCHIATRIC:  appropriate affect and demeanor; normal speech pattern; grossly normal memory;         ASSESSMENT:           388.70   H92.02   H92.01  Ear pain              DDx:     296.20   F34.1  Depression              DDx:     244.1   E89.0  Acquired hypothyroidism, postablative              DDx:         ORDERS:         Meds Prescribed:       Refill of: Cymbalta (Duloxetine HCl) 60mg Capsules, Delayed Release Take 1 capsule(s) by mouth daily  #90 (Ninety) capsule(s) Refills: 1       Refill of: Synthroid (Levothyroxine Sodium) 0.15mg Tablet 1 tab daily  #90 (Ninety) tablet(s) Refills: 1         Lab Orders:       FUTURE  Future order to be done at patients convenience  (Send-Out)         11063  THY - [facility] Thyroid panel with TSH (11283, 37900)  (Send-Out)         20427  [facility] Ear Culture  (Send-Out)                   PLAN:          Ear pain     LABORATORY:  Labs ordered to be performed today include Ear culture.            Orders:       [id number]  [facility] Ear Culture  (Send-Out)            Depression           Prescriptions:       Refill of: Cymbalta (Duloxetine HCl)  60mg Capsules, Delayed Release Take 1 capsule(s) by mouth daily  #90 (Ninety) capsule(s) Refills: 1          Acquired hypothyroidism, postablative         FOLLOW-UP TESTING #1: FOLLOW-UP LABORATORY:  Labs to be scheduled in the future include Thyroid Panel.            Prescriptions:       Refill of: Synthroid (Levothyroxine Sodium) 0.15mg Tablet 1 tab daily  #90 (Ninety) tablet(s) Refills: 1           Orders:       FUTURE  Future order to be done at patients convenience  (Send-Out)         32512  [facility] Thyroid panel with TSH (77077, [id number])  (Send-Out)               Patient Recommendations:        For  Acquired hypothyroidism, postablative:             The following laboratory testing has been ordered:             CHARGE CAPTURE:           Primary Diagnosis:     388.70 Ear pain            H92.02    Otalgia, left ear           H92.01    Otalgia, right ear              Orders:          72803   Office/outpatient visit; established patient, level 4  (In-House)           296.20 Depression            F34.1    Dysthymic disorder    244.1 Acquired hypothyroidism, postablative            E89.0    Postprocedural hypothyroidism

## 2021-05-18 NOTE — PROGRESS NOTES
Rosibel Baker  1975     Office/Outpatient Visit    Visit Date: Mon, Jun 8, 2020 01:13 pm    Provider: Patricia Osborne N.P. (Assistant: Denise Diego, )    Location: [facility]        Electronically signed by Patricia Osborne N.P. on  06/08/2020 10:32:05 PM                             Subjective:        CC: Mrs. Baker is a 45 year old White female.  Physical, check thyriod levels; TP patient;         HPI:           PHQ-9 Depression Screening: Completed form scanned and in chart; Total Score 1           In regard to the postprocedural hypothyroidism, this was first diagnosed several years ago.  had thyroid ablasion in 2002  been  no longer seeing endo / ent  stable over the past few years           Mrs. Baker desires help with smoking cessation.  She reports smoking 1 pack a day.  She has smoked for 27 years.   She has tried to quit numerous times.   Methods tried have included COLD TURKEY.   She remained abstinent for 7 to 9 months.   She attributes relapse to high stress level and likes to smoke.  the only time she quit was during pregnancy.  She enjoys smoking and does not wish to quit at this time          With regard to the low back pain, reason for visit: Pain.  Other details: pain management / injections in SI joint /  [facility] pain management   - chiropractor;  She is currently taking tramadol PRN and methocarb.  which she takes rarely - reports that the injections work the best.            Dx with dysthymic disorder; this is a routine follow-up.  The diagnosis of depression was made several years ago.  currently taking duloxetine daily for depression - works great - no reported side effects and no desire to make any changes       takes OTC vitamin D          Complaint of polycystic ovarian syndrome..  had total [facility] in 2019 [facility]  Estraidiol patch currently       convulsion - allergy to voltaren -  not taken since then  no problems     ROS:     CONSTITUTIONAL:   Negative for chills, fatigue and fever.      EYES:  Positive for use of glasses and contact lenses.      E/N/T:  Positive for ear pain ( right ).   Negative for diminished hearing or nasal congestion.      CARDIOVASCULAR:  Positive for pedal edema.   Negative for chest pain.      RESPIRATORY:  Negative for recent cough, dyspnea and frequent wheezing.      GASTROINTESTINAL:  Negative for abdominal pain, constipation, diarrhea, heartburn, nausea and vomiting.      GENITOURINARY:  Negative for dysuria, vaginal discharge, vaginal itching and change in urine stream.      MUSCULOSKELETAL:  Positive for back pain and joint stiffness.   Negative for arthralgias or myalgias.      INTEGUMENTARY/BREAST:  Positive for hydradenitis supertiva - -Dr. Barry.   Negative for pruritis or rash.      NEUROLOGICAL:  Positive for headaches.   Negative for dizziness or paresthesias.      ENDOCRINE:  Negative for hair loss, polydipsia and polyphagia.      ALLERGIC/IMMUNOLOGIC:  Positive for seasonal allergies.      PSYCHIATRIC:  Negative for anxiety, crying spells, depression, feelings of stress, difficulty concentrating, sleep disturbance and suicidal thoughts.          Past Medical History / Family History / Social History:         Last Reviewed on 6/08/2020 01:21 PM by Patricia Osborne    Past Medical History:             PAST MEDICAL HISTORY     UNREMARKABLE         GYNECOLOGICAL HISTORY:    G1 (twins) P2    Current method of contraception is Essure;     Chronic Pain: since 2014; affecting the low back;         PREVENTIVE HEALTH MAINTENANCE             DENTAL CLEANING: was last done 2020  [facility]     EYE EXAM: was last done 2020 - [facility]     MAMMOGRAM: Done within last 2 years and results in are chart was last done 10/2017 with normal results         PAST MEDICAL HISTORY             CURRENT MEDICAL PROVIDERS:    Dermatologist: Dr. Barry    Obstetrician/Gynecologist: Cherri Seals    Pain Management: [facility]         Surgical History:         Cholecystectomy: laparoscopic; 4-2013;     Tonsillectomy/Adenoidectomy  Uterine Ablation     Procedures: LEEP procedure RADIOACTIVE THYROID ABLATION 2002 uterine polyp removal july/2017     Hysterectomy: 4-12-18;     L oopharectomy 5/2019;         Family History:     Father: Healthy     Mother: MVP;  anemia     Brother(s): 0 brother(s) total     Sister(s): 0 sister(s) total     Paternal Grandmother: Breast Cancer ( at age 70 )         Social History:     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          Alcohol:  Does not drink alcohol and never has.          Substance Abuse History:     Last Reviewed on 6/08/2020 01:21 PM by Patricia Osborne    None         Mental Health History:     Last Reviewed on 6/08/2020 01:21 PM by Patricia Osborne        Generalized Anxiety Disorder     Dysthymic Disorder ( with seasonal pattern )         Communicable Diseases (eg STDs):     Last Reviewed on 6/08/2020 01:21 PM by Patricia Osborne    Reportable health conditions; NEGATIVE         Current Problems:     Last Reviewed on 6/08/2020 01:21 PM by Patricia Osborne    Postprocedural hypothyroidism    Nicotine dependence, unspecified, uncomplicated    Low back pain    Dysthymic disorder    Vitamin D deficiency, unspecified    Obesity, unspecified    surgically induced menopause (2019)    Encounter for general adult medical examination without abnormal findings    Encounter for other screening for malignant neoplasm of breast    Encounter for screening for depression    Hidradenitis suppurativa        Immunizations:     Hep A, adult dose 1/7/2019    Fluzone (3 + years dose) 10/19/2017    Fluzone (3 + years dose) 1/7/2019        Allergies:     Last Reviewed on  6/08/2020 01:21 PM by Patricia Osborne    Latex:   (Adverse Reaction)    Augmentin: Nausea  (Adverse Reaction)    Diclofenac:      Sulfas:          Current Medications:     Last Reviewed on 6/08/2020 01:21 PM by Patricia Osborne    traMADol 50 mg oral tablet [take 1 tablet (50 mg) by oral route every 4 hours as needed]    levothyroxine 150 mcg oral tablet [take 1 tablet (150 mcg) by oral route once daily]    DULoxetine 60 mg oral capsule,delayed release (enteric coated) [TAKE 1 CAPSULE BY MOUTH EVERY DAY]    Esttradiol 0.1mg [apply patch  twice-weekly]        Objective:        Vitals:         Current: 6/8/2020 1:19:00 PM    Ht:  5 ft, 6 in;  Wt: 280.4 lbs;  BMI: 45.3T: 98.2 F (oral);  BP: 105/83 mm Hg (right arm, sitting);  P: 87 bpm (right arm (BP Cuff), sitting);  sCr: 0.99 mg/dL;  GFR: 93.67        Exams:     PHYSICAL EXAM:     GENERAL: vital signs recorded - well developed, well nourished, obese;  no apparent distress;     EYES: extraocular movements intact; PERRL;     E/N/T: EARS: external auditory canal normal, edematous, and erythematous;  the right TM is distorted and yellow and bilateral TMs are normal;  NOSE:  normal nasal mucosa, septum, turbinates, and sinuses; OROPHARYNX:  normal mucosa, dentition, gingiva, and posterior pharynx;     NECK: range of motion is normal;     RESPIRATORY: normal appearance and symmetric expansion of chest wall; normal respiratory rate and pattern with no distress; normal breath sounds with no rales, rhonchi, wheezes or rubs;     CARDIOVASCULAR: normal rate; rhythm is regular;  no edema;     GASTROINTESTINAL: nontender; normal bowel sounds; no organomegaly;     LYMPHATIC: right preauricular node ( tender );  no supraclavicular nodes;     MUSCULOSKELETAL: normal gait; normal range of motion of all major muscle groups; no limb or joint pain with range of motion;     NEUROLOGIC: mental status: alert and oriented x 3;     PSYCHIATRIC: appropriate affect and demeanor; normal  speech pattern; normal thought and perception;         Assessment:         Z00.00   Encounter for general adult medical examination without abnormal findings       E89.0   Postprocedural hypothyroidism       F17.200   Nicotine dependence, unspecified, uncomplicated       M54.5   Low back pain       F34.1   Dysthymic disorder       E55.9   Vitamin D deficiency, unspecified       E28.2   Polycystic ovarian syndrome       R56.9   Unspecified convulsions       Z12.39   Encounter for other screening for malignant neoplasm of breast       Z13.31   Encounter for screening for depression           ORDERS:         Meds Prescribed:       [New Rx] doxycycline monohydrate 100 mg oral capsule [take 1 capsule (100 mg) by oral route 2 times per day x 5 days], #10 (ten) capsules, Refills: 0 (zero)       [Refilled] DULoxetine 60 mg oral capsule,delayed release (enteric coated) [TAKE 1 CAPSULE BY MOUTH EVERY DAY], #90 (ninety) capsules, Refills: 1 (one)         Lab Orders:       [id number]  [facility] PHYSICAL: CMP, CBC, TSH, LIPID: [id number], [id number], [id number]  (Send-Out)              Other Orders:         Depression screen negative  (In-House)              Smoking and Tobacco Cessation 3 to 10 minutes  (In-House)                      Plan:         Encounter for general adult medical examination without abnormal findings    LABORATORY:  Labs ordered to be performed today include PHYSICAL PANEL; CMP, CBC, TSH, LIPID.            Orders:       [id number]  [facility] PHYSICAL: CMP, CBC, TSH, LIPID: [id number], [id number], [id number]  (Send-Out)              Postprocedural hypothyroidismWill check labs, refills medication after labs if all normal         Nicotine dependence, unspecified, uncomplicatedShe is currently not interested in smoking cessation.  However, we discussed risks associated with smoking and increase risks with increase time /age        RECOMMENDATIONS given include: Counseled on smoking cessation and advised of the benefits to  patient's health if she were to stop smoking. Counseling for 3 to 10 minutes.            Orders:         Smoking and Tobacco Cessation 3 to 10 minutes  (In-House)              Low back paincontinue with pain management as recommended        Dysthymic disorderRefills good until September.  Will be ok with refills until 6 months  Appt will be due in December for depression          Prescriptions:       [Refilled] DULoxetine 60 mg oral capsule,delayed release (enteric coated) [TAKE 1 CAPSULE BY MOUTH EVERY DAY], #90 (ninety) capsules, Refills: 1 (one)         Vitamin D deficiency, unspecifiedcontinue with oral OTC supplement        Polycystic ovarian syndromeresolved after hysterectomy        Unspecified convulsionsno longer a problem - related to medication that is listed as allergy (diclofenac)          Encounter for other screening for malignant neoplasm of breastShe will like to wait until January for her mammogram so she will have these all done in her birth month. Tickle for mammogram in January 2021        Encounter for screening for depression    MIPS Negative Depression Screen           Orders:         Depression screen negative  (In-House)                  Other Prescriptions:       [New Rx] doxycycline monohydrate 100 mg oral capsule [take 1 capsule (100 mg) by oral route 2 times per day x 5 days], #10 (ten) capsules, Refills: 0 (zero)         Patient Recommendations:        For  Nicotine dependence, unspecified, uncomplicated:        Stop smoking.              Charge Capture:         Primary Diagnosis:     Z00.00  Encounter for general adult medical examination without abnormal findings           Orders:      [id number]  Office/outpatient visit; established patient, level 3  (In-House)            80268  Preventive medicine, established patient, age 40-64 years  (In-House)              E89.0  Postprocedural hypothyroidism     F17.200  Nicotine dependence, unspecified, uncomplicated            Orders:        Smoking and Tobacco Cessation 3 to 10 minutes  (In-House)              M54.5  Low back pain     F34.1  Dysthymic disorder     E55.9  Vitamin D deficiency, unspecified     E28.2  Polycystic ovarian syndrome     R56.9  Unspecified convulsions     Z12.39  Encounter for other screening for malignant neoplasm of breast     Z13.31  Encounter for screening for depression           Orders:        Depression screen negative  (In-House)

## 2021-05-18 NOTE — PROGRESS NOTES
"Rosibel Baker 1975     Office/Outpatient Visit    Visit Date: Thu, May 31, 2018 01:37 pm    Provider: Andree Sandhu N.P. (Assistant: Leanna Hernandez MA)    Location: [facility]        Electronically signed by Andree Sandhu N.P. on  06/04/2018 08:20:41 AM                             SUBJECTIVE:        CC: Pt also seen by XENA Ferrara NP student     Mrs. Baker is a 43 year old White female.  This is a follow-up visit.  Med refill;         HPI:         Reports feeling good on medication;  lab work recently by hematologist         Pt reports depression is controlled on medication         Reports increased pain to lower back, unrelieved by Cymbalta in evenings;  Requests medication for additional pain relief     ROS:     CONSTITUTIONAL:  Positive for Pt concerned about weight gain;  states she hopes her \"hormones will settle down.\".      EYES:  Negative for blurred vision.      CARDIOVASCULAR:  Negative for chest pain, orthopnea, paroxysmal nocturnal dyspnea and pedal edema.      RESPIRATORY:  Negative for dyspnea.      GASTROINTESTINAL:  Negative for abdominal pain, constipation, diarrhea, nausea and vomiting.      NEUROLOGICAL:  Negative for dizziness, headaches, paresthesias, and weakness.      PSYCHIATRIC:  Negative for anxiety, depression, and sleep disturbances.          PMH/FMH/SH:     Last Reviewed on 10/19/2017 12:34 PM by Andree Sandhu    Past Medical History:             PAST MEDICAL HISTORY     UNREMARKABLE         GYNECOLOGICAL HISTORY:    G1 (twins) P2    Current method of contraception is Essure;     Chronic Pain: since 2014; affecting the low back;         PREVENTIVE HEALTH MAINTENANCE             MAMMOGRAM: Done within last 2 years and results in are chart was last done 10/2017 with normal results         Surgical History:         Cholecystectomy: laparoscopic; 4-2013;      Tonsillectomy/Adenoidectomy   Uterine Ablation     Procedures: LEEP procedure RADIOACTIVE THYROID " ABLATION 2002 uterine polyp removal july/2017     Hysterectomy: 4-12-18;         Family History:     Father: Healthy     Mother: MVP;  anemia     Brother(s): 0 brother(s) total     Sister(s): 0 sister(s) total         Social History:     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          Alcohol:  Does not drink alcohol and never has.          Substance Abuse History:     Last Reviewed on 10/19/2017 12:34 PM by Andree Sandhu        Mental Health History:     Last Reviewed on 10/19/2017 12:34 PM by Andree Sandhu        Communicable Diseases (eg STDs):     Last Reviewed on 10/19/2017 12:34 PM by Andree Sandhu            Current Problems:     Last Reviewed on 10/19/2017 12:34 PM by Andree Sandhu    Bilateral polycystic ovarian syndrome     Obesity, NOS     Fatigue     Vitamin D deficiency, unspecified     Screening for hyperlipidemia     Screening for cancer of colon     Depression     Low back pain     Cigarette smoking     Acquired hypothyroidism, postablative         Immunizations:     Fluzone (3 + years dose) 10/19/2017         Allergies:     Last Reviewed on 5/31/2018 01:41 PM by Leanna Hernandez    Augmentin: nausea (Adverse Reaction)    Sulfas:        Current Medications:     Last Reviewed on 5/31/2018 01:41 PM by Leanna Hernandez    Cymbalta 30mg Capsules, Delayed Release 1 capsule daily     Levothyroxine Sodium 0.175mg Tablet Take 1 tablet(s) by mouth daily     Doxycycline Monohydrate 100mg Capsules Take 1 capsule(s) by mouth bid     Tylenol         OBJECTIVE:        Vitals:         Current: 5/31/2018 1:40:47 PM    Ht:  5 ft, 6 in;  Wt: 284.4 lbs;  BMI: 45.9    T: 97.9 F (oral);  BP: 126/85 mm Hg (right arm, sitting);  P: 104 bpm (right arm (BP Cuff), sitting);  sCr:  0.82 mg/dL;  GFR: 116.13        Exams:     PHYSICAL EXAM:     GENERAL: vital signs recorded - well developed, well nourished;  no apparent distress;     EYES: extraocular movements intact; conjunctiva and cornea are normal; PERRLA;     E/N/T:  normal EACs, TMs, nasal/oral mucosa, teeth, gingiva, and oropharynx;     NECK: range of motion is normal; thyroid is non-palpable;     RESPIRATORY: normal respiratory rate and pattern with no distress; normal breath sounds with no rales, rhonchi, wheezes or rubs;     CARDIOVASCULAR: normal rate; rhythm is regular;  no systolic murmur; 2+ pedal edema;     GASTROINTESTINAL: nontender; normal bowel sounds; no organomegaly;     NEUROLOGICAL:  cranial nerves, motor and sensory function, reflexes, gait and coordination are all intact;     PSYCHIATRIC:  appropriate affect and demeanor; normal speech pattern; grossly normal memory;         ASSESSMENT:           244.1   E89.0  Acquired hypothyroidism, postablative              DDx:     296.20   F34.1  Depression              DDx:     724.2   M54.5  Low back pain              DDx:         ORDERS:         Meds Prescribed:       Refill of: Levothyroxine Sodium 0.175mg Tablet Take 1 tablet(s) by mouth daily  #90 (Ninety) tablet(s) Refills: 1       Refill of: Cymbalta (Duloxetine HCl) 30mg Capsules, Delayed Release 1 capsule daily  #30 (Thirty) capsule(s) Refills: 3       Diclofenac Sodium 75mg Tablets, Enteric Coated 1 tab bid with food  #60 (Sixty) tablet(s) Refills: 2                 PLAN:          Acquired hypothyroidism, postablative           Prescriptions:       Refill of: Levothyroxine Sodium 0.175mg Tablet Take 1 tablet(s) by mouth daily  #90 (Ninety) tablet(s) Refills: 1           Patient Education Handouts:       Hypothyroidism           Depression           Prescriptions:       Refill of: Cymbalta (Duloxetine HCl) 30mg Capsules, Delayed Release 1 capsule daily  #30 (Thirty) capsule(s) Refills: 3           Patient Education  Handouts:       Depression (Depressive Disorder)           Low back pain         FOLLOW-UP: Advised to call if there is no improvement 2 weeks.  .   Chronic visit follow up           Prescriptions:       Diclofenac Sodium 75mg Tablets, Enteric Coated 1 tab bid with food  #60 (Sixty) tablet(s) Refills: 2           Patient Education Handouts:       Acute Low Back Pain              Patient Recommendations:        For  Low back pain:     Follow-up by phone if no improvement in 2 weeks.                APPOINTMENT INFORMATION:        Monday Tuesday Wednesday Thursday Friday Saturday Sunday            Time:___________________AM  PM   Date:_____________________             CHARGE CAPTURE:           Primary Diagnosis:     244.1 Acquired hypothyroidism, postablative            E89.0    Postprocedural hypothyroidism              Orders:          [id number]   Office/outpatient visit; established patient, level 3  (In-House)           296.20 Depression            F34.1    Dysthymic disorder    724.2 Low back pain            M54.5    Low back pain

## 2021-05-18 NOTE — PROGRESS NOTES
Rosibel Baker 1975     Office/Outpatient Visit    Visit Date: Thu, Jun 28, 2018 01:32 pm    Provider: Andree Sandhu N.P. (Assistant: Anabell Santos LPN)    Location: [facility]        Electronically signed by Andree Sandhu N.P. on  07/01/2018 05:49:50 PM                             SUBJECTIVE:        CC:     Mrs. Baker is a 43 year old White female.  Had seziure on Tuesday night.;         HPI:         Seizure: Pt states last tues, 6-26 she went to bed. Then awoken she thinks around 2am with the buzzing sound, her body looked red, She felt achey all over with nausea and sob. She went to the bathroom and then woke up her . He stated she was having a seizure with her jaw clenching. He stated that she looked like she went to a stare and calm like a statue. He tried to get a carotid pulse and it awakened her. She then hugged him and he got her to the bed. She later went back to sleep and woke up fine. States she fell and hit several items. She hit several fans and a table.  Pt states 2 prior episodes about a week prior to the 26th. States first one she had a buzzing sound to her ears with sob. She went outside and took deep breaths and felt better. The next day she had another episode with this time having nausea and diarrhea. She felt sob and states feeling like it was an allergic reaction. She took a benadryl and felt better. States the only new med was that she started taking diclofenac. The night she woke up with a seizure she had taken diclofenac along with her cymbalta. Pt states she is feeling fine today. However, she has some knots on her head. Denies dizziness, visual changes, headaches.         R ear pain: Pt states ear pain x 1 week. States feeling there may be drainage. Has not taken any meds. Denies fever.      ROS:     CONSTITUTIONAL:  Negative for chills, fatigue, fever, and weight change.      EYES:  Negative for blurred vision.      E/N/T:  Positive for ear pain.       CARDIOVASCULAR:  Negative for chest pain, orthopnea, paroxysmal nocturnal dyspnea and pedal edema.      RESPIRATORY:  Negative for dyspnea.      GASTROINTESTINAL:  Negative for abdominal pain, constipation, diarrhea, nausea and vomiting.      NEUROLOGICAL:  Negative for dizziness, headaches, paresthesias, and weakness.      PSYCHIATRIC:  Negative for anxiety, depression, and sleep disturbances.          PMH/FMH/SH:     Last Reviewed on 6/28/2018 02:18 PM by Andree Sandhu    Past Medical History:             PAST MEDICAL HISTORY     UNREMARKABLE         GYNECOLOGICAL HISTORY:    G1 (twins) P2    Current method of contraception is Essure;     Chronic Pain: since 2014; affecting the low back;         PREVENTIVE HEALTH MAINTENANCE             MAMMOGRAM: Done within last 2 years and results in are chart was last done 10/2017 with normal results         Surgical History:         Cholecystectomy: laparoscopic; 4-2013;      Tonsillectomy/Adenoidectomy   Uterine Ablation     Procedures: LEEP procedure RADIOACTIVE THYROID ABLATION 2002 uterine polyp removal july/2017     Hysterectomy: 4-12-18;         Family History:     Father: Healthy     Mother: MVP;  anemia     Brother(s): 0 brother(s) total     Sister(s): 0 sister(s) total         Social History:     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          Alcohol:  Does not drink alcohol and never has.          Substance Abuse History:     Last Reviewed on 6/28/2018 02:18 PM by Andree Sandhu        Mental Health History:     Last Reviewed on 6/28/2018 02:18 PM by Andree Sandhu        Communicable Diseases (eg STDs):     Last Reviewed on 6/28/2018 02:18 PM by Andree Sandhu            Current Problems:     Last Reviewed on 6/28/2018  02:18 PM by Andree Sandhu    Seizures     Bilateral polycystic ovarian syndrome     Obesity, NOS     Fatigue     Vitamin D deficiency, unspecified     Screening for hyperlipidemia     Screening for cancer of colon     Depression     Low back pain     Cigarette smoking     Acquired hypothyroidism, postablative         Immunizations:     Fluzone (3 + years dose) 10/19/2017         Allergies:     Last Reviewed on 6/28/2018 02:18 PM by Andree Sandhu    Augmentin: nausea (Adverse Reaction)    Sulfas:        Current Medications:     Last Reviewed on 6/28/2018 02:18 PM by Andree Sandhu    Cymbalta 30mg Capsules, Delayed Release 1 capsule daily     Levothyroxine Sodium 0.175mg Tablet Take 1 tablet(s) by mouth daily     Doxycycline Monohydrate 100mg Capsules Take 1 capsule(s) by mouth bid     Tylenol         OBJECTIVE:        Vitals:         Current: 6/28/2018 1:38:11 PM    Ht:  5 ft, 6 in;  Wt: 283.9 lbs;  BMI: 45.8    T: 98.1 F (oral);  BP: 118/70 mm Hg (left arm, sitting);  P: 101 bpm (finger clip, sitting);  sCr: 0.82 mg/dL;  GFR: 116.05        Exams:     PHYSICAL EXAM:     GENERAL: vital signs recorded - well developed, well nourished;  no apparent distress;     EYES: extraocular movements intact; conjunctiva and cornea are normal; PERRLA;     E/N/T: EARS: external auditory canal erythematous bilaterally;  both TMs are bulging;     NECK: range of motion is normal; thyroid is non-palpable;     RESPIRATORY: normal respiratory rate and pattern with no distress; normal breath sounds with no rales, rhonchi, wheezes or rubs;     CARDIOVASCULAR: normal rate; rhythm is regular;  no systolic murmur; no edema;     GASTROINTESTINAL: nontender; normal bowel sounds; no organomegaly;     NEUROLOGICAL:  cranial nerves, motor and sensory function, reflexes, gait and coordination are all intact;     PSYCHIATRIC:  appropriate affect and demeanor; normal speech pattern; grossly normal memory;         Lab/Test Results:          LABORATORY RESULTS: EKG performed by nara         Urine temperature:  confirmed (06/28/2018),     All urine drug screen levels confirmed negative:  yes (06/28/2018),     Date and time of last pill:  new RX (06/28/2018),     Performed by:  nara (06/28/2018),     Collection Time:  1455 (06/28/2018),             ASSESSMENT:           780.39   R56.9  Seizures              DDx:     382.00   H66.001   H66.002  Acute otitis media              DDx:         ORDERS:         Meds Prescribed:       Xanax (Alprazolam) 1mg Tablet 1/2 to 1 as needed for anxiety  #3 (Three) tablet(s) Refills: 0       Cefdinir 300mg Capsules 1 bid for 10 days  #20 (Twenty) capsule(s) Refills: 0         Radiology/Test Orders:       21763  Electrocardiogram, routine with at least 12 leads; with interpretation and report  (In-House)         77990  Computed tomography, head or brain; without contrast material  (Send-Out)         77278  Computed tomography, head or brain; with contrast material(s)  (Send-Out)           Lab Orders:       20890  Drug test prsmv qual dir optical obs per day  (In-House)         79188  BDCBC [facility] CBC with 3 part diff  (Send-Out)         26453  COMP - [facility] Comp. Metabolic Panel  (Send-Out)         42120  TSH [facility] TSH  (Send-Out)         APPTO  Appointment need  (In-House)           Procedures Ordered:       REFER  Referral to Specialist or Other Facility  (Send-Out)                   PLAN:          Seizures     LABORATORY:  Labs ordered to be performed today include CBC, Comprehensive metabolic panel, Drug screen, and TSH.      TESTS/PROCEDURES:  Will proceed with an ECG to be performed/scheduled now.      REFERRALS:  Referral initiated to a neurologist ( Dr. Melani Muñoz; for evaluation of Seizure activity ).      FOLLOW-UP: Schedule a follow-up visit in 6 months..      FOLLOW-UP TESTING #1:    RADIOLOGY:  I have ordered a head CT w/ contrast w/out contrast to be done today.            Prescriptions:       Xanax (Alprazolam)  1mg Tablet 1/2 to 1 as needed for anxiety  #3 (Three) tablet(s) Refills: 0           Orders:       99506  Electrocardiogram, routine with at least 12 leads; with interpretation and report  (In-House)         REFER  Referral to Specialist or Other Facility  (Send-Out)         01887  Computed tomography, head or brain; without contrast material  (Send-Out)         40672  Computed tomography, head or brain; with contrast material(s)  (Send-Out)         18921  Drug test prsmv qual dir optical obs per day  (In-House)         67785  BDCBC - [facility] CBC with 3 part diff  (Send-Out)         13626  COMP - [facility] Comp. Metabolic Panel  (Send-Out)         44344  TSH - [facility] TSH  (Send-Out)         APPTO  Appointment need  (In-House)            Acute otitis media           Prescriptions:       Cefdinir 300mg Capsules 1 bid for 10 days  #20 (Twenty) capsule(s) Refills: 0             Patient Recommendations:        For  Seizures:     Schedule a follow-up visit in 6 months.                APPOINTMENT INFORMATION:        Monday Tuesday Wednesday Thursday Friday Saturday Sunday            Time:___________________AM  PM   Date:_____________________             CHARGE CAPTURE:           Primary Diagnosis:     780.39 Seizures            R56.9    Unspecified convulsions              Orders:          59871   Office/outpatient visit; established patient, level 4  (In-House)             58158   Electrocardiogram, routine with at least 12 leads; with interpretation and report  (In-House)             86028   Drug test prsmv qual dir optical obs per day  (In-House)             APPTO   Appointment need  (In-House)           382.00 Acute otitis media            H66.001    Acute suppurative otitis media without spontaneous rupture of ear drum, right ear           H66.002    Acute suppurative otitis media without spontaneous rupture of ear drum, left ear        ADDENDUMS:      ____________________________________    Addendum: 07/02/2018 04:26 PM -           Visit Note Faxed to:        User Entered Recipient; Number [phone]            Addendum: 07/04/2018 11:38 AM - Andree Sandhu        V58.69 Use of high risk medications     Add:       Z79.899   Other long term (current) drug therapy

## 2021-05-18 NOTE — PROGRESS NOTES
Rosibel Baker 1975     Office/Outpatient Visit    Visit Date: Sat, Oct 27, 2018 10:00 am    Provider: Andree Sandhu N.P. (Assistant: Anna Holley MA)    Location: [facility]        Electronically signed by Andree Sandhu N.P. on  10/27/2018 11:40:37 AM                             SUBJECTIVE:        CC:     Mrs. Baker is a 43 year old White female.  Patient complains of ache in both ears and would like to discuss getting a referral for her back.;         HPI:         Mrs. Baker presents with ear pain.      Pt states bilateral ear pain x 1 week. States a few days ago she took IBF and allergy meds without much relief. She used antibiotic ear drops she had at home yesterday. States that hasn't helped much yet.          Obesity: Pt states changing her diet and eating green leafy and dark fruits and vegetables to help with inflammation.          Additionally, she presents with history of low back pain.  other details: Pt states being in the hospital with her  has flared her back. States low back pain. She has been PT, chiropractic weekly, pain management, neurology with no results. States exercise and chiropractic helps temporarily. 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 She notices changes with weather. MRI showed arthritis. States injury falling on her tailbone years ago but was not seen on xray and MRI..          In regard to the depression, pt's  had a brain aneurysm on 10-10 and has been at [facility] since. He has surgery and then will go to [facility] d/t [facility] being full.      ROS:     CONSTITUTIONAL:  Negative for chills, fatigue, fever, and weight change.      EYES:  Negative for blurred vision.      CARDIOVASCULAR:  Negative for chest pain, orthopnea, paroxysmal nocturnal dyspnea and pedal edema.      RESPIRATORY:  Negative for dyspnea.      GASTROINTESTINAL:  Negative for abdominal pain, constipation, diarrhea, nausea and vomiting.      NEUROLOGICAL:  Negative for  dizziness, headaches, paresthesias, and weakness.      PSYCHIATRIC:  Negative for anxiety, depression, and sleep disturbances.          PMH/FMH/SH:     Last Reviewed on 10/27/2018 10:17 AM by Andree Sandhu    Past Medical History:             PAST MEDICAL HISTORY     UNREMARKABLE         GYNECOLOGICAL HISTORY:    G1 (twins) P2    Current method of contraception is Essure;     Chronic Pain: since 2014; affecting the low back;         PREVENTIVE HEALTH MAINTENANCE             MAMMOGRAM: Done within last 2 years and results in are chart was last done 10/2017 with normal results         Surgical History:         Cholecystectomy: laparoscopic; 4-2013;      Tonsillectomy/Adenoidectomy   Uterine Ablation     Procedures: LEEP procedure RADIOACTIVE THYROID ABLATION 2002 uterine polyp removal july/2017     Hysterectomy: 4-12-18;         Family History:     Father: Healthy     Mother: MVP;  anemia     Brother(s): 0 brother(s) total     Sister(s): 0 sister(s) total         Social History:     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          Alcohol:  Does not drink alcohol and never has.          Substance Abuse History:     Last Reviewed on 10/27/2018 10:17 AM by Andree Sandhu        Mental Health History:     Last Reviewed on 10/27/2018 10:17 AM by Andree Sandhu        Communicable Diseases (eg STDs):     Last Reviewed on 10/27/2018 10:17 AM by Andree Sandhu            Current Problems:     Last Reviewed on 10/27/2018 10:17 AM by Andree Sandhu    Diastolic dysfunction-Grade 1     Seizures     Bilateral polycystic ovarian syndrome     Obesity, NOS     Vitamin D deficiency, unspecified     Depression     Low back pain     Cigarette smoking     Acquired hypothyroidism,  postablative     Ear pain         Immunizations:     Fluzone (3 + years dose) 10/19/2017         Allergies:     Last Reviewed on 10/27/2018 10:17 AM by Andree Sandhu    Augmentin: nausea (Adverse Reaction)    Sulfas:        Current Medications:     Last Reviewed on 10/27/2018 10:17 AM by Andree Sandhu    Cymbalta 30mg Capsules, Delayed Release 1 capsule daily     Synthroid 0.15mg Tablet 1 tab daily     Doxycycline Monohydrate 100mg Capsules Take 1 capsule(s) by mouth bid     Tylenol         OBJECTIVE:        Vitals:         Current: 10/27/2018 10:04:16 AM    Ht:  5 ft, 6 in;  Wt: 273.6 lbs;  BMI: 44.2    T: 98 F (oral);  BP: 132/85 mm Hg (left arm, sitting);  P: 104 bpm (left arm (BP Cuff), sitting);  sCr: 0.7 mg/dL;  GFR: 133.82        Exams:     PHYSICAL EXAM:     GENERAL: vital signs recorded - well developed, well nourished;  no apparent distress;     EYES: extraocular movements intact; conjunctiva and cornea are normal; PERRLA;     E/N/T:  normal EACs, TMs, nasal/oral mucosa, teeth, gingiva, and oropharynx;     NECK: range of motion is normal; thyroid is non-palpable;     RESPIRATORY: normal respiratory rate and pattern with no distress; normal breath sounds with no rales, rhonchi, wheezes or rubs;     CARDIOVASCULAR: normal rate; rhythm is regular;  no systolic murmur; no edema;     GASTROINTESTINAL: nontender; normal bowel sounds; no organomegaly;     NEUROLOGICAL:  cranial nerves, motor and sensory function, reflexes, gait and coordination are all intact;     PSYCHIATRIC:  appropriate affect and demeanor; normal speech pattern; grossly normal memory;         ASSESSMENT:           382.00   H66.001  R otitis media              DDx:     278.00   E66.9  Obesity, NOS              DDx:     724.2   M54.5  Low back pain              DDx:     296.20   F34.1  Depression              DDx:         ORDERS:         Meds Prescribed:       Cefdinir 300mg Capsules 1 bid for 10 days  #20 (Twenty) capsule(s) Refills: 0        Bromfed-DM (Brompheniramine/Dextromethorphan/Pseudoephedrine) Syrup 1  to 2  tsp po every 4-6 hrs prn cough/congestion.  #240 ([place]) ml Refills: 0       Refill of: Cymbalta (Duloxetine HCl) 60mg Capsules, Delayed Release Take 1 capsule(s) by mouth daily  #30 (Thirty) capsule(s) Refills: 0       Mobic (Meloxicam) 15mg Tablet 1 tab daily  #30 (Thirty) tablet(s) Refills: 0         Procedures Ordered:       REFER  Referral to Specialist or Other Facility  (Send-Out)           Other Orders:         Calculated BMI above the upper parameter and a follow-up plan was documented in the medical record  (In-House)                   PLAN:          R otitis media           Prescriptions:       Cefdinir 300mg Capsules 1 bid for 10 days  #20 (Twenty) capsule(s) Refills: 0       Bromfed-DM (Brompheniramine/Dextromethorphan/Pseudoephedrine) Syrup 1  to 2  tsp po every 4-6 hrs prn cough/congestion.  #240 ([place]) ml Refills: 0          Obesity, NOS     MIPS     BMI Elevated - Follow-Up Plan: She was provided education on weight loss strategies           Orders:         Calculated BMI above the upper parameter and a follow-up plan was documented in the medical record  (In-House)            Low back pain         REFERRALS:  Referral initiated to a chronic pain specialist ( [facility] ).            Prescriptions:       Mobic (Meloxicam) 15mg Tablet 1 tab daily  #30 (Thirty) tablet(s) Refills: 0           Orders:       REFER  Referral to Specialist or Other Facility  (Send-Out)            Depression           Prescriptions:       Refill of: Cymbalta (Duloxetine HCl) 60mg Capsules, Delayed Release Take 1 capsule(s) by mouth daily  #30 (Thirty) capsule(s) Refills: 0             CHARGE CAPTURE:           Primary Diagnosis:     382.00 R otitis media            H66.001    Acute suppurative otitis media without spontaneous rupture of ear drum, right ear              Orders:           14448   Office/outpatient visit; established patient, level 4  (In-House)           278.00 Obesity, NOS            E66.9    Obesity, unspecified              Orders:             Calculated BMI above the upper parameter and a follow-up plan was documented in the medical record  (In-House)           724.2 Low back pain            M54.5    Low back pain    296.20 Depression            F34.1    Dysthymic disorder

## 2021-05-18 NOTE — PROGRESS NOTES
Rosibel Baker  1975     Office/Outpatient Visit    Visit Date: Mon, Oct 19, 2020 02:45 pm    Provider: Patricia Osborne N.P. (Assistant: Funmilayo Diaz LPN)    Location: [facility]        Electronically signed by Patricia Osborne N.P. on  10/22/2020 05:39:06 AM                             Subjective:        CC: Mrs. Baker is a 45 year old White female.  INJURED KNEE BACK IN AUGUST. TRIED DOING THINGS ON HER OWN, BUT HASN'T GOTTEN IN RELIEF. HAS SINCE GOTTEN WORSE, SHE IS NOW LIMPING. WAS D/C'D FROM PAIN MGMT.;         HPI:           PHQ-9 Depression Screening: Completed form scanned and in chart; Total Score 1           Mrs. Baker presents with pain in left knee..  The symptom began 4 months ago.  This is the first episode of this type of pain.  First noticed pain in knee in August  Feels like it is giving way and very unstable.   She has noticed progression over time and unable to climb stairs.  She has no swelling in the knee.  Mrs. Baker has been using a knee brace to help with securing the knee     ROS:     CONSTITUTIONAL:  Positive for fatigue.   Negative for chills or fever.      CARDIOVASCULAR:  Negative for chest pain and pedal edema.      RESPIRATORY:  Negative for recent cough and dyspnea.      GASTROINTESTINAL:  Negative for abdominal pain, constipation, diarrhea, heartburn, nausea and vomiting.      GENITOURINARY:  Negative for dysuria and change in urine stream.      MUSCULOSKELETAL:  Positive for arthralgias, (diffuse) back pain ( chronic ), joint stiffness and (left knee) limb pain ( left knee ).   Negative for myalgias.      INTEGUMENTARY/BREAST:  Negative for pruritis and rash.      NEUROLOGICAL:  Positive for paresthesia ( fingers  left ).      ENDOCRINE:  Negative for hair loss, polydipsia and polyphagia.      ALLERGIC/IMMUNOLOGIC:  Negative for seasonal allergies.      PSYCHIATRIC:  Negative for anxiety, depression and suicidal thoughts.          Past Medical History / Family  History / Social History:         Last Reviewed on 7/27/2020 02:24 PM by Delbert Purvis    Past Medical History:             PAST MEDICAL HISTORY     UNREMARKABLE         GYNECOLOGICAL HISTORY:    G1 (twins) P2    Current method of contraception is Essure;     Chronic Pain: since 2014; affecting the low back;         PREVENTIVE HEALTH MAINTENANCE             DENTAL CLEANING: was last done 2020  [facility]     EYE EXAM: was last done 2020 - [facility]     MAMMOGRAM: Done within last 2 years and results in are chart was last done 10/2017 with normal results         PAST MEDICAL HISTORY             CURRENT MEDICAL PROVIDERS:    Dermatologist: Dr. Barry    Obstetrician/Gynecologist: Cherri Seals    Pain Management: [facility]         Surgical History:         Cholecystectomy: laparoscopic; 4-2013;     Tonsillectomy/Adenoidectomy  Uterine Ablation     Procedures: LEEP procedure RADIOACTIVE THYROID ABLATION 2002 uterine polyp removal july/2017     Hysterectomy: 4-12-18;     L oopharectomy 5/2019;         Family History:     Father: Healthy     Mother: MVP;  anemia     Brother(s): 0 brother(s) total     Sister(s): 0 sister(s) total     Paternal Grandmother: Breast Cancer ( at age 70 )         Social History:     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          Alcohol:  Does not drink alcohol and never has.          Substance Abuse History:     Last Reviewed on 7/27/2020 02:24 PM by Delbert Purvis    None         Mental Health History:     Last Reviewed on 7/27/2020 02:24 PM by Delbert Purvis        Generalized Anxiety Disorder     Dysthymic Disorder ( with seasonal pattern )         Communicable Diseases (eg STDs):     Last Reviewed on 7/27/2020 02:24 PM by  Delbert Purvis    Reportable health conditions; NEGATIVE         Current Problems:     Last Reviewed on 7/27/2020 02:24 PM by Delbert Purvis    Postprocedural hypothyroidism    Nicotine dependence, unspecified, uncomplicated    Low back pain    Dysthymic disorder    Vitamin D deficiency, unspecified    Obesity, unspecified    surgically induced menopause (2019)    Encounter for general adult medical examination without abnormal findings    Encounter for other screening for malignant neoplasm of breast    Encounter for screening for depression    Hidradenitis suppurativa    Unspecified otitis externa, right ear    Otitis media, unspecified, right ear    Dysuria        Immunizations:     Hep A, adult dose 1/7/2019    Fluzone (3 + years dose) 10/19/2017    Fluzone (3 + years dose) 1/7/2019        Allergies:     Last Reviewed on 7/27/2020 02:24 PM by Delbert Purvis    Latex:   (Adverse Reaction)    Augmentin: Nausea  (Adverse Reaction)    Diclofenac:      Sulfas:          Current Medications:     Last Reviewed on 7/27/2020 02:24 PM by Delbert Purvis    traMADol 50 mg oral tablet [take 1 tablet (50 mg) by oral route every 4 hours as needed]    Esttradiol 0.1mg [apply patch  twice-weekly]    DULoxetine 60 mg oral capsule,delayed release (enteric coated) [TAKE 1 CAPSULE BY MOUTH EVERY DAY]    levothyroxine 150 mcg oral tablet [TAKE 1 TABLET BY MOUTH EVERY DAY]    Keflex 500 mg oral capsule [take 1 capsule (500 mg) by mouth twice per day]        Objective:        Vitals:         Current: 10/19/2020 3:01:36 PM    Ht:  5 ft, 6 in;  Wt: 282.4 lbs;  BMI: 45.6T: 97.6 F (temporal);  BP: 138/84 mm Hg (right arm, sitting);  P: 92 bpm (right arm (BP Cuff), sitting);  sCr: 0.87 mg/dL;  GFR: 106.91        Exams:     PHYSICAL EXAM:     GENERAL: vital signs recorded - well developed, well nourished;  no apparent distress;     RESPIRATORY: normal appearance and symmetric expansion of chest wall; normal respiratory rate and  pattern with no distress; normal breath sounds with no rales, rhonchi, wheezes or rubs;     CARDIOVASCULAR: normal rate; rhythm is regular;  no edema;     MUSCULOSKELETAL: normal gait; decreased range of motion noted in: left knee extension;  pain with range of motion in: left knee flexion and extension;  no crepitus     NEUROLOGIC: mental status: alert and oriented x 3;     PSYCHIATRIC: appropriate affect and demeanor; normal speech pattern; normal thought and perception;         Assessment:         Z13.31   Encounter for screening for depression       M25.562   Pain in left knee       M25.50   Pain in unspecified joint           ORDERS:         Meds Prescribed:       [New Rx] Mobic 7.5 mg oral tablet [take 1 tablet (7.5 mg) by oral route once daily], #30 (thirty) tablets, Refills: 0 (zero)         Radiology/Test Orders:       [id number]  Left  radiologic examination, knee; three views  (Send-Out)              Lab Orders:       [id number]  VIVIANA - [facility] LARRY w/Reflex  (Send-Out)            [id number]  RAPII - [facility] Arthritis Profile  (Send-Out)              Other Orders:         Depression screen negative  (In-House)                      Plan:         Encounter for screening for depression    MIPS PHQ-9 Depression Screening: Completed form scanned and in chart; Total Score 1; Negative Depression Screen           Orders:         Depression screen negative  (In-House)              Pain in left kneerecommend PT , nsaid Mobic sent and will get xray.  I would recommend that she continue to wear the compression knee sleeve to improve support and consider referral to ortho based on xray        RADIOLOGY:  I have ordered a left knee x-ray to be done today.            Orders:       [id number]  Left  radiologic examination, knee; three views  (Send-Out)              Pain in unspecified joint    LABORATORY:  Labs ordered to be performed today include LARRY with Reflex and Arthritis Profile.            Prescriptions:       [New Rx] Mobic  7.5 mg oral tablet [take 1 tablet (7.5 mg) by oral route once daily], #30 (thirty) tablets, Refills: 0 (zero)           Orders:       [id number]  VIVIANA - [facility] LARRY w/Reflex  (Send-Out)            [id number]  RAPII [facility] Arthritis Profile  (Send-Out)                  Patient Recommendations:        For  Pain in unspecified joint:    I also recommend ^.              Charge Capture:         Primary Diagnosis:     Z13.31  Encounter for screening for depression           Orders:      [id number]  Office/outpatient visit; established patient, level 3  (In-House)              Depression screen negative  (In-House)              M25.562  Pain in left knee     M25.50  Pain in unspecified joint

## 2021-05-18 NOTE — PROGRESS NOTES
Rosibel Baker  1975     Office/Outpatient Visit    Visit Date: Wed, Jun 17, 2020 10:30 am    Provider: Patricia Osborne N.P. (Assistant: Charlee Garcia MA)    Location: [facility]        Electronically signed by Patricia Osborne N.P. on  06/17/2020 01:31:35 PM                             Subjective:        CC: Mrs. Baker is a 45 year old White female.  right ear hurting;         HPI:           Complaint of otalgia, right ear..  The symptom began 1 week ago.  The severity is of moderate intensity.  She estimates that the frequency of this symptom is once every couple of months.  Associated symptoms include no hearing loss, no drainage - but feels swollen and very sore when she touches it.  right ear has been hurting, swollen - last week was put on Doxy and did not improve - howver, she reports that typically Andree has to put her on cipro and it will improve.  [place] and she just started wearing ear plugs     ROS:     CONSTITUTIONAL:  Negative for chills, fatigue and fever.      E/N/T:  Positive for ear pain.   Negative for diminished hearing or tinnitus.      CARDIOVASCULAR:  Negative for chest pain and pedal edema.      RESPIRATORY:  Negative for recent cough and dyspnea.      NEUROLOGICAL:  Negative for headaches.          Past Medical History / Family History / Social History:         Last Reviewed on 6/08/2020 01:21 PM by Patricia Osborne    Past Medical History:             PAST MEDICAL HISTORY     UNREMARKABLE         GYNECOLOGICAL HISTORY:    G1 (twins) P2    Current method of contraception is Essure;     Chronic Pain: since 2014; affecting the low back;         PREVENTIVE HEALTH MAINTENANCE             DENTAL CLEANING: was last done 2020  [facility]     EYE EXAM: was last done 2020 - [facility]     MAMMOGRAM: Done within last 2 years and results in are chart was last done 10/2017 with normal results         PAST MEDICAL HISTORY              CURRENT MEDICAL PROVIDERS:    Dermatologist: Dr. Barry    Obstetrician/Gynecologist: Cherri Seals    Pain Management: [facility]         Surgical History:         Cholecystectomy: laparoscopic; 4-2013;     Tonsillectomy/Adenoidectomy  Uterine Ablation     Procedures: LEEP procedure RADIOACTIVE THYROID ABLATION 2002 uterine polyp removal july/2017     Hysterectomy: 4-12-18;     L oopharectomy 5/2019;         Family History:     Father: Healthy     Mother: MVP;  anemia     Brother(s): 0 brother(s) total     Sister(s): 0 sister(s) total     Paternal Grandmother: Breast Cancer ( at age 70 )         Social History:     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          Alcohol:  Does not drink alcohol and never has.          Substance Abuse History:     Last Reviewed on 6/08/2020 01:21 PM by Patricia Osborne    None         Mental Health History:     Last Reviewed on 6/08/2020 01:21 PM by Patricai Osborne        Generalized Anxiety Disorder     Dysthymic Disorder ( with seasonal pattern )         Communicable Diseases (eg STDs):     Last Reviewed on 6/08/2020 01:21 PM by Patricia Osborne    Reportable health conditions; NEGATIVE         Current Problems:     Last Reviewed on 6/08/2020 01:21 PM by Patricia Osborne    Postprocedural hypothyroidism    Nicotine dependence, unspecified, uncomplicated    Low back pain    Dysthymic disorder    Vitamin D deficiency, unspecified    Obesity, unspecified    surgically induced menopause (2019)    Encounter for general adult medical examination without abnormal findings    Encounter for other screening for malignant neoplasm of breast    Encounter for screening for depression    Hidradenitis suppurativa        Immunizations:      Hep A, adult dose 1/7/2019    Fluzone (3 + years dose) 10/19/2017    Fluzone (3 + years dose) 1/7/2019        Allergies:     Last Reviewed on 6/17/2020 10:34 AM by Charlee Garcia    Latex:   (Adverse Reaction)    Augmentin: Nausea  (Adverse Reaction)    Diclofenac:      Sulfas:          Current Medications:     Last Reviewed on 6/17/2020 10:34 AM by Charlee Garcia    traMADol 50 mg oral tablet [take 1 tablet (50 mg) by oral route every 4 hours as needed]    Esttradiol 0.1mg [apply patch  twice-weekly]    DULoxetine 60 mg oral capsule,delayed release (enteric coated) [TAKE 1 CAPSULE BY MOUTH EVERY DAY]    levothyroxine 150 mcg oral tablet [take 1 tablet (150 mcg) by oral route once daily]        Objective:        Vitals:         Current: 6/17/2020 10:36:22 AM    Ht:  5 ft, 6 in;  Wt: 281.8 lbs;  BMI: 45.5T: 97.6 F (oral);  BP: 129/81 mm Hg (left arm, sitting);  P: 86 bpm (left arm (BP Cuff), sitting);  sCr: 0.87 mg/dL;  GFR: 106.82        Exams:     PHYSICAL EXAM:     GENERAL: vital signs recorded - well developed, well nourished;  no apparent distress;     E/N/T: EARS: external auditory canal draining on the right, edematous on the right, and erythematous on the right;  the right TM is yellow;     NECK: range of motion is normal;     RESPIRATORY: normal appearance and symmetric expansion of chest wall; normal respiratory rate and pattern with no distress; normal breath sounds with no rales, rhonchi, wheezes or rubs;     CARDIOVASCULAR: normal rate; rhythm is regular;  no edema;     LYMPHATIC: right preauricular node ( tender );     MUSCULOSKELETAL: normal gait; normal range of motion of all major muscle groups; no limb or joint pain with range of motion;     NEUROLOGIC: mental status: alert and oriented x 3;     PSYCHIATRIC: appropriate affect and demeanor; normal speech pattern; normal thought and perception;         Assessment:         H60.91   Unspecified otitis externa, right ear       H66.91   Otitis media,  unspecified, right ear           ORDERS:         Meds Prescribed:       [New Rx] ofloxacin 0.3 % otic (ear) Drops [instill 10 drops into affected ear(s) by otic route 2 times per day x 7-10], #10 (ten) milliliters, Refills: 0 (zero)       [New Rx] Cipro 500 mg oral tablet [take 1 tablet (500 mg) by oral route 2 times per day x 10 days], #20 (twenty) tablets, Refills: 0 (zero)       [New Rx] Diflucan 150 mg oral tablet [take 1 tablet (150 mg) by oral route now  may repeat in 1 week], #2 (two) tablets, Refills: 0 (zero)                 Plan:         Unspecified otitis externa, right earavoid swimming, avoid using ear plugs until resolved.  If new or worsen symptoms, follow up in office .            Prescriptions:       [New Rx] ofloxacin 0.3 % otic (ear) Drops [instill 10 drops into affected ear(s) by otic route 2 times per day x 7-10], #10 (ten) milliliters, Refills: 0 (zero)         Otitis media, unspecified, right ear          Prescriptions:       [New Rx] Cipro 500 mg oral tablet [take 1 tablet (500 mg) by oral route 2 times per day x 10 days], #20 (twenty) tablets, Refills: 0 (zero)       [New Rx] Diflucan 150 mg oral tablet [take 1 tablet (150 mg) by oral route now  may repeat in 1 week], #2 (two) tablets, Refills: 0 (zero)             Charge Capture:         Primary Diagnosis:     H60.91  Unspecified otitis externa, right ear           Orders:      [id number]  Office/outpatient visit; established patient, level 3  (In-House)              H66.91  Otitis media, unspecified, right ear

## 2021-05-18 NOTE — PROGRESS NOTES
Rosibel Baker  1975     Office/Outpatient Visit    Visit Date: Wed, Nov 11, 2020 01:06 pm    Provider: Patricia Osborne N.P. (Assistant: Janey Hinds LPN)    Location: [facility]        Electronically signed by Patricia Osborne N.P. on  11/17/2020 07:01:16 AM                             Subjective:        CC: Mrs. Baker is a 45 year old White female.  presents today due to knee pain         HPI:           Pain in left knee noted.  The symptom began 2 days ago.  The severity is of moderate intensity.  This is the first episode of this type of pain.      ROS:     CONSTITUTIONAL:  Negative for chills, fatigue and fever.      CARDIOVASCULAR:  Negative for chest pain and pedal edema.      RESPIRATORY:  Negative for recent cough and dyspnea.      MUSCULOSKELETAL:  Positive for arthralgias and joint stiffness (left knee).   Negative for myalgias.      NEUROLOGICAL:  Positive for weakness ( left knee/ instability ).   Negative for paresthesias.          Past Medical History / Family History / Social History:         Last Reviewed on 7/27/2020 02:24 PM by Delbert Purvis    Past Medical History:             PAST MEDICAL HISTORY     UNREMARKABLE         GYNECOLOGICAL HISTORY:    G1 (twins) P2    Current method of contraception is Essure;     Chronic Pain: since 2014; affecting the low back;         PREVENTIVE HEALTH MAINTENANCE             DENTAL CLEANING: was last done 2020  [facility]     EYE EXAM: was last done 2020 - [facility]     MAMMOGRAM: Done within last 2 years and results in are chart was last done 10/2017 with normal results         PAST MEDICAL HISTORY             CURRENT MEDICAL PROVIDERS:    Dermatologist: Dr. Barry    Obstetrician/Gynecologist: Cherri Seals    Pain Management: [facility]         Surgical History:         Cholecystectomy: laparoscopic; 4-2013;     Tonsillectomy/Adenoidectomy  Uterine Ablation     Procedures: LEEP procedure  RADIOACTIVE THYROID ABLATION 2002 uterine polyp removal july/2017     Hysterectomy: 4-12-18;     L oopharectomy 5/2019;         Family History:     Father: Healthy     Mother: MVP;  anemia     Brother(s): 0 brother(s) total     Sister(s): 0 sister(s) total     Paternal Grandmother: Breast Cancer ( at age 70 )         Social History:     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          Alcohol:  Does not drink alcohol and never has.          Substance Abuse History:     Last Reviewed on 7/27/2020 02:24 PM by Delbert Purvis    None         Mental Health History:     Last Reviewed on 7/27/2020 02:24 PM by Delbert Purvis        Generalized Anxiety Disorder     Dysthymic Disorder ( with seasonal pattern )         Communicable Diseases (eg STDs):     Last Reviewed on 7/27/2020 02:24 PM by Delbert Purvis    Reportable health conditions; NEGATIVE         Current Problems:     Last Reviewed on 7/27/2020 02:24 PM by Delbert Purvis    Postprocedural hypothyroidism    Nicotine dependence, unspecified, uncomplicated    Low back pain    Dysthymic disorder    Vitamin D deficiency, unspecified    Obesity, unspecified    surgically induced menopause (2019)    Hidradenitis suppurativa    Encounter for general adult medical examination without abnormal findings    Encounter for other screening for malignant neoplasm of breast    Encounter for screening for depression    Unspecified otitis externa, right ear    Otitis media, unspecified, right ear    Dysuria    Pain in unspecified joint    Pain in left knee        Immunizations:     Hep A, adult dose 1/7/2019    Fluzone (3 + years dose) 10/19/2017    Fluzone (3 + years dose) 1/7/2019        Allergies:     Last Reviewed on 10/19/2020 03:02 PM by Joe  Funmilayo    Latex:   (Adverse Reaction)    Augmentin: Nausea  (Adverse Reaction)    Diclofenac:      Sulfas:          Current Medications:     Last Reviewed on 10/19/2020 03:02 PM by Funmilayo Diaz    traMADol 50 mg oral tablet [take 1 tablet (50 mg) by oral route every 4 hours as needed]    Esttradiol 0.1mg [apply patch  twice-weekly]    DULoxetine 60 mg oral capsule,delayed release (enteric coated) [TAKE 1 CAPSULE BY MOUTH EVERY DAY]    levothyroxine 150 mcg oral tablet [TAKE 1 TABLET BY MOUTH EVERY DAY]    tiZANidine 2 mg oral tablet    Mobic 7.5 mg oral tablet [take 1 tablet (7.5 mg) by oral route once daily]        Objective:        Vitals:         Current: 11/11/2020 1:09:23 PM    Ht:  5 ft, 6 in;  Wt: 283 lbs;  BMI: 45.7T: 97.6 F (temporal);  BP: 136/97 mm Hg (right arm, sitting);  P: 91 bpm (right arm (BP Cuff), sitting);  sCr: 0.87 mg/dL;  GFR: 107.01        Exams:     PHYSICAL EXAM:     GENERAL: vital signs recorded - well developed, well nourished;  no apparent distress;     RESPIRATORY: normal appearance and symmetric expansion of chest wall; normal respiratory rate and pattern with no distress; normal breath sounds with no rales, rhonchi, wheezes or rubs;     CARDIOVASCULAR: normal rate; rhythm is regular;  no edema;     MUSCULOSKELETAL: gait: affected by a left leg limp;  normal range of motion of all major muscle groups; pain with range of motion in: left knee flexion, extension, and internal rotation;     NEUROLOGIC: mental status: alert and oriented x 3;     PSYCHIATRIC: appropriate affect and demeanor; normal speech pattern; normal thought and perception;         Assessment:         M25.562   Pain in left knee       M25.50   Pain in unspecified joint           ORDERS:         Radiology/Test Orders:       [id number]  Left MRI, any joint of lower extremity w/o contrast  (Send-Out)                      Plan:         Pain in left kneeWill have her continue with the knee sleeve, NSAIDs and may need referral to  ortho         FOLLOW-UP TESTING #1:    RADIOLOGY:  I have ordered a left w/o contrast knee MRI to be done today.            Orders:       [id number]  Left MRI, any joint of lower extremity w/o contrast  (Send-Out)                  Charge Capture:         Primary Diagnosis:     M25.562  Pain in left knee           Orders:      [id number]  Office/outpatient visit; established patient, level 4  (In-House)              M25.50  Pain in unspecified joint

## 2021-05-28 ENCOUNTER — TRANSCRIBE ORDERS (OUTPATIENT)
Dept: PHYSICAL THERAPY | Facility: CLINIC | Age: 46
End: 2021-05-28

## 2021-05-28 DIAGNOSIS — S83.222D PERIPHERAL TEAR OF MEDIAL MENISCUS OF LEFT KNEE AS CURRENT INJURY, SUBSEQUENT ENCOUNTER: ICD-10-CM

## 2021-05-28 DIAGNOSIS — Z98.890 S/P ARTHROSCOPIC SURGERY OF LEFT KNEE: Primary | ICD-10-CM

## 2021-06-03 ENCOUNTER — HOSPITAL ENCOUNTER (OUTPATIENT)
Dept: OTHER | Facility: HOSPITAL | Age: 46
Discharge: HOME | End: 2021-06-03
Attending: NURSE PRACTITIONER

## 2021-06-03 LAB
ALBUMIN SERPL-MCNC: 4 G/DL (ref 3.5–5)
ALBUMIN/GLOB SERPL: 1.3 {RATIO} (ref 1.4–2.6)
ALP SERPL-CCNC: 106 U/L (ref 42–98)
ALT SERPL-CCNC: 20 U/L (ref 10–40)
ANION GAP SERPL CALC-SCNC: 15 MMOL/L (ref 8–19)
AST SERPL-CCNC: 19 U/L (ref 15–50)
BILIRUB SERPL-MCNC: 0.16 MG/DL (ref 0.2–1.3)
BUN SERPL-MCNC: 18 MG/DL (ref 5–25)
BUN/CREAT SERPL: 20 {RATIO} (ref 6–20)
CALCIUM SERPL-MCNC: 9.2 MG/DL (ref 8.7–10.4)
CHLORIDE SERPL-SCNC: 105 MMOL/L (ref 99–111)
CHOLEST SERPL-MCNC: 141 MG/DL (ref 107–200)
CHOLEST/HDLC SERPL: 3.5 {RATIO} (ref 3–6)
CONV CO2: 21 MMOL/L (ref 22–32)
CONV TOTAL PROTEIN: 7 G/DL (ref 6.3–8.2)
CREAT UR-MCNC: 0.91 MG/DL (ref 0.5–0.9)
ERYTHROCYTE [DISTWIDTH] IN BLOOD BY AUTOMATED COUNT: 14 % (ref 11.5–14.5)
GFR SERPLBLD BASED ON 1.73 SQ M-ARVRAT: >60 ML/MIN/{1.73_M2}
GLOBULIN UR ELPH-MCNC: 3 G/DL (ref 2–3.5)
GLUCOSE SERPL-MCNC: 115 MG/DL (ref 65–99)
HBA1C MFR BLD: 14.5 G/DL (ref 12–16)
HCT VFR BLD AUTO: 43.6 % (ref 37–47)
HDLC SERPL-MCNC: 40 MG/DL (ref 40–60)
LDLC SERPL CALC-MCNC: 79 MG/DL (ref 70–100)
MCH RBC QN AUTO: 30 PG (ref 27–31)
MCHC RBC AUTO-ENTMCNC: 33.3 G/DL (ref 33–37)
MCV RBC AUTO: 90.3 FL (ref 81–99)
OSMOLALITY SERPL CALC.SUM OF ELEC: 287 MOSM/KG (ref 273–304)
PLATELET # BLD AUTO: 209 10*3/UL (ref 130–400)
PMV BLD AUTO: 11.4 FL (ref 7.4–10.4)
POTASSIUM SERPL-SCNC: 4.3 MMOL/L (ref 3.5–5.3)
RBC # BLD AUTO: 4.83 10*6/UL (ref 4.2–5.4)
SODIUM SERPL-SCNC: 137 MMOL/L (ref 135–147)
TRIGL SERPL-MCNC: 112 MG/DL (ref 40–150)
TSH SERPL-ACNC: 1.13 M[IU]/L (ref 0.27–4.2)
VLDLC SERPL-MCNC: 22 MG/DL (ref 5–37)
WBC # BLD AUTO: 9.65 10*3/UL (ref 4.8–10.8)

## 2021-06-04 LAB
EST. AVERAGE GLUCOSE BLD GHB EST-MCNC: 123 MG/DL
HBA1C MFR BLD: 5.9 % (ref 3.5–5.7)

## 2021-06-05 NOTE — PROGRESS NOTES
oRsibel Baker  1975     Office/Outpatient Visit    Visit Date: Tue, May 18, 2021 02:20 pm    Provider: Patricia Osborne N.P. (Assistant: Ivy Vieira MA)    Location: [facility]        Electronically signed by Patricia Osborne N.P. on  05/25/2021 07:28:22 PM                             Subjective:        CC: Mrs. Baker is a 46 year old White female.  This is a follow-up visit.  med refills, labs (check thyroid), follow up on knee surgery;         HPI: Going to weight loss clinic in [place]  Dr. Austin  taking phentermine - started in March            In regard to the postprocedural hypothyroidism, she denies any related symptoms.  She reports no symptoms suggestive of adverse medication effect.  last TSH 1 year ago  slightly low (0.288) did not come in for repeat           Additionally, she presents with history of dysthymic disorder.  still taking duloxetine feels stable on curent dose     ROS:     CONSTITUTIONAL:  Negative for chills, fatigue and fever.      CARDIOVASCULAR:  Negative for chest pain and pedal edema.      RESPIRATORY:  Negative for recent cough and dyspnea.      MUSCULOSKELETAL:  Positive for joint stiffness and (left knee) limb pain ( knee pain left  recent surgery  but much improved from previou ).   Negative for arthralgias or myalgias.      NEUROLOGICAL:  Negative for dizziness, fainting, headaches and paresthesias.      ENDOCRINE:  Negative for hair loss, polydipsia and polyphagia.      PSYCHIATRIC:  Positive for anxiety, depression and feelings of stress.   Negative for suicidal thoughts.          Past Medical History / Family History / Social History:         Last Reviewed on 5/18/2021 02:34 PM by Patricia Osborne    Past Medical History:             PAST MEDICAL HISTORY     UNREMARKABLE         GYNECOLOGICAL HISTORY:    G1 (twins) P2    Current method of contraception is Essure;     Chronic Pain: since 2014; affecting the low back;         PREVENTIVE HEALTH  MAINTENANCE             DENTAL CLEANING: was last done 2020  [facility]     EYE EXAM: was last done 2020 - [facility]     MAMMOGRAM: Done within last 2 years and results in are chart was last done 10/2017 with normal results         PAST MEDICAL HISTORY             CURRENT MEDICAL PROVIDERS:    Dermatologist: Dr. Barry    Obstetrician/Gynecologist: Cherri Seals    Pain Management: [facility]         Surgical History:         Cholecystectomy: laparoscopic; 4-2013;     Tonsillectomy/Adenoidectomy  Uterine Ablation     Procedures: LEEP procedure RADIOACTIVE THYROID ABLATION 2002 uterine polyp removal july/2017     Hysterectomy: 4-12-18;     L oopharectomy 5/2019;         Family History:     Father: Healthy     Mother: MVP;  anemia     Brother(s): 0 brother(s) total     Sister(s): 0 sister(s) total     Paternal Grandmother: Breast Cancer ( at age 70 )         Social History:     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          Alcohol:  Does not drink alcohol and never has.          Substance Abuse History:     Last Reviewed on 7/27/2020 02:24 PM by Delbert Purvis    None         Mental Health History:     Last Reviewed on 7/27/2020 02:24 PM by Delbert Purvis        Generalized Anxiety Disorder     Dysthymic Disorder ( with seasonal pattern )         Communicable Diseases (eg STDs):     Last Reviewed on 7/27/2020 02:24 PM by Delbert Purvis    Reportable health conditions; NEGATIVE         Current Problems:     Last Reviewed on 5/18/2021 02:34 PM by Patricia Osborne    Postprocedural hypothyroidism    Nicotine dependence, unspecified, uncomplicated    Low back pain    Dysthymic disorder    Vitamin D deficiency, unspecified    Obesity, unspecified     surgically induced menopause (2019)    Hidradenitis suppurativa    Pain in unspecified joint    Pain in left knee    Encounter for screening for malignant neoplasm of colon    Encounter for screening for cardiovascular disorders        Immunizations:     Hep A, adult dose 1/7/2019    Fluzone (3 + years dose) 10/19/2017    Fluzone (3 + years dose) 1/7/2019    SARS-COV-2 (COVID-19) vaccine, UNSPECIFIED 5/6/2021        Allergies:     Last Reviewed on 5/18/2021 02:23 PM by Ivy Vieira    Latex:   (Adverse Reaction)    Augmentin: Nausea  (Adverse Reaction)    Diclofenac:      Sulfas:          Current Medications:     Last Reviewed on 5/18/2021 02:23 PM by Ivy Vieira    Esttradiol 0.1mg [apply patch  twice-weekly]    DULoxetine 60 mg oral capsule,delayed release (enteric coated) [TAKE ONE CAPSULE BY MOUTH EVERY DAY]    levothyroxine 150 mcg oral tablet [TAKE 1 TABLET BY MOUTH EVERY DAY]    meloxicam 7.5 mg oral tablet [TAKE ONE TABLET BY MOUTH EVERY DAY]    phentermine 37.5 mg oral tablet        Objective:        Vitals:         Historical:     11/11/2020  Wt:   283lbs    Current: 5/18/2021 2:29:12 PM    Ht:  5 ft, 6 in;  Wt: 277.6 lbs;  BMI: 44.8T: 97.4 F (temporal);  BP: 118/72 mm Hg (left arm, sitting);  P: 109 bpm (left arm (BP Cuff), sitting);  sCr: 0.87 mg/dL;  GFR: 105.04        Exams:     PHYSICAL EXAM:     GENERAL: vital signs recorded - well developed, well nourished;  no apparent distress;     NECK: range of motion is normal;     RESPIRATORY: normal appearance and symmetric expansion of chest wall; normal respiratory rate and pattern with no distress; normal breath sounds with no rales, rhonchi, wheezes or rubs;     CARDIOVASCULAR: normal rate; rhythm is regular;  no edema;     MUSCULOSKELETAL: normal gait; normal range of motion of all major muscle groups; pain with range of motion in: left knee;     NEUROLOGIC: mental status: alert and oriented x 3;     PSYCHIATRIC: appropriate affect and  demeanor; normal speech pattern; normal thought and perception;         Assessment:         M25.562   Pain in left knee       E89.0   Postprocedural hypothyroidism       F34.1   Dysthymic disorder       Z12.11   Encounter for screening for malignant neoplasm of colon       Z13.6   Encounter for screening for cardiovascular disorders           ORDERS:         Meds Prescribed:       [Refilled] meloxicam 7.5 mg oral tablet [TAKE ONE TABLET BY MOUTH EVERY DAY PRN ], #30 (thirty) tablets, Refills: 5 (five)       [Refilled] levothyroxine 150 mcg oral tablet [TAKE 1 TABLET BY MOUTH EVERY DAY], #30 (thirty) tablets, Refills: 5 (five)       [Refilled] DULoxetine 60 mg oral capsule,delayed release (enteric coated) [TAKE ONE CAPSULE BY MOUTH EVERY DAY], #30 (thirty) capsules, Refills: 5 (five)         Lab Orders:       [id number]  TSH - [facility] TSH  (Send-Out)            FUTURE  Future order to be done at patients convenience  (Send-Out)            16898  BDCB2 [facility] CBC w/o diff  (Send-Out)            59893  COMP [facility] Comp. Metabolic Panel  (Send-Out)            89505  DP [facility] Lipid Panel  (Send-Out)              Procedures Ordered:       REFER  Referral to Specialist or Other Facility  (Send-Out)                      Plan:         Pain in left kneeFollow up with Dr. Godoy as recommended           Prescriptions:       [Refilled] meloxicam 7.5 mg oral tablet [TAKE ONE TABLET BY MOUTH EVERY DAY PRN ], #30 (thirty) tablets, Refills: 5 (five)         Postprocedural hypothyroidism    LABORATORY:  Labs ordered to be performed today include TSH.            Prescriptions:       [Refilled] levothyroxine 150 mcg oral tablet [TAKE 1 TABLET BY MOUTH EVERY DAY], #30 (thirty) tablets, Refills: 5 (five)           Orders:       [id number]  TSH [facility] TSH  (Send-Out)              Dysthymic disorderContinue current treatment with current dose  no changes needed at this [place] e          Prescriptions:       [Refilled] DULoxetine 60 mg oral capsule,delayed  release (enteric coated) [TAKE ONE CAPSULE BY MOUTH EVERY DAY], #30 (thirty) capsules, Refills: 5 (five)         Encounter for screening for malignant neoplasm of colon        REFERRALS:  Referral initiated to a general surgeon ( a colonoscopy ).            Orders:       REFER  Referral to Specialist or Other Facility  (Send-Out)              Encounter for screening for cardiovascular disorders        FOLLOW-UP TESTING #1: FOLLOW-UP LABORATORY:  Labs to be scheduled in the future include CBC without diff, CMP, and lipid panel.            Orders:       FUTURE  Future order to be done at patients convenience  (Send-Out)            82075  BDCB2 - [facility] CBC w/o diff  (Send-Out)            48593  COMP - [facility] Comp. Metabolic Panel  (Send-Out)            32045  DP - [facility] Lipid Panel  (Send-Out)                  Patient Recommendations:        For  Encounter for screening for cardiovascular disorders:            The following laboratory testing has been ordered: metabolic panel, comprehensive lipid panel             Charge Capture:         Primary Diagnosis:     M25.562  Pain in left knee           Orders:      59373  Office/outpatient visit; established patient, level 4  (In-House)              E89.0  Postprocedural hypothyroidism     F34.1  Dysthymic disorder     Z12.11  Encounter for screening for malignant neoplasm of colon     Z13.6  Encounter for screening for cardiovascular disorders

## 2021-06-05 NOTE — PROGRESS NOTES
Rosibel Baker  1975     Office/Outpatient Visit    Visit Date: Mon, Jul 27, 2020 09:41 am    Provider: Delbert Purvis MD (Assistant: Sherry Lazaro RN)    Location: [facility]        Electronically signed by Delbert Purvis MD on  07/27/2020 02:26:52 PM                             Subjective:        CC: Mrs. Baker is a 45 year old White female.  Dysuria (Herborium Groupimity [phone]);         HPI:       Pt has pain with urination for about a week. No worsening of her chronic back pain. No discharge but urine looks cloudy. She has an allergy to augmentin and diclofenac andsulfa drugs.     ROS:     CONSTITUTIONAL:  Negative for chills, fatigue and fever.      EYES:  Positive for use of glasses and contact lenses.      CARDIOVASCULAR:  Positive for pedal edema.   Negative for chest pain.      RESPIRATORY:  Negative for recent cough, dyspnea and frequent wheezing.      GASTROINTESTINAL:  Negative for abdominal pain, constipation, diarrhea, heartburn, nausea and vomiting.      GENITOURINARY:  Positive for dysuria.   Negative for vaginal discharge, vaginal itching or change in urine stream.      MUSCULOSKELETAL:  Positive for back pain and joint stiffness.   Negative for arthralgias or myalgias.      NEUROLOGICAL:  Positive for headaches.   Negative for dizziness.      PSYCHIATRIC:  Negative for anxiety, crying spells, depression, feelings of stress, difficulty concentrating, sleep disturbance and suicidal thoughts.          Past Medical History / Family History / Social History:         Last Reviewed on 7/27/2020 02:24 PM by Delbert Purvis    Past Medical History:             PAST MEDICAL HISTORY     UNREMARKABLE         GYNECOLOGICAL HISTORY:    G1 (twins) P2    Current method of contraception is Essure;     Chronic Pain: since 2014; affecting the low back;         PREVENTIVE HEALTH MAINTENANCE             DENTAL CLEANING: was last done 2020  [facility]     EYE EXAM: was last done 2020 -  [facility]     MAMMOGRAM: Done within last 2 years and results in are chart was last done 10/2017 with normal results         PAST MEDICAL HISTORY             CURRENT MEDICAL PROVIDERS:    Dermatologist: Dr. Barry    Obstetrician/Gynecologist: Cherri Seals    Pain Management: [facility]         Surgical History:         Cholecystectomy: laparoscopic; 4-2013;     Tonsillectomy/Adenoidectomy  Uterine Ablation     Procedures: LEEP procedure RADIOACTIVE THYROID ABLATION 2002 uterine polyp removal july/2017     Hysterectomy: 4-12-18;     L oopharectomy 5/2019;         Family History:     Father: Healthy     Mother: MVP;  anemia     Brother(s): 0 brother(s) total     Sister(s): 0 sister(s) total     Paternal Grandmother: Breast Cancer ( at age 70 )         Social History:     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          Alcohol:  Does not drink alcohol and never has.          Substance Abuse History:     Last Reviewed on 7/27/2020 02:24 PM by Delbert Purvis    None         Mental Health History:     Last Reviewed on 7/27/2020 02:24 PM by Delbert Purvis        Generalized Anxiety Disorder     Dysthymic Disorder ( with seasonal pattern )         Communicable Diseases (eg STDs):     Last Reviewed on 7/27/2020 02:24 PM by Delbert Purvis    Reportable health conditions; NEGATIVE         Current Problems:     Last Reviewed on 7/27/2020 02:24 PM by Delbert Purvis    Postprocedural hypothyroidism    Nicotine dependence, unspecified, uncomplicated    Low back pain    Dysthymic disorder    Vitamin D deficiency, unspecified    Obesity, unspecified    surgically induced menopause (2019)    Encounter for general adult medical examination without abnormal findings     Encounter for other screening for malignant neoplasm of breast    Encounter for screening for depression    Hidradenitis suppurativa    Unspecified otitis externa, right ear    Otitis media, unspecified, right ear    Dysuria        Immunizations:     Hep A, adult dose 1/7/2019    Fluzone (3 + years dose) 10/19/2017    Fluzone (3 + years dose) 1/7/2019        Allergies:     Last Reviewed on 7/27/2020 02:24 PM by Delbert Purvis    Latex:   (Adverse Reaction)    Augmentin: Nausea  (Adverse Reaction)    Diclofenac:      Sulfas:          Current Medications:     Last Reviewed on 7/27/2020 02:24 PM by Delbert Purvis    Esttradiol 0.1mg [apply patch  twice-weekly]    traMADol 50 mg oral tablet [take 1 tablet (50 mg) by oral route every 4 hours as needed]    DULoxetine 60 mg oral capsule,delayed release (enteric coated) [TAKE 1 CAPSULE BY MOUTH EVERY DAY]    levothyroxine 150 mcg oral tablet [take 1 tablet (150 mcg) by oral route once daily]        Objective:        Exams:     PHYSICAL EXAM:     GENERAL: well developed, well nourished, obese;  well groomed;  no apparent distress;     EYES: extraocular movements intact;     NECK: range of motion is normal;     RESPIRATORY: normal respiratory rate and pattern with no distress;     CARDIOVASCULAR: no cyanosis;     MUSCULOSKELETAL: normal gait;     NEUROLOGIC: mental status: alert and oriented x 3; GROSSLY INTACT     PSYCHIATRIC: appropriate affect and demeanor; normal psychomotor function; normal speech pattern; normal thought and perception;         Assessment:         R30.0   Dysuria           ORDERS:         Meds Prescribed:       [New Rx] Keflex 500 mg oral capsule [take 1 capsule (500 mg) by mouth twice per day], #10 (ten) capsules, Refills: 0 (zero)         Lab Orders:       [id number]  [facility] Urinalysis, automated, with micro  (Send-Out)                      Plan:         DysuriaKeflex is prescribed for UTI, pt agrees to come in for UA prior to starting keflex. Pt  advised UTI may be related to, or more likely resistant to Abx given her somewhat frequent use of Abx for ear infections.     LABORATORY:  Labs ordered to be performed today include urinalysis with micro.  Telehealth: Verbal consent obtained for visit to occur via televideo conferencing; Staff, other than provider, present during telephone visit include Ravindra Lazaro           Prescriptions:       [New Rx] Keflex 500 mg oral capsule [take 1 capsule (500 mg) by mouth twice per day], #10 (ten) capsules, Refills: 0 (zero)           Orders:       [id number]  [facility] Urinalysis, automated, with micro  (Send-Out)                  Charge Capture:         Primary Diagnosis:     R30.0  Dysuria           Orders:      72608  Office/outpatient visit; established patient, level 3  (In-House)                      none

## 2021-06-08 ENCOUNTER — HOSPITAL ENCOUNTER (OUTPATIENT)
Dept: PHYSICAL THERAPY | Facility: CLINIC | Age: 46
Setting detail: RECURRING SERIES
Discharge: HOME | End: 2021-06-15
Attending: ORTHOPAEDIC SURGERY

## 2021-06-08 ENCOUNTER — TREATMENT (OUTPATIENT)
Dept: PHYSICAL THERAPY | Facility: CLINIC | Age: 46
End: 2021-06-08

## 2021-06-08 DIAGNOSIS — Z98.890 S/P ARTHROSCOPIC KNEE SURGERY: Primary | ICD-10-CM

## 2021-06-08 DIAGNOSIS — S83.222A PERIPHERAL TEAR OF MEDIAL MENISCUS OF LEFT KNEE AS CURRENT INJURY, INITIAL ENCOUNTER: ICD-10-CM

## 2021-06-08 PROCEDURE — 97110 THERAPEUTIC EXERCISES: CPT | Performed by: PHYSICAL THERAPIST

## 2021-06-08 PROCEDURE — 97035 APP MDLTY 1+ULTRASOUND EA 15: CPT | Performed by: PHYSICAL THERAPIST

## 2021-06-08 NOTE — PROGRESS NOTES
"Re-Assessment / Re-Certification        Patient: Rosibel Baker   : 1975  Diagnosis/ICD-10 Code:  S/P arthroscopic knee surgery [Z98.890]  Referring practitioner: Roly Godoy MD  Date of Initial Visit: Type: THERAPY  Noted: 2021  Today's Date: 2021  Patient seen for 1 sessions      Subjective:     Subjective Questionnaire: LEFS   Clinical Progress:  IMPROVED  Home Program Compliance: YES  Treatment has included: therapeutic exercise, manual therapy and ultrasound    Subjective Evaluation    History of Present Illness  Mechanism of injury: Rosibel presents to OP PT with left knee pain post op menisectomy 2021.  In late July/early 2020, Rosibel started noticing knee pain and couldn't stand for periods of time. She came to OP PT previously for pain in . She had an MRI and follow up with ortho MD, surgery was decided. She works at a desk. She cannot stand for long periods of time without pain.  The swelling has decreased since surgery.  She is no longer using an assistive device, stopped last week. She was referred for OP PT.    Subjective comment: 4/10 pain today, she was on her feet alot this weekend, \"my didn't like what I did this weekend\".  She has noticed some  bruises and swelling on her knee, didn't know if that could be from poor circulation.Pain  Current pain ratin    Patient Goals  Patient goals for therapy: decreased edema, decreased pain and increased strength         Objective   Assessment & Plan     Assessment  Impairments: abnormal gait, impaired physical strength and pain with function  Assessment details: Ultrasound helpful today in improving tightness of the medial knee/adductor/hamstrings for the LLE.  Followed up with several stretches, added in long sitting hamstring and adductor stretch to help maintain and improve flexibility.     Goals  Plan Goals: KNEE PROBLEMS:  1. The patient has limited ROM of the left knee.   LTG 1: 6 weeks:  The patient will demonstrate 0 to " 120 degrees of ROM for the left knee in order to allow patient to perform ADLs, recreational activities, and stair navigation.    STATUS:  Met   STG 1a: 3 weeks:  The patient will demonstrate -3 to 115 degrees of ROM for the left knee.    STATUS:  Met   TREATMENT: Manual therapy, therapeutic exercise, home exercise instruction, and modalities as needed to include:  moist heat, electrical stimulation, ultrasound, and ice.    2. The patient has limited strength of the left knee.   LTG 2: 12 weeks: The patient will demonstrate 5/5 strength for left knee flexion and extension in order to allow patient improved joint stability    STATUS:  Progressing   STG 2a: 6 weeks: The patient will demonstrate 4/5 strength for left knee flexion and extension    STATUS:  Met   TREATMENT: Manual therapy, therapeutic exercise, home exercise instruction, aquatic therapy, and modalities as needed to include:  moist heat, electrical stimulation, ultrasound, and ice.     3. The patient has gait dysfunction.   LTG 3: 12 weeks:  The patient will ambulate without assistive device, independently, for community distances with minimal limp to the left lower extremity in order to improve mobility and allow patient to perform activities such as grocery shopping with greater ease.    STATUS:  Met no AD, slight limp still present   TREATMENT: Gait training, aquatic therapy, therapeutic exercise, and home exercise instruction.    Plan  Therapy options: will be seen for skilled physical therapy services  Planned modality interventions: ultrasound  Planned therapy interventions: soft tissue mobilization, flexibility, functional ROM exercises, stretching, strengthening and manual therapy  Frequency: 3x month  Plan details: Continue to improve overall stretching and flexibility for the LLE, manage pain of the L medial meniscus region, pes anserine.          Visit Diagnoses:    ICD-10-CM ICD-9-CM   1. S/P arthroscopic knee surgery  Z98.890 V45.89   2.  Peripheral tear of medial meniscus of left knee as current injury, initial encounter  S83.222A 836.0       Progress toward previous goals: Partially Met        Recommendations: Continue as planned  Timeframe: 3 months  Prognosis to achieve goals: good    PT Signature: Rashida Amato, PT, DPT      Based upon review of the patient's progress and continued therapy plan, it is my medical opinion that Rosibel Baker should continue physical therapy treatment at [facility]  [address] [place] [id number] [phone].    Signature: __________________________________  Roly Godoy MD    Timed:  Manual Therapy:         mins  27887;  Therapeutic Exercise:    15     mins  85453;     Neuromuscular Joey:        mins  54548;    Therapeutic Activity:          mins  86977;     Gait Training:           mins  84962;     Ultrasound:     8     mins  44935;    Electrical Stimulation:         mins  88423 ( );    Untimed:  Electrical Stimulation:         mins  97328 ( );  Mechanical Traction:         mins  51482;     Timed Treatment:   23   mins   Total Treatment:     31   mins

## 2021-06-12 DIAGNOSIS — Z12.11 SCREENING FOR COLON CANCER: Primary | ICD-10-CM

## 2021-06-15 ENCOUNTER — TREATMENT (OUTPATIENT)
Dept: PHYSICAL THERAPY | Facility: CLINIC | Age: 46
End: 2021-06-15

## 2021-06-15 DIAGNOSIS — S83.222A PERIPHERAL TEAR OF MEDIAL MENISCUS OF LEFT KNEE AS CURRENT INJURY, INITIAL ENCOUNTER: ICD-10-CM

## 2021-06-15 DIAGNOSIS — Z98.890 S/P ARTHROSCOPIC KNEE SURGERY: Primary | ICD-10-CM

## 2021-06-15 PROCEDURE — 97110 THERAPEUTIC EXERCISES: CPT | Performed by: PHYSICAL THERAPIST

## 2021-06-15 NOTE — PROGRESS NOTES
Physical Therapy Daily Treatment Note      Patient: Rosibel aBker   : 1975  Referring practitioner: Roly Godoy MD  Date of Initial Visit: Type: THERAPY  Noted: 2021  Today's Date: 6/15/2021  Patient seen for 2 sessions           Subjective Evaluation    History of Present Illness    Subjective comment: 3/10 pain on the medial portion of the L knee, overall feels better.  She feels like she is ready for discharge.         Objective          Active Range of Motion   Left Knee   Flexion: 127 degrees   Extension: 0 degrees     Strength/Myotome Testing     Left Knee   Flexion: 5  Extension: 5      See Exercise, Manual, and Modality Logs for complete treatment.       Assessment & Plan     Assessment  Assessment details: Rosibel will be discharged from PT services today.  Reviewed and performed exercises for HEP, continue with stretches as tolerated daily.   She has met her goals for ROM, strength, pain, and gait.  Today, she was not limping, but she does note some limp when the pain is high.     Goals  Plan Goals: 1. The patient has limited ROM of the left knee.              LTG 1: 6 weeks:  The patient will demonstrate 0 to 120 degrees of ROM for the left knee in order to allow patient to perform ADLs, recreational activities, and stair navigation.                          STATUS:  Met              STG 1a: 3 weeks:  The patient will demonstrate -3 to 115 degrees of ROM for the left knee.                          STATUS:  Met              TREATMENT: Manual therapy, therapeutic exercise, home exercise instruction, and modalities as needed to include:  moist heat, electrical stimulation, ultrasound, and ice.    2. The patient has limited strength of the left knee.              LTG 2: 12 weeks: The patient will demonstrate 5/5 strength for left knee flexion and extension in order to allow patient improved joint stability                          STATUS:  Met              STG 2a: 6 weeks: The patient will demonstrate  4/5 strength for left knee flexion and extension                          STATUS:  Met              TREATMENT: Manual therapy, therapeutic exercise, home exercise instruction, aquatic therapy, and modalities as needed to include:  moist heat, electrical stimulation, ultrasound, and ice.                3. The patient has gait dysfunction.              LTG 3: 12 weeks:  The patient will ambulate without assistive device, independently, for community distances with minimal limp to the left lower extremity in order to improve mobility and allow patient to perform activities such as grocery shopping with greater ease.                          STATUS:  Met               TREATMENT: Gait training, aquatic therapy, therapeutic exercise, and home exercise instruction.    Plan  Treatment plan discussed with: patient  Plan details: Discharge, continue with HEP.        Visit Diagnoses:    ICD-10-CM ICD-9-CM   1. S/P arthroscopic knee surgery  Z98.890 V45.89   2. Peripheral tear of medial meniscus of left knee as current injury, initial encounter  S83.222A 836.0       Anticipate DC next Visit           Timed:  Manual Therapy:    4     mins  18847;  Therapeutic Exercise:    20     mins  08317;     Neuromuscular Joey:        mins  03668;    Therapeutic Activity:          mins  46427;     Gait Training:           mins  03460;     Ultrasound:          mins  77570;    Electrical Stimulation:         mins  05349 ( );    Untimed:  Electrical Stimulation:         mins  76566 ( );  Mechanical Traction:         mins  83268;   Dry Needling:                      mins self pay    Timed Treatment:   24   mins   Total Treatment:     32   mins    Rashida Amato, PT, DPT  Physical Therapist      Discharge Summary  Discharge Summary from Physical Therapy Report    Patient Information  Rosibel Baker  1975    Dates  PT visit:   Number of Visits: 2    Goals: All Met    Visit Diagnoses:    ICD-10-CM ICD-9-CM   1. S/P arthroscopic knee  surgery  Z98.890 V45.89   2. Peripheral tear of medial meniscus of left knee as current injury, initial encounter  S83.222A 836.0       Discharge Plan: Continue with current home exercise program as instructed        Date of Discharge 6/15/21        Rashida Amato, PT, DPT  Physical Therapist

## 2021-07-01 VITALS
HEART RATE: 72 BPM | TEMPERATURE: 98.3 F | SYSTOLIC BLOOD PRESSURE: 109 MMHG | DIASTOLIC BLOOD PRESSURE: 75 MMHG | WEIGHT: 276.2 LBS | BODY MASS INDEX: 44.39 KG/M2 | HEIGHT: 66 IN

## 2021-07-01 VITALS
BODY MASS INDEX: 45.71 KG/M2 | DIASTOLIC BLOOD PRESSURE: 85 MMHG | TEMPERATURE: 97.9 F | WEIGHT: 284.4 LBS | HEART RATE: 104 BPM | HEIGHT: 66 IN | SYSTOLIC BLOOD PRESSURE: 126 MMHG

## 2021-07-01 VITALS
DIASTOLIC BLOOD PRESSURE: 70 MMHG | HEIGHT: 66 IN | BODY MASS INDEX: 45.62 KG/M2 | SYSTOLIC BLOOD PRESSURE: 118 MMHG | HEART RATE: 101 BPM | TEMPERATURE: 98.1 F | WEIGHT: 283.9 LBS

## 2021-07-01 VITALS
SYSTOLIC BLOOD PRESSURE: 134 MMHG | TEMPERATURE: 98.5 F | HEIGHT: 66 IN | BODY MASS INDEX: 43.68 KG/M2 | HEART RATE: 106 BPM | DIASTOLIC BLOOD PRESSURE: 96 MMHG | WEIGHT: 271.8 LBS

## 2021-07-01 VITALS
TEMPERATURE: 97.3 F | DIASTOLIC BLOOD PRESSURE: 76 MMHG | HEIGHT: 66 IN | WEIGHT: 281 LBS | BODY MASS INDEX: 45.16 KG/M2 | SYSTOLIC BLOOD PRESSURE: 109 MMHG | HEART RATE: 90 BPM

## 2021-07-01 VITALS
WEIGHT: 273.6 LBS | HEIGHT: 66 IN | TEMPERATURE: 98 F | DIASTOLIC BLOOD PRESSURE: 85 MMHG | BODY MASS INDEX: 43.97 KG/M2 | SYSTOLIC BLOOD PRESSURE: 132 MMHG | HEART RATE: 104 BPM

## 2021-07-01 VITALS
TEMPERATURE: 97.5 F | SYSTOLIC BLOOD PRESSURE: 117 MMHG | BODY MASS INDEX: 43.23 KG/M2 | WEIGHT: 269 LBS | HEIGHT: 66 IN | DIASTOLIC BLOOD PRESSURE: 80 MMHG | HEART RATE: 93 BPM

## 2021-07-01 VITALS
TEMPERATURE: 98.1 F | BODY MASS INDEX: 42.62 KG/M2 | HEIGHT: 66 IN | WEIGHT: 265.2 LBS | SYSTOLIC BLOOD PRESSURE: 135 MMHG | HEART RATE: 95 BPM | DIASTOLIC BLOOD PRESSURE: 84 MMHG

## 2021-07-02 VITALS
DIASTOLIC BLOOD PRESSURE: 97 MMHG | TEMPERATURE: 97.6 F | HEIGHT: 66 IN | HEART RATE: 91 BPM | SYSTOLIC BLOOD PRESSURE: 136 MMHG | BODY MASS INDEX: 45.48 KG/M2 | WEIGHT: 283 LBS

## 2021-07-02 VITALS
WEIGHT: 280.4 LBS | HEART RATE: 87 BPM | SYSTOLIC BLOOD PRESSURE: 105 MMHG | TEMPERATURE: 98.2 F | DIASTOLIC BLOOD PRESSURE: 83 MMHG | HEIGHT: 66 IN | BODY MASS INDEX: 45.06 KG/M2

## 2021-07-02 VITALS
HEART RATE: 109 BPM | SYSTOLIC BLOOD PRESSURE: 118 MMHG | HEIGHT: 66 IN | WEIGHT: 277.6 LBS | DIASTOLIC BLOOD PRESSURE: 72 MMHG | TEMPERATURE: 97.4 F | BODY MASS INDEX: 44.61 KG/M2

## 2021-07-02 VITALS
BODY MASS INDEX: 45.29 KG/M2 | DIASTOLIC BLOOD PRESSURE: 81 MMHG | WEIGHT: 281.8 LBS | TEMPERATURE: 97.6 F | HEART RATE: 86 BPM | SYSTOLIC BLOOD PRESSURE: 129 MMHG | HEIGHT: 66 IN

## 2021-07-02 VITALS
BODY MASS INDEX: 45.38 KG/M2 | WEIGHT: 282.4 LBS | TEMPERATURE: 97.6 F | HEIGHT: 66 IN | SYSTOLIC BLOOD PRESSURE: 138 MMHG | DIASTOLIC BLOOD PRESSURE: 84 MMHG | HEART RATE: 92 BPM

## 2021-07-05 PROBLEM — E28.9 OVARIAN DYSFUNCTION: Status: ACTIVE | Noted: 2021-07-05

## 2021-07-05 PROBLEM — Z72.0 TOBACCO ABUSE: Status: ACTIVE | Noted: 2021-07-05

## 2021-07-05 PROBLEM — E66.9 OBESITY: Status: ACTIVE | Noted: 2021-07-05

## 2021-07-05 PROBLEM — E55.9 VITAMIN D DEFICIENCY: Status: ACTIVE | Noted: 2021-07-05

## 2021-07-05 PROBLEM — F32.A DEPRESSION: Status: ACTIVE | Noted: 2021-07-05

## 2021-07-05 PROBLEM — H66.90 OTITIS MEDIA: Status: ACTIVE | Noted: 2021-07-05

## 2021-07-05 PROBLEM — R56.9 SEIZURE: Status: ACTIVE | Noted: 2021-07-05

## 2021-07-05 PROBLEM — M54.9 BACK PAIN: Status: ACTIVE | Noted: 2021-07-05

## 2021-07-05 PROBLEM — E03.9 HYPOTHYROIDISM: Status: ACTIVE | Noted: 2021-07-05

## 2021-08-12 ENCOUNTER — OFFICE VISIT (OUTPATIENT)
Dept: ORTHOPEDIC SURGERY | Facility: CLINIC | Age: 46
End: 2021-08-12

## 2021-08-12 VITALS — TEMPERATURE: 98.2 F | WEIGHT: 277 LBS | HEIGHT: 66 IN | BODY MASS INDEX: 44.52 KG/M2

## 2021-08-12 DIAGNOSIS — M25.562 LEFT KNEE PAIN, UNSPECIFIED CHRONICITY: ICD-10-CM

## 2021-08-12 DIAGNOSIS — Z98.890 S/P ARTHROSCOPIC KNEE SURGERY: Primary | ICD-10-CM

## 2021-08-12 PROCEDURE — 99213 OFFICE O/P EST LOW 20 MIN: CPT | Performed by: ORTHOPAEDIC SURGERY

## 2021-08-12 RX ORDER — MELOXICAM 7.5 MG/1
TABLET ORAL
COMMUNITY
Start: 2021-07-26 | End: 2021-11-29

## 2021-08-12 NOTE — PROGRESS NOTES
"Orthopedic office note.    NAME: Rosibel Baker  ?  : 1975  ?  MRN: [id number]    Chief Complaint   Patient presents with   • Left Knee - Follow-up, Pain     ?  Date of surgery: 2021    HPI:   Patient returns today for 6.5 month follow up of left knee arthroscopy. Arthroscopic portals healed nicely with no signs of infection. Patient reports doing well with no unusual complaints. Appears to be progressing appropriately.  The patient states that she did very well after the knee arthroscopy.  The clicking and popping of her knee settled down quite nicely.  Unfortunately her pain has recurred.  She has difficulty going up and down the steps.  She has difficulty with squatting on the ground.  She states \"my knee hurts me just about all the time.  She has difficulty with walking on inclined surfaces.  I am concerned about either a recurrent tear or advancing osteoarthritis in her situation.  Unfortunately she weighs 277 pounds and her increased BMI of 44.7 is definitely a negative risk factor in causing continued knee pain and discomfort.      Ortho Exam:   Left knee. Patient is 6.5 month(s) post knee arthoscopy. Arthroscopic portals are clean and healing well. Mild effusion is noted. There is no evidence of a deep seated joint infection. Range of motion is 0-130 degrees of flexion. Neurovascular status is intact. There is no anterior or posterior instability. The ACL function appears to be well preserved. Gait is cautious and slightly antalgic but otherwise fairly normal. The sharp, stabbing pain that the patient had prior to the surgery has completely settled down. The calf is soft and non-tender and there is no clinical evidence of a DVT.      Diagnostic Studies:  no diagnostic testing performed this visit      Assessment:  Diagnoses and all orders for this visit:    1. S/P arthroscopic knee surgery (Primary)    2. Left knee pain, unspecified chronicity  -     MRI Knee Left Without Contrast; Future  -     " Visco Treatment; Future  -     XR Knee 1 or 2 View Left; Future  -     diazePAM (Valium) 10 MG tablet; Take 1 pill one hour prior to MRI; please have someone drive you to and from MRI  Dispense: 1 tablet; Refill: 0          Plan     • Continue ice as needed  • Aggressive ROM  • Stretching and strengthening exercises of the quads and the hamstrings.  • We will go ahead and get an MRI of the knee for evaluation of possibility of recurrent medial meniscus tear versus advancing osteoarthritis with loss of articular cartilage.  • Use a supportive brace on the knee to prevent it from buckling and giving out.  • Tablet Valium 10 mg tab one 1 hour prior to the MRI because she states that she is very claustrophobic.  • Recent benefits of Monovisc viscosupplementation injections discussed with the patient at length.  We will call her insurance company and see if she will be allowed to go through with viscosupplementation injections.  • Controlled substance treatment options discussed in detail. Patient's signed consent to medical options on file. GREG form in chart.  The patient is being provided this narcotic prescription to address the acute medical condition that they are undergoing/experiencing at this time.  It is my medical and surgical assessment that more than 3 days of narcotic medication is necessary to help control the pain and discomfort that this patient is experiencing at this point.  Risks of narcotic medication usage outlined.  Possibility of physical and psychological dependence and abuse, especially long term, emphasized to the patient.  I have explained to the patient that we will try to wean them off the narcotic medication as soon as possible and introduce non-narcotic modalities of pain control.  At this point in the patient's clinical spectrum, however, alternative available treatments are inadequate to control their pain and symptoms.  I have also discussed the possibility of random drug testing as  well as pill counts to prevent misuse and misappropriation of the narcotic medications prescribed to the patient.  • Alternate Ibuprofen and Tylenol as needed  • Fall precautions.  • The patient was counseled regarding reduction of BMI including, but not limited to the following: Reduction of portion sizes, reduction of carbohydrate intake, decreased calorie count, increased physical exercise and activity, counseling with a professional such as a registered dietitian and consultation with a bariatric surgeon for possible bariatric procedure.  Should the patient decide to proceed with a bariatric procedure this would not be considered a cosmetic procedure for this patient, but a means to improve the quality of life and to improve the musculoskeletal function.  Patient's current BMI is 44.73 and her body weight is 277 pounds and would need to lose weight which is required for elective orthopedic surgery to minimize perioperative complications.  • Schedule MRI of left knee, follow up once this has been completed.   • Call for Monovisc for the left knee       Date of encounter: 08/12/2021  Roly Godoy MD

## 2021-08-16 ENCOUNTER — TELEPHONE (OUTPATIENT)
Dept: ORTHOPEDIC SURGERY | Facility: CLINIC | Age: 46
End: 2021-08-16

## 2021-08-16 NOTE — TELEPHONE ENCOUNTER
INSURANCE WILL NOT PAY FOR GEL INJECTIONS, SHE WILL HAVE TO RECEIVE CORTISONE INJ FIRST AND ALSO HAVE THE MRI RESULTS   no

## 2021-08-18 ENCOUNTER — HOSPITAL ENCOUNTER (OUTPATIENT)
Dept: GENERAL RADIOLOGY | Facility: HOSPITAL | Age: 46
Discharge: HOME | End: 2021-08-18
Admitting: ORTHOPAEDIC SURGERY

## 2021-08-18 DIAGNOSIS — M25.562 LEFT KNEE PAIN, UNSPECIFIED CHRONICITY: ICD-10-CM

## 2021-08-18 PROCEDURE — 73560 X-RAY EXAM OF KNEE 1 OR 2: CPT

## 2021-08-24 RX ORDER — DIAZEPAM 10 MG/1
TABLET ORAL
Qty: 1 TABLET | Refills: 0 | Status: SHIPPED | OUTPATIENT
Start: 2021-08-24 | End: 2021-11-01

## 2021-08-26 ENCOUNTER — HOSPITAL ENCOUNTER (OUTPATIENT)
Dept: MRI IMAGING | Facility: HOSPITAL | Age: 46
Discharge: HOME | End: 2021-08-26
Admitting: ORTHOPAEDIC SURGERY

## 2021-08-26 DIAGNOSIS — M25.562 LEFT KNEE PAIN, UNSPECIFIED CHRONICITY: ICD-10-CM

## 2021-08-26 PROCEDURE — 73721 MRI JNT OF LWR EXTRE W/O DYE: CPT | Performed by: RADIOLOGY

## 2021-08-26 PROCEDURE — 73721 MRI JNT OF LWR EXTRE W/O DYE: CPT

## 2021-09-02 ENCOUNTER — OFFICE VISIT (OUTPATIENT)
Dept: ORTHOPEDIC SURGERY | Facility: CLINIC | Age: 46
End: 2021-09-02

## 2021-09-02 VITALS — WEIGHT: 270 LBS | HEIGHT: 66 IN | BODY MASS INDEX: 43.39 KG/M2 | TEMPERATURE: 97.3 F

## 2021-09-02 DIAGNOSIS — Z98.890 S/P ARTHROSCOPIC KNEE SURGERY: ICD-10-CM

## 2021-09-02 DIAGNOSIS — M17.12 PRIMARY OSTEOARTHRITIS OF LEFT KNEE: Primary | ICD-10-CM

## 2021-09-02 PROCEDURE — 99214 OFFICE O/P EST MOD 30 MIN: CPT | Performed by: ORTHOPAEDIC SURGERY

## 2021-09-02 NOTE — PROGRESS NOTES
"Chief Complaint  Follow-up and Pain of the Left Knee and Results (MRI RESULTS)    Subjective    History of Present Illness      Rosibel Baker is a 46 y.o. female who presents to [facility] for follow-up on left knee persistent pain.  History of Present Illness this patient has been having ongoing pain and discomfort over the knee.  This is affecting her day-to-day function.  She is unable to perform activities of daily living without pain and discomfort.  She has been trying to lose some weight but states that she cannot exercise at all and that is preventing her from losing weight.  She states that she has pain on a daily basis.  She has tried physical therapy but that has really not helped her symptoms to any degree.  The patient states that she has difficulty with going up and down the steps and with the squatting on the ground.  There is no doubt in my mind that she is going to need partial or total knee replacement fairly soon to improve her quality of life and to minimize her symptoms.  Pain Location:  LEFT knee  Radiation: none  Quality: aching, sharp  Intensity/Severity: mild-moderate  Duration: About 1 year  Progression of symptoms: no worsening, symptoms stable/unchanged  Onset quality: gradual   Timing: intermittent  Aggravating Factors: going up and down stairs, walking, squatting  Alleviating Factors: Tylenol, rest, brace, ice  Previous Episodes: yes  Associated Symptoms: pain  ADLs Affected: ambulating, work related activities, recreational activities/sports  Previous Treatment: prior surgery       Objective   Vital Signs:   Temp 97.3 °F (36.3 °C)   Ht 167.6 cm (66\")   Wt 122 kg (270 lb)   BMI 43.58 kg/m²     Physical Exam  Physical Exam  Vitals signs and nursing note reviewed.   Constitutional:       Appearance: Normal appearance.   Pulmonary:      Effort: Pulmonary effort is normal.   Skin:     General: Skin is warm and dry.      Capillary Refill: Capillary refill takes " less than 2 seconds.   Neurological:      General: No focal deficit present.      Mental Status: He is alert and oriented to person, place, and time. Mental status is at baseline.   Psychiatric:         Mood and Affect: Mood normal.         Behavior: Behavior normal.         Thought Content: Thought content normal.         Judgment: Judgment normal.     Ortho Exam   Left knee (effusion). The knee is swollen. The patella is ballotable. There is thickening of the synovial membrane. There appears to be a fluid flow in the supra-patellar space. The patient's knee feels tight in flexion. Range of motion is restricted because of the limited flexion. There is some quadriceps inhibition on account of the distension of the joint. There is diffuse tenderness around the knee. The popliteal fossa is full and tender as well. No evidence of a compartmental syndrome is noted. Anterior and posterior drawer signs are negative. No medial or lateral instability is noted. Pivot shift sign is negative. Dorsalis pedis and posterior tibial artery pulses are palpable. Common peroneal nerve function is well preserved.       left knee. Patient is 7 month(s) post knee arthoscopy. Arthroscopic portals are clean and healing well. Mild effusion is noted. There is no evidence of a deep seated joint infection. Range of motion is 0-120 degrees of flexion. Neurovascular status is intact. There is no anterior or posterior instability. The ACL function appears to be well preserved. Gait is cautious and slightly antalgic but otherwise fairly normal. The sharp, stabbing pain that the patient had prior to the surgery has completely settled down. The calf is soft and non-tender and there is no clinical evidence of a DVT.    Result Review :   The following data was reviewed by: Roly Godoy MD on 09/02/2021:  Radiologic studies - see below for interpretation  Reviewed MRI report of left knee, performed at  [facility] on 08/26/2021,  summary of impression below:  MRI report is available in the office today.  These images are discussed with the patient at length.  The images are consistent with decreased meniscal size following partial meniscectomy.  There are moderate osteoarthritic changes involving the medial compartment primarily.  The ACL and the PCL are intact.  There is moderate to severe cartilage thinning over the weightbearing aspect of the medial femoral condyle and the medial tibial plateau measuring 1.8 x 2.2 cm.  There is an osteophyte formation with bone spur noted.  These changes are consistent with initially my recommendation for viscosupplementation injection and subsequently partial medial compartmental knee replacement.          Procedures           Assessment   Assessment and Plan    Diagnoses and all orders for this visit:    1. Primary osteoarthritis of left knee (Primary)  -     Visco Treatment; Future    2. S/P arthroscopic knee surgery    Other orders  -     Cancel: Large Joint Arthrocentesis          Follow Up   · Compression/brace to the knee to prevent it from buckling and giving out.  · Risks and benefits of viscosupplementation injection discussed with the patient at length and she understands and accepts that.  We will call the patient's insurance company and proceed with viscosupplementation injection.  · Extensive discussion with the patient about the risks and benefits of partial versus total knee arthroplasty to the extent of 45 minutes.  The patient was seen today for preoperative discussion.  The patient has been tried on over-the-counter and prescription NSAID's despite the risks of anti-inflammatory bleeding, peptic ulcers and erosive gastritis with short term benefit only.  Braces have been prescribed for mechanical support.  Patient has been participating in an exercise program specifically targeting joint pain relief with limited benefit. Intraarticular injections have been used periodically with some  but not complete relief of pain.  Ambulation aids have also been utilized.      · The details of the surgical procedure were explained including the location of probable incisions and a description of the likely hardware/grafts to be used. The patient understands the likely convalescence after surgery as well as the rehabilitation required.  Also, we have thoroughly discussed with the patient the risks, benefits and alternatives to surgery.  Risks include but are not limited to the risk of infection, joint stiffness, limited range of motion, wound healing problems, scar tissue build up, myocardial infarction, stroke, blood clots (including DVT and/or pulmonary embolus along with the risk of death) neurologic and/or vascular injury, limb length discrepancy, fracture, dislocation, nonunion, malunion, continued pain and need for further surgery including hardware failure requiring revision.   · Rest, ice, compression, and elevation (RICE) therapy  · Stretching and strengthening exercises of the quads and the hamstrings.  · Calcium and vitamin D for bone health.  · The patient was counseled regarding reduction of BMI including, but not limited to the following: Reduction of portion sizes, reduction of carbohydrate intake, decreased calorie count, increased physical exercise and activity, counseling with a professional such as a registered dietitian and consultation with a bariatric surgeon for possible bariatric procedure.  Should the patient decide to proceed with a bariatric procedure this would not be considered a cosmetic procedure for this patient, but a means to improve the quality of life and to improve the musculoskeletal function.  Patient's current BMI is 43.58 and her body weight is 270 pounds and would need to lose weight which is required for elective orthopedic surgery to minimize perioperative complications.  · OTC Alternate Ibuprofen and Tylenol as needed  · Follow up in 3-4 week(s)  • Patient was given  instructions and counseling regarding her condition or for health maintenance advice. Please see specific information pulled into the AVS if appropriate.     Roly Godoy MD   Date of Encounter: 9/2/2021        EMR Dragon/Transcription disclaimer:  Much of this encounter note is an electronic transcription/translation of spoken language to printed text. The electronic translation of spoken language may permit erroneous, or at times, nonsensical words or phrases to be inadvertently transcribed; Although I have reviewed the note for such errors, some may still exist.

## 2021-09-16 ENCOUNTER — TELEPHONE (OUTPATIENT)
Dept: ORTHOPEDIC SURGERY | Facility: CLINIC | Age: 46
End: 2021-09-16

## 2021-09-16 NOTE — TELEPHONE ENCOUNTER
I LEFT A VOICEMAIL WITH PATIENT THAT HER INSURANCE WILL NOT COVER THE MONOVISC INJ BUT SHE CAN STILL GET THE CORTISONE INJ AT HER UPCOMING APPOINTMENT

## 2021-09-21 PROBLEM — M17.12 PRIMARY OSTEOARTHRITIS OF LEFT KNEE: Status: ACTIVE | Noted: 2021-09-21

## 2021-09-23 ENCOUNTER — OFFICE VISIT (OUTPATIENT)
Dept: ORTHOPEDIC SURGERY | Facility: CLINIC | Age: 46
End: 2021-09-23

## 2021-09-23 VITALS — TEMPERATURE: 98 F | BODY MASS INDEX: 43.39 KG/M2 | HEIGHT: 66 IN | WEIGHT: 270 LBS

## 2021-09-23 DIAGNOSIS — Z98.890 S/P ARTHROSCOPIC KNEE SURGERY: Primary | ICD-10-CM

## 2021-09-23 DIAGNOSIS — M17.12 PRIMARY OSTEOARTHRITIS OF LEFT KNEE: ICD-10-CM

## 2021-09-23 PROCEDURE — 20610 DRAIN/INJ JOINT/BURSA W/O US: CPT | Performed by: ORTHOPAEDIC SURGERY

## 2021-09-23 RX ADMIN — METHYLPREDNISOLONE ACETATE 160 MG: 80 INJECTION, SUSPENSION INTRA-ARTICULAR; INTRALESIONAL; INTRAMUSCULAR; SOFT TISSUE at 15:37

## 2021-09-23 NOTE — PROGRESS NOTES
"Chief Complaint  Follow-up and Pain of the Left Knee and Injections    Subjective    History of Present Illness      Rosibel Baker is a 46 y.o. female who presents to [facility] for Patient returns today for left knee pain.  Her pain is located over the medial and lateral aspect of the joint.  The pain has been progressive in nature and remains intermittent .  Her pain is worsened by going up and down stairs, rising after sitting, walking. There has been improvement in the past with injections.  The patient states that she cannot perform any type of physical activity because of the pain and discomfort.  She does understand that she is morbidly obese with a BMI of 43.6 but is unable to walk for exercise and is looking for some treatment options which could prevent her from a premature partial or total knee arthroplasty.  Unfortunately her insurance company has denied viscosupplementation injections to the knee.  I think it would be a very useful adjunct for this patient's care to do the viscosupplementation injections because it would definitely delay the onset of need for partial versus total knee arthroplasty.      Objective   Vital Signs:   Temp 98 °F (36.7 °C)   Ht 167.6 cm (65.98\")   Wt 122 kg (270 lb)   BMI 43.60 kg/m²     Physical Exam  46 y.o. female is awake, alert, oriented, in no acute distress and well developed, well nourished.  Ortho Exam   LEFT knee  Left knee (varus). Patient has crepitus throughout range of motion. Positive patellar grind test. Mild effusion. Lachman is negative. Pivot shift is negative. Anterior and posterior drawer signs are negative. Significant joint line tenderness is noted on the medial aspect of the knee. Patient has a varus orientation of the knee. There is fullness and tenderness in the Popliteal fossa. Mild distention of a Popliteal cyst is noted in this location. Range of motion in flexion is from 0-110 degrees. Neurovascular status is " intact.  Dorsalis pedis and posterior tibial artery pulses are palpable. Common peroneal nerve function is well preserved. Patient's gait is cautious and antalgic. Skin and soft tissues are mildly swollen, consistent with synovitis and effusion. The patient has a significant limp with the first few steps after starting the gait cycle. Getting out of a chair takes a lot of effort due to pain on knee flexion.       Result Review :                  Large Joint Arthrocentesis: L knee  Date/Time: 9/23/2021 3:37 PM  Consent given by: patient  Site marked: site marked  Timeout: Immediately prior to procedure a time out was called to verify the correct patient, procedure, equipment, support staff and site/side marked as required   Supporting Documentation  Indications: pain and joint swelling   Procedure Details  Location: knee - L knee  Preparation: Patient was prepped and draped in the usual sterile fashion  Needle size: 25 G  Approach: anteromedial  Medications administered: 160 mg methylPREDNISolone acetate 80 MG/ML; 2 mL lidocaine (cardiac)  Patient tolerance: patient tolerated the procedure well with no immediate complications               Assessment   Assessment and Plan    Problem List Items Addressed This Visit        Musculoskeletal and Injuries    S/P arthroscopic knee surgery - Primary    Primary osteoarthritis of left knee          Follow Up   · Injected patient's left knee joint(s)with Depo-Medrol from an anteromedial approach   · Compression/brace   · Rest, ice, compression, and elevation (RICE) therapy  · OTC Alternate Ibuprofen and Tylenol as needed  · Follow up in 3 month(s)  • Patient was given instructions and counseling regarding her condition or for health maintenance advice. Please see specific information pulled into the AVS if appropriate.     Roly Godoy MD   Date of Encounter: 9/23/2021        EMR Dragon/Transcription disclaimer:  Much of this encounter note is an electronic  transcription/translation of spoken language to printed text. The electronic translation of spoken language may permit erroneous, or at times, nonsensical words or phrases to be inadvertently transcribed; Although I have reviewed the note for such errors, some may still exist.

## 2021-09-24 RX ORDER — METHYLPREDNISOLONE ACETATE 80 MG/ML
160 INJECTION, SUSPENSION INTRA-ARTICULAR; INTRALESIONAL; INTRAMUSCULAR; SOFT TISSUE
Status: COMPLETED | OUTPATIENT
Start: 2021-09-23 | End: 2021-09-23

## 2021-11-01 ENCOUNTER — OFFICE VISIT (OUTPATIENT)
Dept: FAMILY MEDICINE CLINIC | Age: 46
End: 2021-11-01

## 2021-11-01 VITALS
DIASTOLIC BLOOD PRESSURE: 84 MMHG | SYSTOLIC BLOOD PRESSURE: 125 MMHG | HEIGHT: 66 IN | WEIGHT: 278 LBS | TEMPERATURE: 97.9 F | HEART RATE: 103 BPM | BODY MASS INDEX: 44.68 KG/M2

## 2021-11-01 DIAGNOSIS — Z12.11 SCREENING FOR COLON CANCER: Primary | ICD-10-CM

## 2021-11-01 DIAGNOSIS — E66.01 CLASS 3 SEVERE OBESITY DUE TO EXCESS CALORIES WITHOUT SERIOUS COMORBIDITY WITH BODY MASS INDEX (BMI) OF 40.0 TO 44.9 IN ADULT (HCC): ICD-10-CM

## 2021-11-01 PROBLEM — M25.562 ARTHRALGIA OF BOTH KNEES: Status: ACTIVE | Noted: 2019-08-08

## 2021-11-01 PROBLEM — M25.561 ARTHRALGIA OF BOTH KNEES: Status: ACTIVE | Noted: 2019-08-08

## 2021-11-01 PROBLEM — M51.369 DEGENERATION OF LUMBAR INTERVERTEBRAL DISC: Status: ACTIVE | Noted: 2018-11-13

## 2021-11-01 PROBLEM — M47.817 LUMBOSACRAL SPONDYLOSIS WITHOUT MYELOPATHY: Status: ACTIVE | Noted: 2018-12-04

## 2021-11-01 PROBLEM — M51.36 DEGENERATION OF LUMBAR INTERVERTEBRAL DISC: Status: ACTIVE | Noted: 2018-11-13

## 2021-11-01 PROCEDURE — 99213 OFFICE O/P EST LOW 20 MIN: CPT | Performed by: NURSE PRACTITIONER

## 2021-11-01 NOTE — PROGRESS NOTES
Chief Complaint  Rosibel Baker presents to [facility] for Diabetes (f/u )    Subjective          Prediabetes level in June 5.9%  Opted not to take medication at that time  Has been on Metformin in the past - unable to tolerate secondary to GI side effects.  Interested in Ozempic/Victoza    Needs Colonoscopy rescheduled  Richie Wright              Review of Systems      Allergies   Allergen Reactions   • Diclofenac Hives and Anaphylaxis     SEIZURE     • Latex Hives and Rash   • Sulfa Antibiotics Hives   • Erythromycin Nausea Only      Past Medical History:   Diagnosis Date   • Anxiety and depression    • Back pain    • Disease of thyroid gland    • History of kidney stones    • Hypothyroidism    • Medial meniscus tear    • Obesity    • Otitis media    • Ovarian dysfunction    • PONV (postoperative nausea and vomiting)    • Rash     LONG TERM, LEFT BREAST. CLOSED   • Seizure (HCC)    • Tobacco abuse    • Vitamin D deficiency      Current Outpatient Medications   Medication Sig Dispense Refill   • acetaminophen (Tylenol) 325 MG tablet Tylenol 325 mg oral tablet take 1 tablet by oral route every 4 hours as needed   Active     • aspirin 325 MG tablet Take 1 tablet by mouth Daily. 30 tablet 0   • DULoxetine (CYMBALTA) 60 MG capsule Take 60 mg by mouth Daily.     • estradiol (VIVELLE-DOT) 0.1 MG/24HR patch Place 1 patch on the skin as directed by provider 2 (Two) Times a Week.     • levothyroxine (SYNTHROID, LEVOTHROID) 150 MCG tablet Take 150 mcg by mouth Daily.     • meloxicam (MOBIC) 7.5 MG tablet      • tiZANidine (ZANAFLEX) 2 MG tablet Take 2 mg by mouth As Needed.       No current facility-administered medications for this visit.     Past Surgical History:   Procedure Laterality Date   • ADENOIDECTOMY     • CHOLECYSTECTOMY     • CHOLECYSTECTOMY     • HYSTERECTOMY     • HYSTERECTOMY     • KNEE ARTHROSCOPY Left 1/29/2021    Procedure: KNEE ARTHROSCOPY, PARTIAL MEDIAL MENISECTOMY;   "Surgeon: Roly Godoy MD;  Location: [facility] OR [facility] [place] [facility];  Service: Orthopedics;  Laterality: Left;   • POLYPECTOMY     • THYROID SURGERY     • TONSILLECTOMY        Social History     Tobacco Use   • Smoking status: Current Every Day Smoker     Packs/day: 1.00     Years: 25.00     Pack years: 25.00     Types: Cigarettes   • Smokeless tobacco: Never Used   Vaping Use   • Vaping Use: Never used   Substance Use Topics   • Alcohol use: Yes     Comment: RARELY   • Drug use: Never     Family History   Problem Relation Age of Onset   • Heart disease Maternal Grandmother    • Diabetes Maternal Grandmother    • Diabetes Paternal Grandfather    • Diabetes Other    • Malig Hyperthermia Neg Hx      Health Maintenance Due   Topic Date Due   • COLORECTAL CANCER SCREENING  Never done   • ANNUAL PHYSICAL  Never done   • Pneumococcal Vaccine 0-64 (1 of 2 - PPSV23) Never done   • TDAP/TD VACCINES (1 - Tdap) Never done   • HEPATITIS C SCREENING  Never done   • PAP SMEAR  Never done   • INFLUENZA VACCINE  08/01/2021      Immunization History   Administered Date(s) Administered   • COVID-19 (PFIZER) 05/04/2021, 05/25/2021   • Influenza Quad Vaccine (Inpatient) 12/02/2020        Objective     Vitals:    11/01/21 1607   BP: 125/84   BP Location: Left arm   Patient Position: Sitting   Pulse: 103   Temp: 97.9 °F (36.6 °C)   Weight: 126 kg (278 lb)   Height: 167.6 cm (65.98\")     Body mass index is 44.89 kg/m².     Physical Exam  Constitutional:       General: She is not in acute distress.     Appearance: Normal appearance. She is obese.   HENT:      Head: Normocephalic.   Cardiovascular:      Rate and Rhythm: Normal rate and regular rhythm.   Pulmonary:      Effort: Pulmonary effort is normal.      Breath sounds: Normal breath sounds.   Musculoskeletal:         General: Normal range of motion.   Neurological:      General: No focal deficit present.      Mental Status: She is alert and oriented to person, place, and time.   Psychiatric:         " Mood and Affect: Mood normal.         Behavior: Behavior normal.           Result Review :                               Assessment and Plan      Diagnoses and all orders for this visit:    1. Screening for colon cancer (Primary)  -     Ambulatory Referral For Screening Colonoscopy    2. Class 3 severe obesity due to excess calories without serious comorbidity with body mass index (BMI) of 40.0 to 44.9 in adult (HCC)  Comments:  Will check labs , recommend metformin - she is interested in injectables that may help withweight loss - not indicated at this level               Follow Up     Return in about 3 months (around 2/1/2022).

## 2021-11-09 ENCOUNTER — TELEPHONE (OUTPATIENT)
Dept: FAMILY MEDICINE CLINIC | Age: 46
End: 2021-11-09

## 2021-11-09 DIAGNOSIS — R73.03 PREDIABETES: Primary | ICD-10-CM

## 2021-11-09 NOTE — TELEPHONE ENCOUNTER
Caller: Rosibel Baker    Relationship: Self    Best call back number: [phone]    What is the best time to reach you: ANYTIME    Who are you requesting to speak with (clinical staff, provider,  specific staff member): CLINICAL      What was the call regarding: THE PATIENT STATED PCP DISCUSSED AN ALTERNATIVE TO METFORMIN THAT MAY BE COVERED BY INSURANCE. SHE WOULD LIKE A CALL BACK WITH AN UPDATE ASAP    Do you require a callback: YES

## 2021-11-15 DIAGNOSIS — E66.01 CLASS 3 SEVERE OBESITY DUE TO EXCESS CALORIES WITHOUT SERIOUS COMORBIDITY WITH BODY MASS INDEX (BMI) OF 40.0 TO 44.9 IN ADULT (HCC): Primary | ICD-10-CM

## 2021-11-15 NOTE — TELEPHONE ENCOUNTER
We discussed possible injectables that may help with blood sugars, but not indicated for pre-diabetes.  I can send in for weight loss, and this may help with her pre-diabetes numbers.  [facility]

## 2021-11-16 RX ORDER — METFORMIN HYDROCHLORIDE 500 MG/1
500 TABLET, EXTENDED RELEASE ORAL 2 TIMES DAILY WITH MEALS
Qty: 60 TABLET | Refills: 2 | Status: SHIPPED | OUTPATIENT
Start: 2021-11-16 | End: 2022-02-25 | Stop reason: SDUPTHER

## 2021-11-16 NOTE — TELEPHONE ENCOUNTER
So saxenda is not covered by insurance pt said she will try metformin in a extended release verison please advise

## 2021-11-17 ENCOUNTER — TELEPHONE (OUTPATIENT)
Dept: FAMILY MEDICINE CLINIC | Age: 46
End: 2021-11-17

## 2021-11-17 NOTE — TELEPHONE ENCOUNTER
Hub staff attempted to follow warm transfer process and was unsuccessful     Caller: Rosibel Baker    Relationship to patient: Self    Best call back number: [phone]    Patient is needing: PATIENT STATED: RETURNING CALL FROM WILLA, REQUESTING CALL BACK ABOUT INSURANCE COVERAGE OF MEDICATION SAXSENDA

## 2021-11-29 RX ORDER — MELOXICAM 7.5 MG/1
TABLET ORAL
Qty: 30 TABLET | Refills: 5 | Status: SHIPPED | OUTPATIENT
Start: 2021-11-29 | End: 2022-06-07

## 2021-12-07 DIAGNOSIS — F34.1 DYSTHYMIC DISORDER: ICD-10-CM

## 2021-12-07 RX ORDER — DULOXETIN HYDROCHLORIDE 60 MG/1
CAPSULE, DELAYED RELEASE ORAL
Qty: 30 CAPSULE | Refills: 5 | Status: SHIPPED | OUTPATIENT
Start: 2021-12-07 | End: 2022-02-25 | Stop reason: SDUPTHER

## 2021-12-16 ENCOUNTER — OFFICE VISIT (OUTPATIENT)
Dept: ORTHOPEDIC SURGERY | Facility: CLINIC | Age: 46
End: 2021-12-16

## 2021-12-16 VITALS — BODY MASS INDEX: 44.2 KG/M2 | WEIGHT: 275 LBS | HEIGHT: 66 IN | TEMPERATURE: 98.7 F

## 2021-12-16 DIAGNOSIS — Z98.890 S/P ARTHROSCOPIC KNEE SURGERY: Primary | ICD-10-CM

## 2021-12-16 PROCEDURE — 99213 OFFICE O/P EST LOW 20 MIN: CPT | Performed by: ORTHOPAEDIC SURGERY

## 2021-12-16 NOTE — PROGRESS NOTES
"Chief Complaint  Follow-up of the Left Knee    Subjective    History of Present Illness      Rosibel Baker is a 46 y.o. female who presents to [facility] for follow-up on knee pain and follow-up on left knee arthroscopy as well.  History of Present Illness the patient had a left knee arthroscopy performed by me about 10-1/2 months ago.  She has done extremely well in the meantime.  She states that she has lost some weight which is very helpful in minimizing her knee pain and discomfort.  She is a lot more active than she was before.  The sharp stabbing pain that she had prior to the surgical partial meniscectomy has now settled down.  We have discussed with her at length about different treatment options including weight loss and eventually needing more surgical intervention.  Because she is only 46 years of age I would recommend nonoperative management for at least another 5 to 10 years before she considers partial or total knee arthroplasty.  Pain Location:  LEFT knee  Radiation: none  Quality: dull, aching  Intensity/Severity: mild   Duration: 1-2 years  Progression of symptoms: no worsening, reports improvement  Onset quality: gradual   Timing: intermittent  Aggravating Factors: going up and down stairs, kneeling, rising after sitting  Alleviating Factors: Tylenol, steroid injection (intra-articular), rest  Previous Episodes: yes  Associated Symptoms: pain, swelling, clicking/popping  ADLs Affected: ambulating, work related activities, recreational activities/sports  Previous Treatment: Tylenol and intra-articular injection       Objective   Vital Signs:   Temp 98.7 °F (37.1 °C)   Ht 167.6 cm (66\")   Wt 125 kg (275 lb)   BMI 44.39 kg/m²     Physical Exam  Physical Exam  Vitals signs and nursing note reviewed.   Constitutional:       Appearance: Normal appearance.   Pulmonary:      Effort: Pulmonary effort is normal.   Skin:     General: Skin is warm and dry.      Capillary Refill: " Capillary refill takes less than 2 seconds.   Neurological:      General: No focal deficit present.      Mental Status: He is alert and oriented to person, place, and time. Mental status is at baseline.   Psychiatric:         Mood and Affect: Mood normal.         Behavior: Behavior normal.         Thought Content: Thought content normal.         Judgment: Judgment normal.     Ortho Exam     Left knee. Patient is 10.5 month(s) post knee arthoscopy. Arthroscopic portals are clean and healing well. Mild effusion is noted. There is no evidence of a deep seated joint infection. Range of motion is 0-110 degrees of flexion. Neurovascular status is intact. There is no anterior or posterior instability. The ACL function appears to be well preserved. Gait is cautious and slightly antalgic but otherwise fairly normal. The sharp, stabbing pain that the patient had prior to the surgery has completely settled down. The calf is soft and non-tender and there is no clinical evidence of a DVT.        Result Review :   The following data was reviewed by: Roly Godoy MD on 12/16/2021:  Radiologic studies - see below for interpretation  no diagnostic testing performed this visit            Procedures           Assessment   Assessment and Plan    Diagnoses and all orders for this visit:    1. S/P arthroscopic knee surgery (Primary)          Follow Up   · Compression/brace to the knee to prevent it from buckling and giving out.  · Glucosamine, chondroitin and turmeric for cartilage health.  · Tablet meloxicam 15 mg tab 1 p.o. nightly for pain swelling and discomfort.  · Calcium and vitamin D for bone health.  · Rest, ice, compression, and elevation (RICE) therapy  · Stretching and strengthening exercises of the quads and the hamstrings.  · The patient was counseled regarding reduction of BMI including, but not limited to the following: Reduction of portion sizes, reduction of carbohydrate intake, decreased calorie count, increased  physical exercise and activity, counseling with a professional such as a registered dietitian and consultation with a bariatric surgeon for possible bariatric procedure.  Should the patient decide to proceed with a bariatric procedure this would not be considered a cosmetic procedure for this patient, but a means to improve the quality of life and to improve the musculoskeletal function.  Patient's current BMI is 44.39 and her body weight is 275 pounds and would need to lose weight which is required for elective orthopedic surgery to minimize perioperative complications.  · OTC Alternate Ibuprofen and Tylenol as needed  · Follow up in 6 month(s)  • Patient was given instructions and counseling regarding her condition or for health maintenance advice. Please see specific information pulled into the AVS if appropriate.     Roly Godoy MD   Date of Encounter: 12/16/2021       EMR Dragon/Transcription disclaimer:  Much of this encounter note is an electronic transcription/translation of spoken language to printed text. The electronic translation of spoken language may permit erroneous, or at times, nonsensical words or phrases to be inadvertently transcribed; Although I have reviewed the note for such errors, some may still exist.

## 2022-02-02 ENCOUNTER — TELEPHONE (OUTPATIENT)
Dept: ORTHOPEDIC SURGERY | Facility: CLINIC | Age: 47
End: 2022-02-02

## 2022-02-02 NOTE — TELEPHONE ENCOUNTER
Caller: MARCIO ZEE    Relationship to patient: SELF    Best call back number: [phone]    Chief complaint: LEFT KNEE PAIN    Type of visit: CORTISONE INJECTION    Requested date: ASAP    If rescheduling, when is the original appointment: N/A    Additional notes: LAST INJECTION SEPT 2021

## 2022-02-14 ENCOUNTER — TELEPHONE (OUTPATIENT)
Dept: ORTHOPEDIC SURGERY | Facility: CLINIC | Age: 47
End: 2022-02-14

## 2022-02-14 ENCOUNTER — CLINICAL SUPPORT (OUTPATIENT)
Dept: ORTHOPEDIC SURGERY | Facility: CLINIC | Age: 47
End: 2022-02-14

## 2022-02-14 VITALS — TEMPERATURE: 98.2 F | HEIGHT: 66 IN | BODY MASS INDEX: 44.2 KG/M2 | WEIGHT: 275 LBS

## 2022-02-14 DIAGNOSIS — Z98.890 S/P ARTHROSCOPIC KNEE SURGERY: ICD-10-CM

## 2022-02-14 DIAGNOSIS — M17.12 PRIMARY OSTEOARTHRITIS OF LEFT KNEE: Primary | ICD-10-CM

## 2022-02-14 PROCEDURE — 20610 DRAIN/INJ JOINT/BURSA W/O US: CPT | Performed by: PHYSICIAN ASSISTANT

## 2022-02-14 RX ORDER — PHENTERMINE HYDROCHLORIDE 37.5 MG/1
37.5 TABLET ORAL
COMMUNITY
Start: 2021-12-22 | End: 2022-05-18

## 2022-02-14 RX ORDER — CEPHALEXIN 500 MG/1
CAPSULE ORAL
COMMUNITY
Start: 2022-01-03 | End: 2022-02-25

## 2022-02-14 RX ORDER — METHYLPREDNISOLONE ACETATE 80 MG/ML
160 INJECTION, SUSPENSION INTRA-ARTICULAR; INTRALESIONAL; INTRAMUSCULAR; SOFT TISSUE
Status: COMPLETED | OUTPATIENT
Start: 2022-02-14 | End: 2022-02-14

## 2022-02-14 RX ORDER — LIDOCAINE HYDROCHLORIDE 10 MG/ML
2 INJECTION, SOLUTION EPIDURAL; INFILTRATION; INTRACAUDAL; PERINEURAL
Status: COMPLETED | OUTPATIENT
Start: 2022-02-14 | End: 2022-02-14

## 2022-02-14 RX ADMIN — LIDOCAINE HYDROCHLORIDE 2 ML: 10 INJECTION, SOLUTION EPIDURAL; INFILTRATION; INTRACAUDAL; PERINEURAL at 11:27

## 2022-02-14 RX ADMIN — METHYLPREDNISOLONE ACETATE 160 MG: 80 INJECTION, SUSPENSION INTRA-ARTICULAR; INTRALESIONAL; INTRAMUSCULAR; SOFT TISSUE at 11:27

## 2022-02-14 NOTE — TELEPHONE ENCOUNTER
I CALLED AND LEFT A VOICEMAIL CHECKING ON PATIENT SINCE SHE WAS HAVING A SMALL REACTION TO THE CORTISONE INJ SHE RECEIVED THIS MORNING

## 2022-02-14 NOTE — PROGRESS NOTES
"Chief Complaint  Follow-up and Pain of the Left Knee and Injections    Subjective    History of Present Illness      Rosibel Baker is a 47 y.o. female who presents to [facility] for is here today for injection therapy. She receives  LEFT knee injections of Depo-Medrol. Since last injection, patient notes substantial relief of symptoms.  Her last injection, given by Dr. Godoy, was approximately 4 to 5 months ago. Treatment options have been discussed and she understands and consents.       Objective   Vital Signs:   Temp 98.2 °F (36.8 °C)   Ht 167.6 cm (65.98\")   Wt 125 kg (275 lb)   BMI 44.41 kg/m²     Physical Exam  47 y.o. female is awake, alert, oriented, in no acute distress and well developed, well nourished.  Ortho Exam   LEFT knee  There is moderate joint line tenderness at the medial aspect of the knee.   Positive for crepitus throughout range of motion.   Negative for effusion.  Positive patellar grind test.   Negative Lachman test.    Negative anterior and posterior drawer.  Range of motion in extension and flexion is: 0-110 degrees.  Neurovascular status is intact.    Dorsalis pedis and posterior tibial artery pulses are palpable.    Common peroneal nerve function is well preserved.  Gait is cautious and antalgic.      Result Review :         PROCEDURE  Large Joint Arthrocentesis: L knee  Date/Time: 2/14/2022 11:27 AM  Consent given by: patient  Site marked: site marked  Timeout: Immediately prior to procedure a time out was called to verify the correct patient, procedure, equipment, support staff and site/side marked as required   Supporting Documentation  Indications: pain and joint swelling   Procedure Details  Location: knee - L knee  Preparation: Patient was prepped and draped in the usual sterile fashion  Needle size: 18 G  Approach: anteromedial  Medications administered: 160 mg methylPREDNISolone acetate 80 MG/ML; 2 mL lidocaine PF 1% 1 %  Patient tolerance: patient " tolerated the procedure well with no immediate complications           Injection site was identified by physical examination and cleaned with Betadine and alcohol swabs. Prior to needle insertion, ethyl chloride spray was used for surface anesthesia. Sterile technique was used.       Assessment   Assessment and Plan    Problem List Items Addressed This Visit        Musculoskeletal and Injuries    S/P arthroscopic knee surgery    Relevant Orders    Large Joint Arthrocentesis: L knee    Primary osteoarthritis of left knee - Primary    Relevant Orders    Large Joint Arthrocentesis: L knee          Follow Up   • Left knee Depo-Medrol injection was discussed with the patient. Discussed indication, risks, benefits, and alternatives. Verbal consent was given to proceed with the procedure.   • Injection was performed from anteromedial approach.  Patient tolerated the procedure well and no complications were encountered.  • Discussion of orthopedic goals and activities and patient/guardian expressed understanding.  • Ice, heat, rest, compression and elevation of extremity as beneficial  • nsaids and/or tylenol as beneficial  • Instructed to refrain from heavy activity/rest the extremity for the next 24-48 hours  • Discussion regarding possibility of cortisol flare and what to expect if this occurs  • Watch for signs and symptoms of infection  • Call if adverse effect from injection therapy  • Follow up as needed  • Patient was given instructions and counseling regarding her condition or for health maintenance advice. Please see specific information pulled into the AVS if appropriate.     Chivo Sullivan PA-C   Date of Encounter: 2/14/2022   Electronically signed by Chivo Sullivan PA-C, 02/14/22, 12:55 PM EST.      EMR Dragon/Transcription disclaimer:  Much of this encounter note is an electronic transcription/translation of spoken language to printed text. The electronic translation of spoken language may permit  erroneous, or at times, nonsensical words or phrases to be inadvertently transcribed; Although I have reviewed the note for such errors, some may still exist.

## 2022-02-15 ENCOUNTER — TELEPHONE (OUTPATIENT)
Dept: ORTHOPEDIC SURGERY | Facility: CLINIC | Age: 47
End: 2022-02-15

## 2022-02-15 NOTE — TELEPHONE ENCOUNTER
IF we end up doing another injection we will most certainly do a lower dose. She mentioned bleeding? I don't think she was bleeding much from the site when she was here.

## 2022-02-15 NOTE — TELEPHONE ENCOUNTER
Patient stated she missed a call yesterday.  She is calling back today to report from the reaction to the injection yesterday.      She had extreme tenderness and soreness, a lot of bleeding and pain and caused her not to be able to sleep last night.

## 2022-02-25 ENCOUNTER — OFFICE VISIT (OUTPATIENT)
Dept: FAMILY MEDICINE CLINIC | Age: 47
End: 2022-02-25

## 2022-02-25 VITALS
DIASTOLIC BLOOD PRESSURE: 89 MMHG | HEIGHT: 66 IN | WEIGHT: 279.2 LBS | OXYGEN SATURATION: 98 % | SYSTOLIC BLOOD PRESSURE: 129 MMHG | BODY MASS INDEX: 44.87 KG/M2 | HEART RATE: 97 BPM

## 2022-02-25 DIAGNOSIS — R73.03 PREDIABETES: Chronic | ICD-10-CM

## 2022-02-25 DIAGNOSIS — F34.1 DYSTHYMIC DISORDER: Primary | Chronic | ICD-10-CM

## 2022-02-25 DIAGNOSIS — E66.01 CLASS 3 SEVERE OBESITY DUE TO EXCESS CALORIES WITHOUT SERIOUS COMORBIDITY WITH BODY MASS INDEX (BMI) OF 40.0 TO 44.9 IN ADULT: ICD-10-CM

## 2022-02-25 DIAGNOSIS — Z13.6 SCREENING FOR CARDIOVASCULAR CONDITION: ICD-10-CM

## 2022-02-25 DIAGNOSIS — E03.9 HYPOTHYROIDISM, UNSPECIFIED TYPE: ICD-10-CM

## 2022-02-25 PROCEDURE — 99214 OFFICE O/P EST MOD 30 MIN: CPT | Performed by: NURSE PRACTITIONER

## 2022-02-25 RX ORDER — LEVOTHYROXINE SODIUM 0.15 MG/1
150 TABLET ORAL DAILY
Qty: 90 TABLET | Refills: 1 | Status: SHIPPED | OUTPATIENT
Start: 2022-02-25 | End: 2022-08-30

## 2022-02-25 RX ORDER — DULOXETIN HYDROCHLORIDE 60 MG/1
60 CAPSULE, DELAYED RELEASE ORAL DAILY
Qty: 90 CAPSULE | Refills: 1 | Status: SHIPPED | OUTPATIENT
Start: 2022-02-25 | End: 2022-11-29

## 2022-02-25 RX ORDER — METFORMIN HYDROCHLORIDE 500 MG/1
500 TABLET, EXTENDED RELEASE ORAL 2 TIMES DAILY WITH MEALS
Qty: 180 TABLET | Refills: 1 | Status: SHIPPED | OUTPATIENT
Start: 2022-02-25 | End: 2022-08-30

## 2022-03-04 ENCOUNTER — LAB (OUTPATIENT)
Dept: LAB | Facility: HOSPITAL | Age: 47
End: 2022-03-04

## 2022-03-04 DIAGNOSIS — R73.03 PREDIABETES: ICD-10-CM

## 2022-03-04 DIAGNOSIS — Z13.6 SCREENING FOR CARDIOVASCULAR CONDITION: ICD-10-CM

## 2022-03-04 DIAGNOSIS — E03.9 HYPOTHYROIDISM, UNSPECIFIED TYPE: ICD-10-CM

## 2022-03-04 LAB
ALBUMIN SERPL-MCNC: 4.2 G/DL (ref 3.5–5.2)
ALBUMIN/GLOB SERPL: 1.4 G/DL
ALP SERPL-CCNC: 108 U/L (ref 39–117)
ALT SERPL W P-5'-P-CCNC: 31 U/L (ref 1–33)
ANION GAP SERPL CALCULATED.3IONS-SCNC: 8.8 MMOL/L (ref 5–15)
AST SERPL-CCNC: 20 U/L (ref 1–32)
BILIRUB SERPL-MCNC: 0.4 MG/DL (ref 0–1.2)
BUN SERPL-MCNC: 12 MG/DL (ref 6–20)
BUN/CREAT SERPL: 12.9 (ref 7–25)
CALCIUM SPEC-SCNC: 10.1 MG/DL (ref 8.6–10.5)
CHLORIDE SERPL-SCNC: 104 MMOL/L (ref 98–107)
CHOLEST SERPL-MCNC: 147 MG/DL (ref 0–200)
CO2 SERPL-SCNC: 27.2 MMOL/L (ref 22–29)
CREAT SERPL-MCNC: 0.93 MG/DL (ref 0.57–1)
EGFRCR SERPLBLD CKD-EPI 2021: 76 ML/MIN/1.73
GLOBULIN UR ELPH-MCNC: 3.1 GM/DL
GLUCOSE SERPL-MCNC: 121 MG/DL (ref 65–99)
HBA1C MFR BLD: 5.6 % (ref 4.8–5.6)
HDLC SERPL-MCNC: 50 MG/DL (ref 40–60)
LDLC SERPL CALC-MCNC: 78 MG/DL (ref 0–100)
LDLC/HDLC SERPL: 1.52 {RATIO}
POTASSIUM SERPL-SCNC: 5 MMOL/L (ref 3.5–5.2)
PROT SERPL-MCNC: 7.3 G/DL (ref 6–8.5)
SODIUM SERPL-SCNC: 140 MMOL/L (ref 136–145)
TRIGL SERPL-MCNC: 106 MG/DL (ref 0–150)
TSH SERPL DL<=0.05 MIU/L-ACNC: 0.16 UIU/ML (ref 0.27–4.2)
VLDLC SERPL-MCNC: 19 MG/DL (ref 5–40)

## 2022-03-04 PROCEDURE — 80053 COMPREHEN METABOLIC PANEL: CPT

## 2022-03-04 PROCEDURE — 80061 LIPID PANEL: CPT

## 2022-03-04 PROCEDURE — 83036 HEMOGLOBIN GLYCOSYLATED A1C: CPT

## 2022-03-04 PROCEDURE — 84443 ASSAY THYROID STIM HORMONE: CPT

## 2022-03-04 PROCEDURE — 36415 COLL VENOUS BLD VENIPUNCTURE: CPT

## 2022-03-06 PROBLEM — E66.813 CLASS 3 SEVERE OBESITY DUE TO EXCESS CALORIES WITHOUT SERIOUS COMORBIDITY WITH BODY MASS INDEX (BMI) OF 40.0 TO 44.9 IN ADULT: Status: ACTIVE | Noted: 2021-07-05

## 2022-03-06 PROBLEM — R73.03 PREDIABETES: Status: ACTIVE | Noted: 2022-03-06

## 2022-03-06 PROBLEM — E66.01 CLASS 3 SEVERE OBESITY DUE TO EXCESS CALORIES WITHOUT SERIOUS COMORBIDITY WITH BODY MASS INDEX (BMI) OF 40.0 TO 44.9 IN ADULT (HCC): Status: ACTIVE | Noted: 2021-07-05

## 2022-03-06 PROBLEM — F34.1 DYSTHYMIC DISORDER: Status: ACTIVE | Noted: 2021-07-05

## 2022-05-12 ENCOUNTER — TELEPHONE (OUTPATIENT)
Dept: FAMILY MEDICINE CLINIC | Age: 47
End: 2022-05-12

## 2022-05-17 ENCOUNTER — LAB (OUTPATIENT)
Dept: LAB | Facility: HOSPITAL | Age: 47
End: 2022-05-17

## 2022-05-17 DIAGNOSIS — E03.9 HYPOTHYROIDISM, UNSPECIFIED TYPE: ICD-10-CM

## 2022-05-17 LAB
T4 FREE SERPL-MCNC: 1.61 NG/DL (ref 0.93–1.7)
TSH SERPL DL<=0.05 MIU/L-ACNC: 0.34 UIU/ML (ref 0.27–4.2)

## 2022-05-17 PROCEDURE — 36415 COLL VENOUS BLD VENIPUNCTURE: CPT

## 2022-05-17 PROCEDURE — 84443 ASSAY THYROID STIM HORMONE: CPT

## 2022-05-17 PROCEDURE — 84439 ASSAY OF FREE THYROXINE: CPT

## 2022-05-18 ENCOUNTER — OFFICE VISIT (OUTPATIENT)
Dept: FAMILY MEDICINE CLINIC | Age: 47
End: 2022-05-18

## 2022-05-18 VITALS
TEMPERATURE: 98.1 F | HEART RATE: 113 BPM | BODY MASS INDEX: 44.81 KG/M2 | SYSTOLIC BLOOD PRESSURE: 134 MMHG | HEIGHT: 66 IN | DIASTOLIC BLOOD PRESSURE: 94 MMHG | WEIGHT: 278.8 LBS

## 2022-05-18 DIAGNOSIS — H66.93 OTITIS OF BOTH EARS: Primary | ICD-10-CM

## 2022-05-18 DIAGNOSIS — R11.0 NAUSEA: ICD-10-CM

## 2022-05-18 PROCEDURE — 99212 OFFICE O/P EST SF 10 MIN: CPT | Performed by: NURSE PRACTITIONER

## 2022-05-18 RX ORDER — ONDANSETRON 4 MG/1
4 TABLET, ORALLY DISINTEGRATING ORAL EVERY 8 HOURS PRN
Qty: 15 TABLET | Refills: 1 | Status: SHIPPED | OUTPATIENT
Start: 2022-05-18 | End: 2023-03-14 | Stop reason: SDUPTHER

## 2022-05-18 RX ORDER — CEFDINIR 300 MG/1
300 CAPSULE ORAL 2 TIMES DAILY
Qty: 14 CAPSULE | Refills: 0 | Status: SHIPPED | OUTPATIENT
Start: 2022-05-18 | End: 2022-05-25

## 2022-05-18 NOTE — PROGRESS NOTES
"Chief Complaint  Generalized Body Aches (Nausea, earache, headaches 'several weeks', horrible migraine yesterday)    Subjective          Rosibel Baker presents to [facility]  Earache   There is pain in both ears. This is a recurrent problem. The current episode started 1 to 4 weeks ago. The problem has been gradually worsening. There has been no fever. Associated symptoms include headaches. She has tried NSAIDs for the symptoms. The treatment provided mild relief.       Objective   Vital Signs:  /94 (BP Location: Right arm, Patient Position: Sitting, Cuff Size: Adult)   Pulse 113   Temp 98.1 °F (36.7 °C) (Oral)   Ht 167.6 cm (65.98\")   Wt 126 kg (278 lb 12.8 oz)   BMI 45.02 kg/m²         Physical Exam  HENT:      Head: Normocephalic.      Right Ear: Decreased hearing noted. Drainage and tenderness present. A middle ear effusion is present. Tympanic membrane is injected, erythematous and bulging.      Left Ear: Decreased hearing noted. Tenderness present. A middle ear effusion is present. Tympanic membrane is injected and erythematous.      Nose: Congestion present.      Right Sinus: Maxillary sinus tenderness present. No frontal sinus tenderness.      Left Sinus: No maxillary sinus tenderness or frontal sinus tenderness.      Mouth/Throat:      Pharynx: Posterior oropharyngeal erythema present. No oropharyngeal exudate.   Cardiovascular:      Rate and Rhythm: Normal rate and regular rhythm.   Pulmonary:      Effort: Pulmonary effort is normal. No respiratory distress.      Breath sounds: Normal breath sounds. No stridor. No wheezing, rhonchi or rales.   Skin:     General: Skin is warm and dry.   Neurological:      Mental Status: She is alert and oriented to person, place, and time.   Psychiatric:         Mood and Affect: Mood normal.        Result Review :                 Assessment and Plan    Diagnoses and all orders for this visit:    1. Otitis of both ears (Primary)  -  "    cefdinir (OMNICEF) 300 MG capsule; Take 1 capsule by mouth 2 (Two) Times a Day for 7 days.  Dispense: 14 capsule; Refill: 0    2. Nausea  -     ondansetron ODT (Zofran ODT) 4 MG disintegrating tablet; Place 1 tablet on the tongue Every 8 (Eight) Hours As Needed for Nausea or Vomiting.  Dispense: 15 tablet; Refill: 1             Follow Up   Return if symptoms worsen or fail to improve.  Patient was given instructions and counseling regarding her condition or for health maintenance advice. Please see specific information pulled into the AVS if appropriate.

## 2022-05-23 ENCOUNTER — TELEPHONE (OUTPATIENT)
Dept: ORTHOPEDIC SURGERY | Facility: CLINIC | Age: 47
End: 2022-05-23

## 2022-05-23 NOTE — TELEPHONE ENCOUNTER
CALLED PATIENT AND SHE STATES YOU WOULD TO DISCUSS OPTIONS WITH REGARD TO THE SAME KNEE, NOT ANOTHER COMPLAINT.

## 2022-05-23 NOTE — TELEPHONE ENCOUNTER
PATIENT HAS AN APPT SCHEDULED FOR 6/16/22.  SHE WOULD LIKE TO GET AN INJECTION AS WELL AS DISCUSS SOME THINGS WITH DR HALE.

## 2022-06-07 RX ORDER — MELOXICAM 7.5 MG/1
TABLET ORAL
Qty: 30 TABLET | Refills: 5 | Status: SHIPPED | OUTPATIENT
Start: 2022-06-07 | End: 2023-02-27

## 2022-06-16 ENCOUNTER — OFFICE VISIT (OUTPATIENT)
Dept: ORTHOPEDIC SURGERY | Facility: CLINIC | Age: 47
End: 2022-06-16

## 2022-06-16 ENCOUNTER — OFFICE VISIT (OUTPATIENT)
Dept: FAMILY MEDICINE CLINIC | Age: 47
End: 2022-06-16

## 2022-06-16 VITALS
HEIGHT: 66 IN | OXYGEN SATURATION: 95 % | DIASTOLIC BLOOD PRESSURE: 92 MMHG | HEART RATE: 101 BPM | SYSTOLIC BLOOD PRESSURE: 144 MMHG | BODY MASS INDEX: 44.74 KG/M2 | WEIGHT: 278.4 LBS

## 2022-06-16 VITALS — BODY MASS INDEX: 28.61 KG/M2 | HEIGHT: 66 IN | WEIGHT: 178 LBS

## 2022-06-16 DIAGNOSIS — L29.9 EAR ITCHING: Primary | ICD-10-CM

## 2022-06-16 DIAGNOSIS — I83.93 SPIDER VEINS OF BOTH LOWER EXTREMITIES: ICD-10-CM

## 2022-06-16 DIAGNOSIS — M17.12 PRIMARY OSTEOARTHRITIS OF LEFT KNEE: Primary | ICD-10-CM

## 2022-06-16 DIAGNOSIS — H66.93 OTITIS OF BOTH EARS: ICD-10-CM

## 2022-06-16 DIAGNOSIS — Z98.890 S/P ARTHROSCOPIC KNEE SURGERY: ICD-10-CM

## 2022-06-16 PROCEDURE — 87147 CULTURE TYPE IMMUNOLOGIC: CPT | Performed by: NURSE PRACTITIONER

## 2022-06-16 PROCEDURE — 87186 SC STD MICRODIL/AGAR DIL: CPT | Performed by: NURSE PRACTITIONER

## 2022-06-16 PROCEDURE — 87077 CULTURE AEROBIC IDENTIFY: CPT | Performed by: NURSE PRACTITIONER

## 2022-06-16 PROCEDURE — 87070 CULTURE OTHR SPECIMN AEROBIC: CPT | Performed by: NURSE PRACTITIONER

## 2022-06-16 PROCEDURE — 87205 SMEAR GRAM STAIN: CPT | Performed by: NURSE PRACTITIONER

## 2022-06-16 PROCEDURE — 99213 OFFICE O/P EST LOW 20 MIN: CPT | Performed by: NURSE PRACTITIONER

## 2022-06-16 PROCEDURE — 99213 OFFICE O/P EST LOW 20 MIN: CPT | Performed by: ORTHOPAEDIC SURGERY

## 2022-06-16 RX ORDER — HYDROCORTISONE AND ACETIC ACID 20.75; 10.375 MG/ML; MG/ML
3 SOLUTION AURICULAR (OTIC) 2 TIMES DAILY
Qty: 10 ML | Refills: 2 | Status: SHIPPED | OUTPATIENT
Start: 2022-06-16 | End: 2022-08-12

## 2022-06-16 NOTE — PROGRESS NOTES
"Chief Complaint  Follow-up and Pain of the Left Knee    Subjective    History of Present Illness      Rosibel Baker is a 47 y.o. female who presents to [facility] for persistent left knee pain and discomfort.  History of Present Illness the patient states that he has continued to have pain and discomfort on the medial aspect of the knee.  She had arthroscopic surgery last year which did help her to some degree but subsequently back things have continued to deteriorate as far as knee function is concerned.  She continues to have a lot of pain and discomfort on the medial aspect.  She has been unsuccessful in losing any weight because she states that she cannot exercise on account of the knee pain and discomfort.  She also has issues with chronic low back pain which makes the knee symptoms worse.  We are going to try viscosupplementation injection and if that is unsuccessful in managing her symptoms then we would consider partial knee replacement for this patient to improve her quality of life.  Pain Location:  LEFT knee  Radiation: none  Quality: dull, aching  Intensity/Severity: moderate  Duration: Several months  Progression of symptoms: yes, progressive worsening  Onset quality: gradual   Timing: intermittent  Aggravating Factors: kneeling, squatting  Alleviating Factors: NSAIDs  Previous Episodes: yes  Associated Symptoms: pain, swelling  ADLs Affected: ambulating, work related activities, recreational activities/sports  Previous Treatment: NSAIDs       Objective   Vital Signs:   Ht 167.6 cm (66\")   Wt 80.7 kg (178 lb)   BMI 28.73 kg/m²     Physical Exam  Physical Exam  Vitals signs and nursing note reviewed.   Constitutional:       Appearance: Normal appearance.   Pulmonary:      Effort: Pulmonary effort is normal.   Skin:     General: Skin is warm and dry.      Capillary Refill: Capillary refill takes less than 2 seconds.   Neurological:      General: No focal deficit present.      " Mental Status: He is alert and oriented to person, place, and time. Mental status is at baseline.   Psychiatric:         Mood and Affect: Mood normal.         Behavior: Behavior normal.         Thought Content: Thought content normal.         Judgment: Judgment normal.     Ortho Exam   Left knee (cmp). The patients' patellar grind test is exquisitely postive.  A lot of pain and discomfort in the retropatellar area. The patient has high Q-angle. Range of motion is from 0-110 degrees of flexion. Anterior and posterior drawer signs are negative. No medial or lateral instability is noted. The patella tends to track laterally in high grades of flexion. A Slightly positive apprehension sign is noted. There is some tenderness over the medial patellofemoral ligaments. Skin and soft tissues are swollen; consistent with inflammatory changes of the patellofemoral joint. Dorsalis pedis and posterior tibial artery pulses are palpable. Common peroneal nerve function is well preserved. Quad mechanism is well preserved.            Result Review :   The following data was reviewed by: Roly Godoy MD on 06/16/2022:                Procedures           Assessment   Assessment and Plan    Diagnoses and all orders for this visit:    1. Primary osteoarthritis of left knee (Primary)  -     Visco Treatment; Future    2. S/P arthroscopic knee surgery          Follow Up   · Compression/brace to the knee to prevent it from buckling and giving out.  · Calcium and vitamin D for bone health.  · Glucosamine, chondroitin and turmeric for cartilage health.  · Intra-articular viscosupplementation injections discussed with the patient.  · She will eventually need partial knee arthroplasty but she is only 47 years of age and I am reluctant to offer any type of arthroplasty surgery.  · The patient was counseled regarding reduction of BMI including, but not limited to the following: Reduction of portion sizes, reduction of carbohydrate intake,  decreased calorie count, increased physical exercise and activity, counseling with a professional such as a registered dietitian and consultation with a bariatric surgeon for possible bariatric procedure.  Should the patient decide to proceed with a bariatric procedure this would not be considered a cosmetic procedure for this patient, but a means to improve the quality of life and to improve the musculoskeletal function.  Patient's current BMI is elevated and her body weight is 278 pounds and would need to lose weight which is required for elective orthopedic surgery to minimize perioperative complications.  · Rest, ice, compression, and elevation (RICE) therapy  · Stretching and strengthening exercises of the quads and hamstrings.  · OTC Tylenol 500-1000mg by mouth every 6 hours as needed for pain   · Follow up in 6 week(s)  • Patient was given instructions and counseling regarding her condition or for health maintenance advice. Please see specific information pulled into the AVS if appropriate.     Roly Godoy MD   Date of Encounter: 6/16/2022       EMR Dragon/Transcription disclaimer:  Much of this encounter note is an electronic transcription/translation of spoken language to printed text. The electronic translation of spoken language may permit erroneous, or at times, nonsensical words or phrases to be inadvertently transcribed; Although I have reviewed the note for such errors, some may still exist.

## 2022-06-16 NOTE — PROGRESS NOTES
"Chief Complaint  Follow-up and Pain of the Left Knee    Subjective    History of Present Illness {  Problem List  Visit Diagnosis   Encounters  Notes  Medications  Labs  Result Review Imaging  Media :23}     Rosibel Baker is a 47 y.o. female who presents to [facility] for {[facility] Visit Reason/injection hpi:89922::\"injection therapy.\"} She {prateek Receives Is Receivin} {AS Body Parts:99164} injections of {AS Joint Injection Medication Choices:39814}. {[facility] () HPI injection:59284} Treatment options have been discussed and she understands and consents.       Objective   Vital Signs:   Ht 167.6 cm (66\")   Wt 80.7 kg (178 lb)   BMI 28.73 kg/m²     Physical Exam  47 y.o. female is awake, alert, oriented, in no acute distress and well developed, well nourished.  Ortho Exam   {[facility] body part:68705}  {[facility] MS Exams for Injection:82540}      Result Review :{ Labs  Result Review  Imaging  Med Tab  Media :23}   {Data reviewed (optional):79267}  {Diagnostics options AS:07860}    PROCEDURE  Procedures   {[facility] Procedure Statement (Optional):95971::\"See separate procedure note\"}    Injection site was identified by physical examination and cleaned with Betadine and alcohol swabs. Prior to needle insertion, ethyl chloride spray was used for surface anesthesia. Sterile technique was used.       Assessment   Assessment and Plan {CC Problem List  Visit Diagnosis  ROS  Review (Popup)  Health Maintenance  Quality  BestPractice  Medications  SmartSets  SnapShot Encounters  Media :23}   Problem List Items Addressed This Visit    None       {Time Spent (Optional):52837}  Follow Up {Instructions Charge Capture  Follow-up Communications :23}  • {Right, left-initial cap:4508} {Body Location for PLAN ORTHO:81352} {Joint Injection Medication Choices:27696} injection was discussed with the patient. Discussed indication, risks, benefits, and alternatives. Verbal " "consent was given to proceed with the procedure.   • Injection was performed from {anteromedial, anterolateral:94633} approach.  Patient tolerated the procedure well and no complications were encountered.  • {Nate Post Injecion Advice:32503::\"Discussion of orthopedic goals and activities and patient/guardian expressed understanding.\",\"Ice, heat, rest, compression and elevation of extremity as beneficial\",\"Instructed to refrain from heavy activity/rest the extremity for the next 24-48 hours\",\"Discussion regarding possibility of cortisol flare and what to expect if this occurs\",\"Watch for signs and symptoms of infection\",\"Call if adverse effect from injection therapy\"}  • {Nate Follow Up:35502}  • Patient was given instructions and counseling regarding her condition or for health maintenance advice. Please see specific information pulled into the AVS if appropriate.     Maine Suárez MA   Date of Encounter: 6/16/2022   Electronically signed by Maine Suárez MA, 06/16/22, 4:08 PM EDT.     EMR Dragon/Transcription disclaimer:  Much of this encounter note is an electronic transcription/translation of spoken language to printed text. The electronic translation of spoken language may permit erroneous, or at times, nonsensical words or phrases to be inadvertently transcribed; Although I have reviewed the note for such errors, some may still exist.   "

## 2022-06-20 LAB
BACTERIA SPEC AEROBE CULT: ABNORMAL
GRAM STN SPEC: ABNORMAL
GRAM STN SPEC: ABNORMAL

## 2022-06-23 ENCOUNTER — TELEPHONE (OUTPATIENT)
Dept: FAMILY MEDICINE CLINIC | Age: 47
End: 2022-06-23

## 2022-06-23 DIAGNOSIS — H66.93 OTITIS OF BOTH EARS: ICD-10-CM

## 2022-06-23 RX ORDER — OFLOXACIN 3 MG/ML
5 SOLUTION AURICULAR (OTIC) DAILY
Qty: 10 ML | Refills: 0 | Status: SHIPPED | OUTPATIENT
Start: 2022-06-23 | End: 2022-06-24 | Stop reason: SDUPTHER

## 2022-06-23 NOTE — TELEPHONE ENCOUNTER
Caller: [facility] - [place], [place] - [address] [facility] [address]  - [phone] [facility] [phone] FX    Relationship to patient: Pharmacy    Best call back number: [phone]    Patient is needing: PHARMACY NEEDS CLARIFICATION ON FREQUENCY FOR PATIENTS OFLOXACIN MEDICATION.

## 2022-06-24 RX ORDER — OFLOXACIN 3 MG/ML
10 SOLUTION AURICULAR (OTIC) DAILY
Qty: 5 ML | Refills: 0 | Status: SHIPPED | OUTPATIENT
Start: 2022-06-24 | End: 2022-07-01

## 2022-06-28 ENCOUNTER — TELEPHONE (OUTPATIENT)
Dept: ORTHOPEDIC SURGERY | Facility: CLINIC | Age: 47
End: 2022-06-28

## 2022-06-28 DIAGNOSIS — M17.12 PRIMARY OSTEOARTHRITIS OF LEFT KNEE: Primary | ICD-10-CM

## 2022-06-28 NOTE — TELEPHONE ENCOUNTER
JANE WITH [facility] [phone] CALLED IN TO GET A NEW PRESCRIPTION FOR MONOVISC. PLEASE ADVISE.

## 2022-08-01 ENCOUNTER — OFFICE VISIT (OUTPATIENT)
Dept: FAMILY MEDICINE CLINIC | Age: 47
End: 2022-08-01

## 2022-08-01 VITALS
HEIGHT: 66 IN | TEMPERATURE: 98.8 F | SYSTOLIC BLOOD PRESSURE: 129 MMHG | OXYGEN SATURATION: 98 % | DIASTOLIC BLOOD PRESSURE: 95 MMHG | WEIGHT: 274 LBS | HEART RATE: 117 BPM | BODY MASS INDEX: 44.03 KG/M2

## 2022-08-01 DIAGNOSIS — R05.9 COUGH: ICD-10-CM

## 2022-08-01 DIAGNOSIS — U07.1 COVID: Primary | ICD-10-CM

## 2022-08-01 LAB
EXPIRATION DATE: ABNORMAL
FLUAV AG UPPER RESP QL IA.RAPID: NOT DETECTED
FLUBV AG UPPER RESP QL IA.RAPID: NOT DETECTED
INTERNAL CONTROL: ABNORMAL
Lab: ABNORMAL
SARS-COV-2 AG UPPER RESP QL IA.RAPID: DETECTED

## 2022-08-01 PROCEDURE — 87428 SARSCOV & INF VIR A&B AG IA: CPT | Performed by: NURSE PRACTITIONER

## 2022-08-01 PROCEDURE — 99213 OFFICE O/P EST LOW 20 MIN: CPT | Performed by: NURSE PRACTITIONER

## 2022-08-01 RX ORDER — AZITHROMYCIN 250 MG/1
TABLET, FILM COATED ORAL
Qty: 6 TABLET | Refills: 0 | Status: SHIPPED | OUTPATIENT
Start: 2022-08-01 | End: 2022-08-12

## 2022-08-01 NOTE — PROGRESS NOTES
"Chief Complaint  Vomiting, Fever, Nausea, Fatigue, Nasal Congestion (Started 7/25 ), and Cough    Subjective        Rosibel Baker presents to [facility]  History of Present Illness  Patient here today for concerns of possible covid infection or URI     Known Exposure to positive case?   no  Date of exposure?   na  Date of symptoms start?   7/25/2022  (Symptoms may appear 2-14 days after exposure )    Fever or chills?   yes  Cough?   yes  Shortness of breath or difficulty breathing?  no  Fatigue?   yes  Muscle or body aches?  yes   Headache?   yes  New loss of taste or smell? yes  Sore throat?  yes  Congestion or runny nose? yes  Nausea or vomiting?   yes  Diarrhea?   yes  Any  emergency warning signs for COVID-19.   Trouble breathing?  no  Persistent pain or pressure in the chest?   no  New confusion? no  Inability to wake or stay awake?no  Pale, gray, or blue-colored skin, lips, or nail beds, depending on skin tone?   no    Taking any medications at home to help with symptoms?no  Any prior vaccine to covid?   yes  Any significant health problems / existing lung / heart problems?  no  Interested in monoclonal antibody treatment if test result is positive? no  (must be symptomatic, have a positive PCR covid test and meet ONE of the following criteria and be at least 18 YOA)  Age 65 or older  Obesity (BMI of 25 or more)  Pregnancy  Immunosuppressed  Diabetes  CKD  Cardiovascular disease, hypertension, chronic lung disease  Sickle cell disease   Neurodevelopment disorder  Objective   Vital Signs:  /95 (BP Location: Left arm, Patient Position: Sitting)   Pulse 117   Temp 98.8 °F (37.1 °C)   Ht 167.6 cm (65.98\")   Wt 124 kg (274 lb)   SpO2 98%   BMI 44.25 kg/m²   Estimated body mass index is 44.25 kg/m² as calculated from the following:    Height as of this encounter: 167.6 cm (65.98\").    Weight as of this encounter: 124 kg (274 lb).          Physical Exam  HENT:      Head: " Normocephalic.      Nose: Congestion present.   Cardiovascular:      Rate and Rhythm: Regular rhythm. Tachycardia present.   Pulmonary:      Effort: Pulmonary effort is normal. No respiratory distress.      Breath sounds: Normal breath sounds. No stridor. No wheezing, rhonchi or rales.   Skin:     General: Skin is warm and dry.   Neurological:      Mental Status: She is alert and oriented to person, place, and time.   Psychiatric:         Mood and Affect: Mood normal.        Result Review :             Results for orders placed or performed in visit on 08/01/22   POCT SARS-CoV-2 Antigen NIRMALA + Flu    Specimen: Swab   Result Value Ref Range    SARS Antigen Detected (A) Not Detected, Presumptive Negative    Influenza A Antigen NIRMALA Not Detected Not Detected    Influenza B Antigen NIRMALA Not Detected Not Detected    Internal Control Passed Passed    Lot Number [id number]     Expiration Date 1/29/23           Assessment and Plan   Diagnoses and all orders for this visit:    1. COVID (Primary)  -     azithromycin (Zithromax Z-Derik) 250 MG tablet; Take 2 tablets by mouth on day 1, then 1 tablet daily on days 2-5  Dispense: 6 tablet; Refill: 0    2. Cough  -     POCT SARS-CoV-2 Antigen NIRMALA + Flu             Follow Up   Return if symptoms worsen or fail to improve.  Patient was given instructions and counseling regarding her condition or for health maintenance advice. Please see specific information pulled into the AVS if appropriate.

## 2022-08-12 ENCOUNTER — OFFICE VISIT (OUTPATIENT)
Dept: FAMILY MEDICINE CLINIC | Age: 47
End: 2022-08-12

## 2022-08-12 VITALS
WEIGHT: 277.4 LBS | OXYGEN SATURATION: 100 % | BODY MASS INDEX: 44.58 KG/M2 | HEART RATE: 100 BPM | TEMPERATURE: 97.8 F | DIASTOLIC BLOOD PRESSURE: 85 MMHG | HEIGHT: 66 IN | SYSTOLIC BLOOD PRESSURE: 118 MMHG

## 2022-08-12 DIAGNOSIS — R45.4 IRRITABILITY: ICD-10-CM

## 2022-08-12 DIAGNOSIS — Z11.59 SCREENING FOR VIRAL DISEASE: ICD-10-CM

## 2022-08-12 DIAGNOSIS — G89.29 CHRONIC LOW BACK PAIN, UNSPECIFIED BACK PAIN LATERALITY, UNSPECIFIED WHETHER SCIATICA PRESENT: ICD-10-CM

## 2022-08-12 DIAGNOSIS — M54.50 CHRONIC LOW BACK PAIN, UNSPECIFIED BACK PAIN LATERALITY, UNSPECIFIED WHETHER SCIATICA PRESENT: ICD-10-CM

## 2022-08-12 DIAGNOSIS — Z00.00 ROUTINE GENERAL MEDICAL EXAMINATION AT A HEALTH CARE FACILITY: Primary | ICD-10-CM

## 2022-08-12 DIAGNOSIS — E66.01 CLASS 3 SEVERE OBESITY DUE TO EXCESS CALORIES WITHOUT SERIOUS COMORBIDITY WITH BODY MASS INDEX (BMI) OF 40.0 TO 44.9 IN ADULT: ICD-10-CM

## 2022-08-12 DIAGNOSIS — M25.562 ARTHRALGIA OF BOTH KNEES: ICD-10-CM

## 2022-08-12 DIAGNOSIS — M25.561 ARTHRALGIA OF BOTH KNEES: ICD-10-CM

## 2022-08-12 PROCEDURE — 99396 PREV VISIT EST AGE 40-64: CPT | Performed by: NURSE PRACTITIONER

## 2022-08-12 RX ORDER — TIZANIDINE 2 MG/1
2 TABLET ORAL NIGHTLY PRN
Qty: 30 TABLET | Refills: 2 | Status: SHIPPED | OUTPATIENT
Start: 2022-08-12 | End: 2022-11-11

## 2022-08-12 RX ORDER — BUPROPION HYDROCHLORIDE 150 MG/1
150 TABLET ORAL DAILY
Qty: 30 TABLET | Refills: 3 | Status: SHIPPED | OUTPATIENT
Start: 2022-08-12 | End: 2022-12-21

## 2022-08-12 NOTE — PROGRESS NOTES
Assessment and Plan   Diagnoses and all orders for this visit:    1. Routine general medical examination at a health care facility (Primary)  Comments:  Diet and activity as tolerated, annual wellness visits recommended up-to-date on health maintenance recommendations other than hep C screening    2. Chronic low back pain, unspecified back pain laterality, unspecified whether sciatica present  -     tiZANidine (ZANAFLEX) 2 MG tablet; Take 1 tablet by mouth At Night As Needed for Muscle Spasms.  Dispense: 30 tablet; Refill: 2    3. Arthralgia of both knees  -     tiZANidine (ZANAFLEX) 2 MG tablet; Take 1 tablet by mouth At Night As Needed for Muscle Spasms.  Dispense: 30 tablet; Refill: 2    4. Irritability  Comments:  We will add on Wellbutrin to see if this helps and have her follow-up in 3 months sooner if no improvement or worsening  Orders:  -     buPROPion XL (Wellbutrin XL) 150 MG 24 hr tablet; Take 1 tablet by mouth Daily.  Dispense: 30 tablet; Refill: 3    5. Screening for viral disease  -     Hepatitis C antibody; Future    6. Class 3 severe obesity due to excess calories without serious comorbidity with body mass index (BMI) of 40.0 to 44.9 in adult (HCC)  Comments:  Weight loss may improve chronic conditions including low back pain, arthritis, bilateral knee pain                  Follow Up   Return in about 3 months (around 11/12/2022) for Recheck.    Chief Complaint  Rosibel Baker presents to [facility] for Annual Exam (Pt here for annual exam; pt has several other issues she would like to discuss including medication and knee issues)    Subjective          Rosibel is here today for annual exam.   Last annual exam was 1 year  Last eye exam: 1 year   FebruaryPROVIDER:  Dr. Mario  Last dental exam:   1 month    PROVIDER:   [facility]  Last menstrual period:  None   (hysterectomy)  Diet / exercise:        Patient's Body mass index is 44.8 kg/m². indicating that  "she is obese (BMI >30). Obesity-related health conditions include the following: knee pain. Obesity is unchanged.   Patient Care Team:  Patricia Osborne APRN as PCP - General (Nurse Practitioner)  Roly Godoy MD as Surgeon (Orthopedic Surgery)     Rosibel also reports that she will be having knee surgery on her left knee with Dr. Haro at some point in the near future.  She would also like a refill on her tizanidine she does help that that relaxes her knee somewhat and improve some of the other chronic pains that are associated with her knee problem.    She is also due for hepatitis C screening which we will not do today but we will add this to future labs.    Rosibel also reports that she has been having some problems with irritability and short tempered.  She is currently on duloxetine for a combo of anxiety depression and pain and feels this is not helping to keep her symptoms under better control.  She has been on Wellbutrin in the past and feels that this was somewhat effective.  She is interested in adding on this medication.  No reports of any suicidal or homicidal ideations.        Review of Systems    Objective     Vitals:    08/12/22 1254   BP: 118/85   BP Location: Left arm   Patient Position: Sitting   Cuff Size: Large Adult   Pulse: 100   Temp: 97.8 °F (36.6 °C)   TempSrc: Oral   SpO2: 100%   Weight: 126 kg (277 lb 6.4 oz)   Height: 167.6 cm (65.98\")     Body mass index is 44.8 kg/m².     Physical Exam  Constitutional:       General: She is not in acute distress.     Appearance: Normal appearance. She is obese.   HENT:      Head: Normocephalic.   Cardiovascular:      Rate and Rhythm: Normal rate and regular rhythm.   Pulmonary:      Effort: Pulmonary effort is normal.      Breath sounds: Normal breath sounds.   Musculoskeletal:      Left knee: Decreased range of motion (knee brace in place).   Neurological:      General: No focal deficit present.      Mental Status: She is alert and oriented to person, " place, and time.   Psychiatric:         Mood and Affect: Mood normal.         Behavior: Behavior normal.         Result Review                        Allergies   Allergen Reactions   • Diclofenac Hives and Anaphylaxis     SEIZURE     • Latex Hives and Rash   • Sulfa Antibiotics Hives   • Erythromycin Nausea Only      Past Medical History:   Diagnosis Date   • Anxiety and depression    • Back pain    • Disease of thyroid gland    • History of kidney stones    • Hypothyroidism    • Low back pain    • Medial meniscus tear    • Obesity    • Otitis media    • Ovarian dysfunction    • PONV (postoperative nausea and vomiting)    • Rash     LONG TERM, LEFT BREAST. CLOSED   • Seizure (HCC)    • Tobacco abuse    • Vitamin D deficiency      Current Outpatient Medications   Medication Sig Dispense Refill   • aspirin 325 MG tablet Take 1 tablet by mouth Daily. (Patient taking differently: Take 325 mg by mouth Daily As Needed.) 30 tablet 0   • buPROPion XL (Wellbutrin XL) 150 MG 24 hr tablet Take 1 tablet by mouth Daily. 30 tablet 3   • DULoxetine (CYMBALTA) 60 MG capsule Take 1 capsule by mouth Daily. 90 capsule 1   • estradiol (VIVELLE-DOT) 0.1 MG/24HR patch Place 1 patch on the skin as directed by provider 2 (Two) Times a Week.     • levothyroxine (SYNTHROID, LEVOTHROID) 150 MCG tablet Take 1 tablet by mouth Daily. 90 tablet 1   • meloxicam (MOBIC) 7.5 MG tablet TAKE ONE TABLET BY MOUTH EVERY DAY AS NEEDED 30 tablet 5   • metFORMIN ER (GLUCOPHAGE-XR) 500 MG 24 hr tablet Take 1 tablet by mouth 2 (Two) Times a Day With Meals. 180 tablet 1   • ondansetron ODT (Zofran ODT) 4 MG disintegrating tablet Place 1 tablet on the tongue Every 8 (Eight) Hours As Needed for Nausea or Vomiting. 15 tablet 1   • tiZANidine (ZANAFLEX) 2 MG tablet Take 1 tablet by mouth At Night As Needed for Muscle Spasms. 30 tablet 2     No current facility-administered medications for this visit.     Past Surgical History:   Procedure Laterality Date   •  ADENOIDECTOMY     • CHOLECYSTECTOMY     • CHOLECYSTECTOMY     • HYSTERECTOMY     • HYSTERECTOMY     • KNEE ARTHROSCOPY Left 1/29/2021    Procedure: KNEE ARTHROSCOPY, PARTIAL MEDIAL MENISECTOMY;  Surgeon: Roly Godoy MD;  Location: [facility] OR [facility];  Service: Orthopedics;  Laterality: Left;   • POLYPECTOMY     • THYROID SURGERY     • TONSILLECTOMY        Health Maintenance Due   Topic Date Due   • HEPATITIS C SCREENING  Never done      Immunization History   Administered Date(s) Administered   • COVID-19 (PFIZER) PURPLE CAP 05/04/2021, 05/25/2021, 01/11/2022   • Influenza Quad Vaccine (Inpatient) 12/02/2020   • Influenza, Unspecified 01/11/2022

## 2022-08-16 DIAGNOSIS — M17.12 PRIMARY OSTEOARTHRITIS OF LEFT KNEE: Primary | ICD-10-CM

## 2022-08-18 ENCOUNTER — OFFICE VISIT (OUTPATIENT)
Dept: ORTHOPEDIC SURGERY | Facility: CLINIC | Age: 47
End: 2022-08-18

## 2022-08-18 ENCOUNTER — HOSPITAL ENCOUNTER (OUTPATIENT)
Dept: GENERAL RADIOLOGY | Facility: HOSPITAL | Age: 47
Discharge: HOME | End: 2022-08-18
Admitting: ORTHOPAEDIC SURGERY

## 2022-08-18 VITALS — WEIGHT: 279.2 LBS | TEMPERATURE: 98.6 F | HEIGHT: 66 IN | BODY MASS INDEX: 44.87 KG/M2

## 2022-08-18 DIAGNOSIS — E66.01 OBESITY, MORBID, BMI 40.0-49.9: Primary | ICD-10-CM

## 2022-08-18 DIAGNOSIS — M17.12 PRIMARY OSTEOARTHRITIS OF LEFT KNEE: ICD-10-CM

## 2022-08-18 PROCEDURE — 73560 X-RAY EXAM OF KNEE 1 OR 2: CPT

## 2022-08-18 PROCEDURE — 20610 DRAIN/INJ JOINT/BURSA W/O US: CPT | Performed by: ORTHOPAEDIC SURGERY

## 2022-08-18 PROCEDURE — 99214 OFFICE O/P EST MOD 30 MIN: CPT | Performed by: ORTHOPAEDIC SURGERY

## 2022-08-18 RX ADMIN — TRIAMCINOLONE ACETONIDE 80 MG: 40 INJECTION, SUSPENSION INTRA-ARTICULAR; INTRAMUSCULAR at 14:55

## 2022-08-19 ENCOUNTER — TELEPHONE (OUTPATIENT)
Dept: ORTHOPEDIC SURGERY | Facility: CLINIC | Age: 47
End: 2022-08-19

## 2022-08-19 NOTE — TELEPHONE ENCOUNTER
PATIENT CALLED ASKING IF THE 30 POUND WEIGHT LOSS FOR HER IS AN ABSOLUTE REQUIREMENT FOR HER TO BE ABLE TO HAVE TOTAL KNEE REPLACEMENT AT [facility], operated by [facility] WITH DR. HALE.  I LET HER KNOW THAT THERE IS A CERTAIN BMI THAT IS REQUIRED THAT WE HAVE TO ADHERE TO.  SHE EXPRESSED CONCERN THAT SHE MAY NOT BE ABLE TO REACH THAT GOAL BY THE TIME SURGERY GETS HERE SINCE SHE IS UNABLE TO EXERCISE DUE TO HER KNEE PAIN.    PATIENT IS GOING TO OBTAIN A SECOND OPINION IN [facility] THAT WEIGHT LOSS ISN'T AN ISSUE.  SHE WILL CALL OUR OFFICE BACK IF SHE WANTS TO PROCEED WITH SCHEDULING SURGERY WITH DR. HALE

## 2022-08-19 NOTE — TELEPHONE ENCOUNTER
Thank you for being kind and patient with her.  These weight loss requirements are important in getting good outcomes after hip and knee replacement surgeries.  They also minimize complications such as wound infection, soft tissue compromise and they definitely promote longevity of the implant especially in somebody who is only 48 years of age.  I appreciate your patiently explaining to the patient.    As far as a second opinion is concerned that is obviously her choice and her prerogative and we will be glad to provide her the care if she seeks us out again.    Please make sure that she is not placed on the surgical schedule because my note will reflect that.  Please put a little Post-it note that we only proceed with scheduling her surgery after she has met the preoperative requirements and does indeed want us to provide the care.  Thank you for your diligence.

## 2022-08-29 DIAGNOSIS — R73.03 PREDIABETES: Chronic | ICD-10-CM

## 2022-08-29 DIAGNOSIS — E03.9 HYPOTHYROIDISM, UNSPECIFIED TYPE: ICD-10-CM

## 2022-08-30 RX ORDER — LEVOTHYROXINE SODIUM 0.15 MG/1
TABLET ORAL
Qty: 90 TABLET | Refills: 1 | Status: SHIPPED | OUTPATIENT
Start: 2022-08-30 | End: 2023-02-27

## 2022-08-30 RX ORDER — METFORMIN HYDROCHLORIDE 500 MG/1
TABLET, EXTENDED RELEASE ORAL
Qty: 180 TABLET | Refills: 1 | Status: SHIPPED | OUTPATIENT
Start: 2022-08-30 | End: 2023-02-27

## 2022-09-01 PROBLEM — E66.01 OBESITY, MORBID, BMI 40.0-49.9 (HCC): Status: ACTIVE | Noted: 2022-09-01

## 2022-09-01 RX ORDER — TRIAMCINOLONE ACETONIDE 40 MG/ML
80 INJECTION, SUSPENSION INTRA-ARTICULAR; INTRAMUSCULAR
Status: COMPLETED | OUTPATIENT
Start: 2022-08-18 | End: 2022-08-18

## 2022-09-14 ENCOUNTER — TELEPHONE (OUTPATIENT)
Dept: ORTHOPEDIC SURGERY | Facility: CLINIC | Age: 47
End: 2022-09-14

## 2022-09-14 NOTE — TELEPHONE ENCOUNTER
Provider: DR. HALE    Caller: MARCIO ZEE    Relationship to Patient: SELF    Phone Number: [phone]    Reason for Call: PATIENT IS REQUESTING COPIES OF HER XRAYS TAKEN ON 08/18/22. SHE STATES SHE NEEDS THEM BEFORE 09/27/22. PLEASE CALL HER WHEN THIS IS READY FOR .

## 2022-09-15 NOTE — TELEPHONE ENCOUNTER
Patient has been informed she will have to contact the diagnostic center to have those put on a disk. Patient was given the telephone number [phone]    Patient voiced understanding

## 2022-10-06 ENCOUNTER — TELEPHONE (OUTPATIENT)
Dept: FAMILY MEDICINE CLINIC | Age: 47
End: 2022-10-06

## 2022-10-13 ENCOUNTER — LAB (OUTPATIENT)
Dept: LAB | Facility: HOSPITAL | Age: 47
End: 2022-10-13

## 2022-10-13 DIAGNOSIS — Z11.59 SCREENING FOR VIRAL DISEASE: ICD-10-CM

## 2022-10-13 PROCEDURE — 36415 COLL VENOUS BLD VENIPUNCTURE: CPT

## 2022-10-13 PROCEDURE — 86803 HEPATITIS C AB TEST: CPT

## 2022-10-14 LAB — HCV AB SER DONR QL: NORMAL

## 2022-10-27 ENCOUNTER — TRANSCRIBE ORDERS (OUTPATIENT)
Dept: ADMINISTRATIVE | Facility: HOSPITAL | Age: 47
End: 2022-10-27

## 2022-10-27 DIAGNOSIS — Z01.818 OTHER SPECIFIED PRE-OPERATIVE EXAMINATION: Primary | ICD-10-CM

## 2022-11-10 DIAGNOSIS — G89.29 CHRONIC LOW BACK PAIN, UNSPECIFIED BACK PAIN LATERALITY, UNSPECIFIED WHETHER SCIATICA PRESENT: ICD-10-CM

## 2022-11-10 DIAGNOSIS — M54.50 CHRONIC LOW BACK PAIN, UNSPECIFIED BACK PAIN LATERALITY, UNSPECIFIED WHETHER SCIATICA PRESENT: ICD-10-CM

## 2022-11-10 DIAGNOSIS — M25.561 ARTHRALGIA OF BOTH KNEES: ICD-10-CM

## 2022-11-10 DIAGNOSIS — M25.562 ARTHRALGIA OF BOTH KNEES: ICD-10-CM

## 2022-11-11 RX ORDER — TIZANIDINE 2 MG/1
TABLET ORAL
Qty: 30 TABLET | Refills: 2 | Status: SHIPPED | OUTPATIENT
Start: 2022-11-11 | End: 2023-02-10

## 2022-11-29 DIAGNOSIS — F34.1 DYSTHYMIC DISORDER: Chronic | ICD-10-CM

## 2022-11-29 RX ORDER — DULOXETIN HYDROCHLORIDE 60 MG/1
CAPSULE, DELAYED RELEASE ORAL
Qty: 90 CAPSULE | Refills: 1 | Status: SHIPPED | OUTPATIENT
Start: 2022-11-29

## 2022-12-16 DIAGNOSIS — R45.4 IRRITABILITY: ICD-10-CM

## 2022-12-21 RX ORDER — BUPROPION HYDROCHLORIDE 150 MG/1
TABLET ORAL
Qty: 30 TABLET | Refills: 0 | Status: SHIPPED | OUTPATIENT
Start: 2022-12-21 | End: 2023-03-14 | Stop reason: SINTOL

## 2023-02-10 DIAGNOSIS — M54.50 CHRONIC LOW BACK PAIN, UNSPECIFIED BACK PAIN LATERALITY, UNSPECIFIED WHETHER SCIATICA PRESENT: ICD-10-CM

## 2023-02-10 DIAGNOSIS — M25.562 ARTHRALGIA OF BOTH KNEES: ICD-10-CM

## 2023-02-10 DIAGNOSIS — G89.29 CHRONIC LOW BACK PAIN, UNSPECIFIED BACK PAIN LATERALITY, UNSPECIFIED WHETHER SCIATICA PRESENT: ICD-10-CM

## 2023-02-10 DIAGNOSIS — M25.561 ARTHRALGIA OF BOTH KNEES: ICD-10-CM

## 2023-02-10 RX ORDER — TIZANIDINE 2 MG/1
TABLET ORAL
Qty: 30 TABLET | Refills: 0 | Status: SHIPPED | OUTPATIENT
Start: 2023-02-10

## 2023-02-25 DIAGNOSIS — R73.03 PREDIABETES: Chronic | ICD-10-CM

## 2023-02-25 DIAGNOSIS — E03.9 HYPOTHYROIDISM, UNSPECIFIED TYPE: ICD-10-CM

## 2023-02-27 RX ORDER — LEVOTHYROXINE SODIUM 0.15 MG/1
TABLET ORAL
Qty: 90 TABLET | Refills: 5 | Status: SHIPPED | OUTPATIENT
Start: 2023-02-27

## 2023-02-27 RX ORDER — MELOXICAM 7.5 MG/1
TABLET ORAL
Qty: 30 TABLET | Refills: 5 | Status: SHIPPED | OUTPATIENT
Start: 2023-02-27

## 2023-02-27 RX ORDER — METFORMIN HYDROCHLORIDE 500 MG/1
TABLET, EXTENDED RELEASE ORAL
Qty: 180 TABLET | Refills: 5 | Status: SHIPPED | OUTPATIENT
Start: 2023-02-27

## 2023-03-14 ENCOUNTER — OFFICE VISIT (OUTPATIENT)
Dept: FAMILY MEDICINE CLINIC | Age: 48
End: 2023-03-14
Payer: COMMERCIAL

## 2023-03-14 ENCOUNTER — LAB (OUTPATIENT)
Dept: LAB | Facility: HOSPITAL | Age: 48
End: 2023-03-14
Payer: COMMERCIAL

## 2023-03-14 VITALS
HEIGHT: 66 IN | WEIGHT: 279.6 LBS | DIASTOLIC BLOOD PRESSURE: 77 MMHG | SYSTOLIC BLOOD PRESSURE: 116 MMHG | HEART RATE: 106 BPM | TEMPERATURE: 98 F | OXYGEN SATURATION: 95 % | BODY MASS INDEX: 44.93 KG/M2

## 2023-03-14 DIAGNOSIS — R73.03 PREDIABETES: ICD-10-CM

## 2023-03-14 DIAGNOSIS — R10.13 EPIGASTRIC DISCOMFORT: ICD-10-CM

## 2023-03-14 DIAGNOSIS — D72.829 LEUKOCYTOSIS, UNSPECIFIED TYPE: ICD-10-CM

## 2023-03-14 DIAGNOSIS — Z79.1 LONG TERM (CURRENT) USE OF NON-STEROIDAL ANTI-INFLAMMATORIES (NSAID): ICD-10-CM

## 2023-03-14 DIAGNOSIS — R11.0 NAUSEA: Primary | ICD-10-CM

## 2023-03-14 DIAGNOSIS — Z13.6 SCREENING FOR CARDIOVASCULAR CONDITION: ICD-10-CM

## 2023-03-14 DIAGNOSIS — R00.0 TACHYCARDIA: ICD-10-CM

## 2023-03-14 DIAGNOSIS — R11.0 NAUSEA: ICD-10-CM

## 2023-03-14 DIAGNOSIS — E03.9 HYPOTHYROIDISM, UNSPECIFIED TYPE: ICD-10-CM

## 2023-03-14 PROBLEM — H66.90 OTITIS MEDIA: Status: RESOLVED | Noted: 2021-07-05 | Resolved: 2023-03-14

## 2023-03-14 LAB
ALBUMIN SERPL-MCNC: 3.9 G/DL (ref 3.5–5.2)
ALBUMIN/GLOB SERPL: 1.3 G/DL
ALP SERPL-CCNC: 112 U/L (ref 39–117)
ALT SERPL W P-5'-P-CCNC: 16 U/L (ref 1–33)
AMYLASE SERPL-CCNC: 62 U/L (ref 28–100)
ANION GAP SERPL CALCULATED.3IONS-SCNC: 10 MMOL/L (ref 5–15)
AST SERPL-CCNC: 20 U/L (ref 1–32)
BILIRUB SERPL-MCNC: 0.2 MG/DL (ref 0–1.2)
BUN SERPL-MCNC: 19 MG/DL (ref 6–20)
BUN/CREAT SERPL: 18.8 (ref 7–25)
CALCIUM SPEC-SCNC: 9.3 MG/DL (ref 8.6–10.5)
CHLORIDE SERPL-SCNC: 103 MMOL/L (ref 98–107)
CHOLEST SERPL-MCNC: 146 MG/DL (ref 0–200)
CO2 SERPL-SCNC: 24 MMOL/L (ref 22–29)
CREAT SERPL-MCNC: 1.01 MG/DL (ref 0.57–1)
DEPRECATED RDW RBC AUTO: 48.2 FL (ref 37–54)
EGFRCR SERPLBLD CKD-EPI 2021: 68.4 ML/MIN/1.73
ERYTHROCYTE [DISTWIDTH] IN BLOOD BY AUTOMATED COUNT: 14.6 % (ref 12.3–15.4)
GLOBULIN UR ELPH-MCNC: 2.9 GM/DL
GLUCOSE SERPL-MCNC: 120 MG/DL (ref 65–99)
HBA1C MFR BLD: 5.7 % (ref 4.8–5.6)
HCT VFR BLD AUTO: 46.1 % (ref 34–46.6)
HDLC SERPL-MCNC: 39 MG/DL (ref 40–60)
HGB BLD-MCNC: 15.1 G/DL (ref 12–15.9)
LDLC SERPL CALC-MCNC: 64 MG/DL (ref 0–100)
LDLC/HDLC SERPL: 1.35 {RATIO}
LIPASE SERPL-CCNC: 16 U/L (ref 13–60)
MCH RBC QN AUTO: 29.3 PG (ref 26.6–33)
MCHC RBC AUTO-ENTMCNC: 32.8 G/DL (ref 31.5–35.7)
MCV RBC AUTO: 89.3 FL (ref 79–97)
PLATELET # BLD AUTO: 256 10*3/MM3 (ref 140–450)
PMV BLD AUTO: 10.8 FL (ref 6–12)
POTASSIUM SERPL-SCNC: 4.7 MMOL/L (ref 3.5–5.2)
PROT SERPL-MCNC: 6.8 G/DL (ref 6–8.5)
RBC # BLD AUTO: 5.16 10*6/MM3 (ref 3.77–5.28)
SODIUM SERPL-SCNC: 137 MMOL/L (ref 136–145)
TRIGL SERPL-MCNC: 271 MG/DL (ref 0–150)
TSH SERPL DL<=0.05 MIU/L-ACNC: 0.33 UIU/ML (ref 0.27–4.2)
VLDLC SERPL-MCNC: 43 MG/DL (ref 5–40)
WBC NRBC COR # BLD: 11.2 10*3/MM3 (ref 3.4–10.8)

## 2023-03-14 PROCEDURE — 80061 LIPID PANEL: CPT

## 2023-03-14 PROCEDURE — 83690 ASSAY OF LIPASE: CPT

## 2023-03-14 PROCEDURE — 36415 COLL VENOUS BLD VENIPUNCTURE: CPT

## 2023-03-14 PROCEDURE — 93000 ELECTROCARDIOGRAM COMPLETE: CPT | Performed by: FAMILY MEDICINE

## 2023-03-14 PROCEDURE — 82150 ASSAY OF AMYLASE: CPT

## 2023-03-14 PROCEDURE — 93000 ELECTROCARDIOGRAM COMPLETE: CPT | Performed by: NURSE PRACTITIONER

## 2023-03-14 PROCEDURE — 83036 HEMOGLOBIN GLYCOSYLATED A1C: CPT

## 2023-03-14 PROCEDURE — 99214 OFFICE O/P EST MOD 30 MIN: CPT | Performed by: NURSE PRACTITIONER

## 2023-03-14 PROCEDURE — 80050 GENERAL HEALTH PANEL: CPT

## 2023-03-14 RX ORDER — DOXYCYCLINE HYCLATE 50 MG/1
50 CAPSULE ORAL 2 TIMES DAILY
COMMUNITY

## 2023-03-14 RX ORDER — SPIRONOLACTONE 25 MG/1
25 TABLET ORAL 2 TIMES DAILY
COMMUNITY

## 2023-03-14 RX ORDER — ONDANSETRON 4 MG/1
4 TABLET, ORALLY DISINTEGRATING ORAL EVERY 8 HOURS PRN
Qty: 15 TABLET | Refills: 1 | Status: SHIPPED | OUTPATIENT
Start: 2023-03-14

## 2023-03-14 RX ORDER — OMEPRAZOLE 20 MG/1
20 CAPSULE, DELAYED RELEASE ORAL DAILY
Qty: 14 CAPSULE | Refills: 0 | Status: SHIPPED | OUTPATIENT
Start: 2023-03-14 | End: 2023-03-28

## 2023-03-14 NOTE — PROGRESS NOTES
Chief Complaint  Rosibel Baker presents to [facility] for Hypothyroidism and Med Refill (Pt would like refill on zofran. )      Subjective     History of Present Illness  Rosibel presents for follow up on hypothyroidism. Denies symptoms of thyroid dysfunction at this time.  Although she is tachycardic today  Reports daily compliance with levothyroxine (Synthroid)150 mcg.  Denies any missed doses.    Last Lab Results       Component                Value               Date                       TSH                      0.343               05/17/2022    Taking metformin for prediabetes  Taking 1 tab 2 times per day no concerns  Due for A1C                Nausea has been going on for a few weeks.  If not eating, worse,  Nauzene OTC  works for 1-2 hours.  Worse in afternoon.  Will be there in morning - worse occasionally and will have to eat to improve.she does not take any PPIs as she does not report any heartburn.  She denies belching.    She does take a full strength ASA nightly x 1 year. Was previously taking NSAIDs regularly for her knee pain as well.   Has not taken any meloxicam since her knee surgery in October.  She does drink large amount of caffeine. No ETOH  Stools are not dark  No changes with her bowels.    She did have EKG with Dr. Meeks in 2018 reported as PACs        Assessment and Plan       Diagnoses and all orders for this visit:    1. Nausea (Primary)  Comments:  suspect this may be gastritis, no evidence of cardiac cause- will send for labs  and have her start on Omeprazole x 2 weeks.  F/U 3 weeks if no improvement  Orders:  -     ECG 12 Lead  -     Lipase; Future  -     Amylase; Future  -     ondansetron ODT (Zofran ODT) 4 MG disintegrating tablet; Place 1 tablet on the tongue Every 8 (Eight) Hours As Needed for Nausea or Vomiting.  Dispense: 15 tablet; Refill: 1    2. Prediabetes  Comments:  repeat labs - continue current treatment unless indicated otherwise from  labs  Orders:  -     Hemoglobin A1c; Future    3. Hypothyroidism, unspecified type  Comments:  continue current treatment unless labs indicate otherwise  Orders:  -     TSH Rfx On Abnormal To Free T4; Future    4. Epigastric discomfort  Comments:  suspect gastritis- omeprazole, stop ASA use, follow up in 3 weeks- sooner if worse/new symptoms   If resolved  follow up at next scheduled appt   Orders:  -     ECG 12 Lead    5. Tachycardia  Comments:  check TSH, consider beta blocker if persists as last 3 visit have been tachycardia  previous eval by cardiology   Orders:  -     ECG 12 Lead    6. Long term (current) use of non-steroidal anti-inflammatories (nsaid)  Comments:  stop daily ASA  Orders:  -     CBC No Differential; Future  -     omeprazole (priLOSEC) 20 MG capsule; Take 1 capsule by mouth Daily for 14 days.  Dispense: 14 capsule; Refill: 0    7. Screening for cardiovascular condition  -     Comprehensive metabolic panel; Future  -     Lipid panel; Future        Follow Up   Return for Pending lab results.      New Medications Ordered This Visit   Medications   • omeprazole (priLOSEC) 20 MG capsule     Sig: Take 1 capsule by mouth Daily for 14 days.     Dispense:  14 capsule     Refill:  0   • ondansetron ODT (Zofran ODT) 4 MG disintegrating tablet     Sig: Place 1 tablet on the tongue Every 8 (Eight) Hours As Needed for Nausea or Vomiting.     Dispense:  15 tablet     Refill:  1       Medications Discontinued During This Encounter   Medication Reason   • buPROPion XL (WELLBUTRIN XL) 150 MG 24 hr tablet Side effects   • ondansetron ODT (Zofran ODT) 4 MG disintegrating tablet Reorder            Review of Systems   HENT: Negative for congestion.    Gastrointestinal: Positive for abdominal pain (discomfot in epigastric) and nausea. Negative for abdominal distention, diarrhea and vomiting.       Objective     Vitals:    03/14/23 1259   BP: 116/77   BP Location: Left arm   Patient Position: Sitting   Cuff Size: Adult  "  Pulse: 106   Temp: 98 °F (36.7 °C)   TempSrc: Oral   SpO2: 95%   Weight: 127 kg (279 lb 9.6 oz)   Height: 166.4 cm (65.51\")     Body mass index is 45.8 kg/m².     Physical Exam  Constitutional:       General: She is not in acute distress.     Appearance: Normal appearance.   HENT:      Head: Normocephalic.   Cardiovascular:      Rate and Rhythm: Tachycardia present. Rhythm regularly irregular.   Pulmonary:      Effort: Pulmonary effort is normal.      Breath sounds: Normal breath sounds.   Abdominal:      General: Bowel sounds are decreased.      Tenderness: There is no abdominal tenderness.   Musculoskeletal:         General: Normal range of motion.   Neurological:      General: No focal deficit present.      Mental Status: She is alert and oriented to person, place, and time.   Psychiatric:         Mood and Affect: Mood normal.         Behavior: Behavior normal.            Result Review                       Allergies   Allergen Reactions   • Diclofenac Hives and Anaphylaxis     SEIZURE     • Latex Hives and Rash   • Sulfa Antibiotics Hives   • Erythromycin Nausea Only      Past Medical History:   Diagnosis Date   • Anxiety and depression    • Back pain    • Disease of thyroid gland    • History of kidney stones    • Hypothyroidism    • Low back pain    • Medial meniscus tear    • Obesity    • Otitis media    • Ovarian dysfunction    • PONV (postoperative nausea and vomiting)    • Rash     LONG TERM, LEFT BREAST. CLOSED   • Seizure (HCC)    • Tobacco abuse    • Vitamin D deficiency      Current Outpatient Medications   Medication Sig Dispense Refill   • aspirin 325 MG tablet Take 1 tablet by mouth Daily. (Patient taking differently: Take 1 tablet by mouth Daily As Needed.) 30 tablet 0   • doxycycline (VIBRAMYCIN) 50 MG capsule Take 1 capsule by mouth 2 (Two) Times a Day.     • DULoxetine (CYMBALTA) 60 MG capsule TAKE ONE CAPSULE BY MOUTH DAILY 90 capsule 1   • estradiol (VIVELLE-DOT) 0.1 MG/24HR patch Place 1 " patch on the skin as directed by provider 2 (Two) Times a Week.     • levothyroxine (SYNTHROID, LEVOTHROID) 150 MCG tablet TAKE ONE TABLET BY MOUTH DAILY 90 tablet 5   • meloxicam (MOBIC) 7.5 MG tablet TAKE ONE TABLET BY MOUTH EVERY DAY AS NEEDED 30 tablet 5   • metFORMIN ER (GLUCOPHAGE-XR) 500 MG 24 hr tablet TAKE ONE TABLET BY MOUTH TWICE DAILY WITH MEALS 180 tablet 5   • ondansetron ODT (Zofran ODT) 4 MG disintegrating tablet Place 1 tablet on the tongue Every 8 (Eight) Hours As Needed for Nausea or Vomiting. 15 tablet 1   • spironolactone (ALDACTONE) 25 MG tablet Take 1 tablet by mouth 2 (Two) Times a Day.     • tiZANidine (ZANAFLEX) 2 MG tablet TAKE ONE TABLET BY MOUTH AT NIGHT AS NEEDED FOR MUSCLE SPASM 30 tablet 0   • omeprazole (priLOSEC) 20 MG capsule Take 1 capsule by mouth Daily for 14 days. 14 capsule 0     No current facility-administered medications for this visit.     Past Surgical History:   Procedure Laterality Date   • ADENOIDECTOMY     • CHOLECYSTECTOMY     • CHOLECYSTECTOMY     • HYSTERECTOMY     • HYSTERECTOMY     • KNEE ARTHROSCOPY Left 1/29/2021    Procedure: KNEE ARTHROSCOPY, PARTIAL MEDIAL MENISECTOMY;  Surgeon: Roly Godoy MD;  [place] [facility] [place] [facility] [place]opedics;  Laterality: Left;   • POLYPECTOMY     • THYROID SURGERY     • TONSILLECTOMY        Health Maintenance Due   Topic Date Due   • Pneumococcal Vaccine 0-64 (1 - PCV) Never done   • TDAP/TD VACCINES (1 - Tdap) Never done   • COVID-19 Vaccine (4 - Booster for Pfizer series) 03/08/2022   • INFLUENZA VACCINE  08/01/2022      Immunization History   Administered Date(s) Administered   • COVID-19 (PFIZER) PURPLE CAP 05/04/2021, 05/25/2021, 01/11/2022   • FluLaval/Fluzone >6mos 01/11/2022   • Fluzone Quad >6mos (Multi-dose) 12/02/2020   • Influenza Quad Vaccine (Inpatient) 12/02/2020   • Influenza, Unspecified 10/19/2017, 01/11/2022   • flucelvax quad pfs =>4 YRS 01/11/2022           EMR Dragon/Transcription disclaimer:  This  encounter note may contain some electronic transcription/translation of spoken language to printed text. The electronic translation of spoken language may permit erroneous, or at times, nonsensical words or phrases to be inadvertently transcribed; Although I have reviewed the note for such errors, some may still exist.        Patricia Osborne, APRN

## 2023-03-14 NOTE — PROGRESS NOTES
ECG 12 Lead    Date/Time: 3/14/2023 1:38 PM  Performed by: Igor Mane MD  Authorized by: Patricia Osborne APRN   Comparison: compared with previous ECG from 6/28/2018  Similar to previous ECG  Comparison to previous ECG: Sinus tachycardia present.  No other obvious change.  Rhythm: sinus tachycardia  Ectopy: atrial premature contractions  Rate: normal  BPM: 102  Conduction: conduction normal  ST Segments: ST segments normal  T Waves: T waves normal  T flattening: III  QRS axis: normal  Other: no other findings    Clinical impression: normal ECG  Clinical impression comment: This is an essentially normal EKG.  Mild sinus tachycardia is present compared to most recent tracing.  A single premature atrial contraction is noted.  No other apparent focal finding.

## 2023-03-27 ENCOUNTER — TELEPHONE (OUTPATIENT)
Dept: FAMILY MEDICINE CLINIC | Age: 48
End: 2023-03-27
Payer: COMMERCIAL

## 2023-03-27 NOTE — TELEPHONE ENCOUNTER
----- Message from Natasha Higginbotham RN sent at 3/27/2023  8:39 AM EDT -----      ----- Message -----  From: SYSTEM  Sent: 3/27/2023   1:25 AM EDT  To: [facility] [place]

## 2023-05-27 DIAGNOSIS — F34.1 DYSTHYMIC DISORDER: Chronic | ICD-10-CM

## 2023-05-30 RX ORDER — DULOXETIN HYDROCHLORIDE 60 MG/1
CAPSULE, DELAYED RELEASE ORAL
Qty: 90 CAPSULE | Refills: 1 | Status: SHIPPED | OUTPATIENT
Start: 2023-05-30

## 2023-08-28 RX ORDER — MELOXICAM 7.5 MG/1
TABLET ORAL
Qty: 30 TABLET | Refills: 0 | Status: SHIPPED | OUTPATIENT
Start: 2023-08-28

## 2023-08-30 ENCOUNTER — LAB (OUTPATIENT)
Dept: LAB | Facility: HOSPITAL | Age: 48
End: 2023-08-30
Payer: COMMERCIAL

## 2023-08-30 ENCOUNTER — OFFICE VISIT (OUTPATIENT)
Dept: FAMILY MEDICINE CLINIC | Age: 48
End: 2023-08-30
Payer: COMMERCIAL

## 2023-08-30 VITALS
WEIGHT: 270.4 LBS | OXYGEN SATURATION: 94 % | BODY MASS INDEX: 43.46 KG/M2 | DIASTOLIC BLOOD PRESSURE: 71 MMHG | HEIGHT: 66 IN | TEMPERATURE: 97.8 F | SYSTOLIC BLOOD PRESSURE: 110 MMHG | HEART RATE: 96 BPM

## 2023-08-30 DIAGNOSIS — R53.83 OTHER FATIGUE: ICD-10-CM

## 2023-08-30 DIAGNOSIS — E03.9 HYPOTHYROIDISM, UNSPECIFIED TYPE: Primary | ICD-10-CM

## 2023-08-30 DIAGNOSIS — R73.03 PREDIABETES: Chronic | ICD-10-CM

## 2023-08-30 DIAGNOSIS — Z13.6 SCREENING FOR CARDIOVASCULAR CONDITION: ICD-10-CM

## 2023-08-30 DIAGNOSIS — E89.40 POST-HYSTERECTOMY MENOPAUSE: ICD-10-CM

## 2023-08-30 DIAGNOSIS — Z90.710 POST-HYSTERECTOMY MENOPAUSE: ICD-10-CM

## 2023-08-30 DIAGNOSIS — E03.9 HYPOTHYROIDISM, UNSPECIFIED TYPE: ICD-10-CM

## 2023-08-30 DIAGNOSIS — E66.01 CLASS 3 SEVERE OBESITY DUE TO EXCESS CALORIES WITHOUT SERIOUS COMORBIDITY WITH BODY MASS INDEX (BMI) OF 40.0 TO 44.9 IN ADULT: ICD-10-CM

## 2023-08-30 DIAGNOSIS — D72.829 LEUKOCYTOSIS, UNSPECIFIED TYPE: ICD-10-CM

## 2023-08-30 DIAGNOSIS — Z00.00 ROUTINE GENERAL MEDICAL EXAMINATION AT A HEALTH CARE FACILITY: Primary | ICD-10-CM

## 2023-08-30 DIAGNOSIS — F34.1 DYSTHYMIC DISORDER: Chronic | ICD-10-CM

## 2023-08-30 LAB
ALBUMIN SERPL-MCNC: 4.1 G/DL (ref 3.5–5.2)
ALBUMIN/GLOB SERPL: 1.4 G/DL
ALP SERPL-CCNC: 90 U/L (ref 39–117)
ALT SERPL W P-5'-P-CCNC: 14 U/L (ref 1–33)
ANION GAP SERPL CALCULATED.3IONS-SCNC: 10 MMOL/L (ref 5–15)
AST SERPL-CCNC: 16 U/L (ref 1–32)
BASOPHILS # BLD AUTO: 0.06 10*3/MM3 (ref 0–0.2)
BASOPHILS NFR BLD AUTO: 0.7 % (ref 0–1.5)
BILIRUB SERPL-MCNC: 0.3 MG/DL (ref 0–1.2)
BUN SERPL-MCNC: 15 MG/DL (ref 6–20)
BUN/CREAT SERPL: 15.6 (ref 7–25)
CALCIUM SPEC-SCNC: 10 MG/DL (ref 8.6–10.5)
CHLORIDE SERPL-SCNC: 105 MMOL/L (ref 98–107)
CHOLEST SERPL-MCNC: 168 MG/DL (ref 0–200)
CO2 SERPL-SCNC: 23 MMOL/L (ref 22–29)
CREAT SERPL-MCNC: 0.96 MG/DL (ref 0.57–1)
DEPRECATED RDW RBC AUTO: 46 FL (ref 37–54)
EGFRCR SERPLBLD CKD-EPI 2021: 72.7 ML/MIN/1.73
EOSINOPHIL # BLD AUTO: 0.49 10*3/MM3 (ref 0–0.4)
EOSINOPHIL NFR BLD AUTO: 5.3 % (ref 0.3–6.2)
ERYTHROCYTE [DISTWIDTH] IN BLOOD BY AUTOMATED COUNT: 13.5 % (ref 12.3–15.4)
GLOBULIN UR ELPH-MCNC: 2.9 GM/DL
GLUCOSE SERPL-MCNC: 115 MG/DL (ref 65–99)
HBA1C MFR BLD: 6.2 % (ref 4.8–5.6)
HCT VFR BLD AUTO: 44.9 % (ref 34–46.6)
HDLC SERPL-MCNC: 39 MG/DL (ref 40–60)
HGB BLD-MCNC: 14.7 G/DL (ref 12–15.9)
IMM GRANULOCYTES # BLD AUTO: 0.04 10*3/MM3 (ref 0–0.05)
IMM GRANULOCYTES NFR BLD AUTO: 0.4 % (ref 0–0.5)
LDLC SERPL CALC-MCNC: 96 MG/DL (ref 0–100)
LDLC/HDLC SERPL: 2.32 {RATIO}
LYMPHOCYTES # BLD AUTO: 2.11 10*3/MM3 (ref 0.7–3.1)
LYMPHOCYTES NFR BLD AUTO: 22.9 % (ref 19.6–45.3)
MCH RBC QN AUTO: 29.8 PG (ref 26.6–33)
MCHC RBC AUTO-ENTMCNC: 32.7 G/DL (ref 31.5–35.7)
MCV RBC AUTO: 90.9 FL (ref 79–97)
MONOCYTES # BLD AUTO: 0.83 10*3/MM3 (ref 0.1–0.9)
MONOCYTES NFR BLD AUTO: 9 % (ref 5–12)
NEUTROPHILS NFR BLD AUTO: 5.67 10*3/MM3 (ref 1.7–7)
NEUTROPHILS NFR BLD AUTO: 61.7 % (ref 42.7–76)
PLATELET # BLD AUTO: 239 10*3/MM3 (ref 140–450)
PMV BLD AUTO: 10.6 FL (ref 6–12)
POTASSIUM SERPL-SCNC: 5.1 MMOL/L (ref 3.5–5.2)
PROT SERPL-MCNC: 7 G/DL (ref 6–8.5)
RBC # BLD AUTO: 4.94 10*6/MM3 (ref 3.77–5.28)
SODIUM SERPL-SCNC: 138 MMOL/L (ref 136–145)
T4 FREE SERPL-MCNC: 1.32 NG/DL (ref 0.93–1.7)
TRIGL SERPL-MCNC: 193 MG/DL (ref 0–150)
TSH SERPL DL<=0.05 MIU/L-ACNC: 0.1 UIU/ML (ref 0.27–4.2)
VIT B12 BLD-MCNC: 598 PG/ML (ref 211–946)
VLDLC SERPL-MCNC: 33 MG/DL (ref 5–40)
WBC NRBC COR # BLD: 9.2 10*3/MM3 (ref 3.4–10.8)

## 2023-08-30 PROCEDURE — 36415 COLL VENOUS BLD VENIPUNCTURE: CPT

## 2023-08-30 PROCEDURE — 85025 COMPLETE CBC W/AUTO DIFF WBC: CPT

## 2023-08-30 PROCEDURE — 80061 LIPID PANEL: CPT

## 2023-08-30 PROCEDURE — 82607 VITAMIN B-12: CPT

## 2023-08-30 PROCEDURE — 84439 ASSAY OF FREE THYROXINE: CPT

## 2023-08-30 PROCEDURE — 80053 COMPREHEN METABOLIC PANEL: CPT

## 2023-08-30 PROCEDURE — 83036 HEMOGLOBIN GLYCOSYLATED A1C: CPT

## 2023-08-30 PROCEDURE — 84443 ASSAY THYROID STIM HORMONE: CPT

## 2023-08-30 RX ORDER — LEVOTHYROXINE SODIUM 0.15 MG/1
150 TABLET ORAL DAILY
Qty: 90 TABLET | Refills: 1 | Status: SHIPPED | OUTPATIENT
Start: 2023-08-30 | End: 2023-08-30

## 2023-08-30 RX ORDER — DULOXETIN HYDROCHLORIDE 60 MG/1
60 CAPSULE, DELAYED RELEASE ORAL DAILY
Qty: 90 CAPSULE | Refills: 1 | Status: SHIPPED | OUTPATIENT
Start: 2023-08-30

## 2023-08-30 RX ORDER — LEVOTHYROXINE SODIUM 137 UG/1
137 TABLET ORAL DAILY
Qty: 90 TABLET | Refills: 1 | Status: SHIPPED | OUTPATIENT
Start: 2023-08-30

## 2023-08-30 RX ORDER — SPIRONOLACTONE 100 MG/1
100 TABLET, FILM COATED ORAL 2 TIMES DAILY
COMMUNITY
Start: 2023-08-28

## 2023-08-30 RX ORDER — METFORMIN HYDROCHLORIDE 500 MG/1
500 TABLET, EXTENDED RELEASE ORAL 2 TIMES DAILY WITH MEALS
Qty: 180 TABLET | Refills: 1 | Status: SHIPPED | OUTPATIENT
Start: 2023-08-30

## 2023-08-30 RX ORDER — DOXYCYCLINE 100 MG/1
100 CAPSULE ORAL 2 TIMES DAILY
COMMUNITY
Start: 2023-08-28 | End: 2023-08-30

## 2023-08-30 NOTE — PROGRESS NOTES
Chief Complaint  Rosibel Baker presents to [facility] for Annual Exam and Menopause (Menopause Sx worsening, Pt requesting referral to endocrinologist )      Subjective     History of Present Illness  Rosibel is here today for annual exam.   Last annual exam was 1 year  Last eye exam: 1 month  PROVIDER: [facility]   Last dental exam:   1 week    PROVIDER:  [facility]  Last menstrual period:  hysterectomy  Diet / exercise:   No special diet or specific exercise program  Patient's Body mass index is 44.3 kg/mý. indicating that she is morbidly obese (BMI > 40 or > 35 with obesity - related health condition).  Satisfied with weight?  No  requesting referral to endocrinology    Patient Care Team:  Patricia Osborne APRN as PCP - General (Nurse Practitioner)  Roly Godoy MD as Surgeon (Orthopedic Surgery)       She is also wanting to have a referral to endocrinology for discussion for menopausal symptoms.  She is on estradiol since her hysterectomy which she reports controls her hot flashes but she is concerned with the weight gain, fatigue, mood    Rosibel presents for follow up on hypothyroidism. Denies symptoms of thyroid dysfunction at this time.    Reports daily compliance with levothyroxine (Levothroid) 150mcg.  Denies any missed doses.    Last Lab Results       Component                Value               Date                       TSH                      0.326               03/14/2023                  Assessment and Plan       Diagnoses and all orders for this visit:    1. Routine general medical examination at a health care facility (Primary)  Comments:  Well balanced diet and exercise, health maintenance recommendations discussed, annual wellness recommended    2. Post-hysterectomy menopause  -     Ambulatory Referral to Endocrinology    3. Class 3 severe obesity due to excess calories without serious comorbidity with body mass index (BMI) of 40.0 to 44.9 in  adult  Comments:  calorie counting, lower carb intake- increase activity  Orders:  -     Ambulatory Referral to Endocrinology    4. Dysthymic disorder  Comments:  continue current treatment  follow up in 6 months   Orders:  -     DULoxetine (CYMBALTA) 60 MG capsule; Take 1 capsule by mouth Daily.  Dispense: 90 capsule; Refill: 1    5. Hypothyroidism, unspecified type  Comments:  continue current treatment  follow up based on lab results   Orders:  -     levothyroxine (SYNTHROID, LEVOTHROID) 150 MCG tablet; Take 1 tablet by mouth Daily.  Dispense: 90 tablet; Refill: 1  -     TSH Rfx On Abnormal To Free T4; Future    6. Prediabetes  Comments:  continue current treatment   follow up in 6 months   Orders:  -     metFORMIN ER (GLUCOPHAGE-XR) 500 MG 24 hr tablet; Take 1 tablet by mouth 2 (Two) Times a Day With Meals.  Dispense: 180 tablet; Refill: 1  -     Hemoglobin A1c; Future    7. Other fatigue  Comments:  discussed need for sleep study for evaluation of sleep apnea/ labs and will have her follwo up with endocrinology  Orders:  -     CBC No Differential; Future  -     Vitamin B12; Future    8. Screening for cardiovascular condition  -     Lipid panel; Future  -     Comprehensive metabolic panel; Future  -     CBC No Differential; Future        Follow Up   No follow-ups on file.      New Medications Ordered This Visit   Medications    DULoxetine (CYMBALTA) 60 MG capsule     Sig: Take 1 capsule by mouth Daily.     Dispense:  90 capsule     Refill:  1    levothyroxine (SYNTHROID, LEVOTHROID) 150 MCG tablet     Sig: Take 1 tablet by mouth Daily.     Dispense:  90 tablet     Refill:  1    metFORMIN ER (GLUCOPHAGE-XR) 500 MG 24 hr tablet     Sig: Take 1 tablet by mouth 2 (Two) Times a Day With Meals.     Dispense:  180 tablet     Refill:  1       Medications Discontinued During This Encounter   Medication Reason    aspirin 325 MG tablet *Therapy completed    doxycycline (MONODOX) 100 MG capsule *Therapy completed     "doxycycline (VIBRAMYCIN) 50 MG capsule *Therapy completed    ondansetron ODT (Zofran ODT) 4 MG disintegrating tablet *Therapy completed    spironolactone (ALDACTONE) 25 MG tablet Duplicate order    metFORMIN ER (GLUCOPHAGE-XR) 500 MG 24 hr tablet Reorder    levothyroxine (SYNTHROID, LEVOTHROID) 150 MCG tablet Reorder    DULoxetine (CYMBALTA) 60 MG capsule Reorder            Review of Systems   Constitutional:  Positive for fatigue and unexpected weight gain.   HENT:  Positive for congestion and ear pain (acute during seasonal). Negative for sinus pressure.    Eyes:  Positive for discharge and visual disturbance.   Respiratory:  Negative for cough and shortness of breath.         Snoring   Cardiovascular:  Negative for chest pain and leg swelling.   Gastrointestinal:  Positive for abdominal pain (epigastric discomfort - relieved with PPI) and indigestion (acid reflux- zantac / tums).   Endocrine: Positive for heat intolerance (when patches not changed on time).   Genitourinary:  Positive for decreased libido (seeking hormone replacment). Negative for dysuria and frequency.   Musculoskeletal:  Positive for arthralgias (diffuse joint pain) and back pain (controlled with OTC).   Allergic/Immunologic: Positive for environmental allergies (cleared with loratadine).   Neurological:  Positive for headache (controlled with excedrin). Negative for dizziness.   Psychiatric/Behavioral:  Positive for agitation (irritable - for a year or more). Negative for sleep disturbance.      Objective     Vitals:    08/30/23 0822   BP: 110/71   BP Location: Right arm   Patient Position: Sitting   Cuff Size: Adult   Pulse: 96   Temp: 97.8 øF (36.6 øC)   TempSrc: Oral   SpO2: 94%   Weight: 123 kg (270 lb 6.4 oz)   Height: 166.4 cm (65.51\")     Body mass index is 44.3 kg/mý.   Class 3 Severe Obesity (BMI >=40). Obesity-related health conditions include the following:  prediabetes . Obesity is improving with lifestyle modifications. BMI is is " above average; BMI management plan is completed. We discussed low calorie, low carb based diet program, portion control, and increasing exercise.       Physical Exam  Vitals reviewed.   Constitutional:       General: She is not in acute distress.     Appearance: Normal appearance. She is well-developed. She is obese. She is not ill-appearing.   HENT:      Head: Normocephalic and atraumatic.      Right Ear: Tympanic membrane, ear canal and external ear normal.      Left Ear: Tympanic membrane, ear canal and external ear normal.      Mouth/Throat:      Lips: Pink.      Mouth: Mucous membranes are moist.      Pharynx: Oropharynx is clear. Uvula midline. No oropharyngeal exudate.   Eyes:      Extraocular Movements: Extraocular movements intact.      Conjunctiva/sclera: Conjunctivae normal.      Pupils: Pupils are equal, round, and reactive to light.   Neck:      Thyroid: No thyromegaly.   Cardiovascular:      Rate and Rhythm: Normal rate and regular rhythm.      Heart sounds: No murmur heard.    No friction rub. No gallop.   Pulmonary:      Effort: Pulmonary effort is normal.      Breath sounds: Normal breath sounds. No wheezing or rhonchi.   Abdominal:      General: Bowel sounds are normal. There is no distension.      Palpations: Abdomen is soft.      Tenderness: There is no abdominal tenderness.   Musculoskeletal:         General: Normal range of motion.      Cervical back: Normal range of motion.   Lymphadenopathy:      Head:      Right side of head: No submandibular adenopathy.      Left side of head: No submandibular adenopathy.      Cervical: No cervical adenopathy.   Skin:     General: Skin is warm and dry.      Findings: No lesion or rash.   Neurological:      General: No focal deficit present.      Mental Status: She is alert and oriented to person, place, and time.      Cranial Nerves: No cranial nerve deficit.      Gait: Gait is intact.   Psychiatric:         Mood and Affect: Mood and affect normal.          Behavior: Behavior normal.         Thought Content: Thought content normal.         Judgment: Judgment normal.          Result Review                       Allergies   Allergen Reactions    Diclofenac Hives and Anaphylaxis     SEIZURE      Latex Hives and Rash    Sulfa Antibiotics Hives    Erythromycin Nausea Only      Past Medical History:   Diagnosis Date    Anxiety and depression     Back pain     Disease of thyroid gland     History of kidney stones     Hypothyroidism     Low back pain     Medial meniscus tear     Obesity     Otitis media     Ovarian dysfunction     PONV (postoperative nausea and vomiting)     Rash     LONG TERM, LEFT BREAST. CLOSED    Seizure     Tobacco abuse     Vitamin D deficiency      Current Outpatient Medications   Medication Sig Dispense Refill    DULoxetine (CYMBALTA) 60 MG capsule Take 1 capsule by mouth Daily. 90 capsule 1    estradiol (VIVELLE-DOT) 0.1 MG/24HR patch Place 1 patch on the skin as directed by provider 2 (Two) Times a Week.      levothyroxine (SYNTHROID, LEVOTHROID) 150 MCG tablet Take 1 tablet by mouth Daily. 90 tablet 1    meloxicam (MOBIC) 7.5 MG tablet TAKE ONE TABLET BY MOUTH EVERY DAY AS NEEDED 30 tablet 0    metFORMIN ER (GLUCOPHAGE-XR) 500 MG 24 hr tablet Take 1 tablet by mouth 2 (Two) Times a Day With Meals. 180 tablet 1    spironolactone (ALDACTONE) 100 MG tablet Take 1 tablet by mouth 2 (Two) Times a Day.      tiZANidine (ZANAFLEX) 2 MG tablet TAKE ONE TABLET BY MOUTH AT NIGHT AS NEEDED FOR MUSCLE SPASM 30 tablet 0     No current facility-administered medications for this visit.     Past Surgical History:   Procedure Laterality Date    ADENOIDECTOMY      CHOLECYSTECTOMY      CHOLECYSTECTOMY      HYSTERECTOMY      HYSTERECTOMY      KNEE ARTHROSCOPY Left 1/29/2021    Procedure: KNEE ARTHROSCOPY, PARTIAL MEDIAL MENISECTOMY;  Surgeon: Roly Godoy MD;  [place] [facility] [place] [facility] [place]  Service: Orthopedics;  Laterality: Left;    POLYPECTOMY      THYROID SURGERY       TONSILLECTOMY        There are no preventive care reminders to display for this patient.     Immunization History   Administered Date(s) Administered    COVID-19 (PFIZER) Purple Cap Monovalent 05/04/2021, 05/25/2021, 01/11/2022    Fluzone >6mos 01/11/2022    Fluzone Quad >6mos (Multi-dose) 12/02/2020    Influenza Injectable Mdck Pf Quad 01/11/2022    Influenza Quad Vaccine (Inpatient) 12/02/2020    Influenza, Unspecified 10/19/2017, 01/11/2022         Part of this note may be an electronic transcription/translation of spoken language to printed   text using the Dragon Dictation System.      Patricia Osborne, APRN

## 2023-09-28 RX ORDER — MELOXICAM 7.5 MG/1
TABLET ORAL
Qty: 30 TABLET | Refills: 0 | Status: SHIPPED | OUTPATIENT
Start: 2023-09-28

## 2023-11-22 RX ORDER — MELOXICAM 7.5 MG/1
TABLET ORAL
Qty: 30 TABLET | Refills: 0 | Status: SHIPPED | OUTPATIENT
Start: 2023-11-22

## 2023-12-19 RX ORDER — MELOXICAM 7.5 MG/1
TABLET ORAL
Qty: 30 TABLET | Refills: 0 | Status: SHIPPED | OUTPATIENT
Start: 2023-12-19

## 2024-02-21 RX ORDER — MELOXICAM 7.5 MG/1
TABLET ORAL
Qty: 30 TABLET | Refills: 0 | Status: SHIPPED | OUTPATIENT
Start: 2024-02-21 | End: 2024-02-23

## 2024-02-23 ENCOUNTER — LAB (OUTPATIENT)
Dept: LAB | Facility: HOSPITAL | Age: 49
End: 2024-02-23
Payer: COMMERCIAL

## 2024-02-23 ENCOUNTER — OFFICE VISIT (OUTPATIENT)
Dept: FAMILY MEDICINE CLINIC | Age: 49
End: 2024-02-23
Payer: COMMERCIAL

## 2024-02-23 VITALS
SYSTOLIC BLOOD PRESSURE: 125 MMHG | TEMPERATURE: 98.2 F | DIASTOLIC BLOOD PRESSURE: 89 MMHG | BODY MASS INDEX: 42.81 KG/M2 | WEIGHT: 266.4 LBS | OXYGEN SATURATION: 99 % | HEIGHT: 66 IN | HEART RATE: 111 BPM

## 2024-02-23 DIAGNOSIS — Z13.6 SCREENING FOR CARDIOVASCULAR CONDITION: ICD-10-CM

## 2024-02-23 DIAGNOSIS — Z23 ENCOUNTER FOR IMMUNIZATION: Primary | ICD-10-CM

## 2024-02-23 DIAGNOSIS — R73.03 PREDIABETES: ICD-10-CM

## 2024-02-23 DIAGNOSIS — E03.9 HYPOTHYROIDISM, UNSPECIFIED TYPE: ICD-10-CM

## 2024-02-23 DIAGNOSIS — Z79.890 CURRENT LONG-TERM USE OF POSTMENOPAUSAL HORMONE REPLACEMENT THERAPY: ICD-10-CM

## 2024-02-23 LAB
ALBUMIN SERPL-MCNC: 4.2 G/DL (ref 3.5–5.2)
ALBUMIN/GLOB SERPL: 1.4 G/DL
ALP SERPL-CCNC: 97 U/L (ref 39–117)
ALT SERPL W P-5'-P-CCNC: 15 U/L (ref 1–33)
ANION GAP SERPL CALCULATED.3IONS-SCNC: 11.9 MMOL/L (ref 5–15)
AST SERPL-CCNC: 19 U/L (ref 1–32)
BILIRUB SERPL-MCNC: 0.3 MG/DL (ref 0–1.2)
BUN SERPL-MCNC: 14 MG/DL (ref 6–20)
BUN/CREAT SERPL: 14.4 (ref 7–25)
CALCIUM SPEC-SCNC: 9.9 MG/DL (ref 8.6–10.5)
CHLORIDE SERPL-SCNC: 104 MMOL/L (ref 98–107)
CO2 SERPL-SCNC: 23.1 MMOL/L (ref 22–29)
CREAT SERPL-MCNC: 0.97 MG/DL (ref 0.57–1)
EGFRCR SERPLBLD CKD-EPI 2021: 71.3 ML/MIN/1.73
GLOBULIN UR ELPH-MCNC: 2.9 GM/DL
GLUCOSE SERPL-MCNC: 95 MG/DL (ref 65–99)
HBA1C MFR BLD: 6.2 % (ref 4.8–5.6)
POTASSIUM SERPL-SCNC: 5 MMOL/L (ref 3.5–5.2)
PROT SERPL-MCNC: 7.1 G/DL (ref 6–8.5)
SODIUM SERPL-SCNC: 139 MMOL/L (ref 136–145)
TSH SERPL DL<=0.05 MIU/L-ACNC: 0.67 UIU/ML (ref 0.27–4.2)

## 2024-02-23 PROCEDURE — 84443 ASSAY THYROID STIM HORMONE: CPT

## 2024-02-23 PROCEDURE — 80053 COMPREHEN METABOLIC PANEL: CPT

## 2024-02-23 PROCEDURE — 83036 HEMOGLOBIN GLYCOSYLATED A1C: CPT

## 2024-02-23 PROCEDURE — 36415 COLL VENOUS BLD VENIPUNCTURE: CPT

## 2024-02-23 NOTE — PROGRESS NOTES
Chief Complaint  Rosibel Baker presents to [facility] for Hypothyroidism (6 mo. F/U ), Dysthymic disorder, and Med Refill (Pt wanting PCP to take over estradol Rx /Needing refill on Zanaflex )      Subjective     History of Present Illness    Rosibel presents for follow up on hypothyroidism. Denies symptoms of thyroid dysfunction at this time.    Reports daily compliance with levothyroxine (Synthroid) 137 mcg.  Denies any missed doses.    Last   Lab Results   Component Value Date    TSH 0.673 02/23/2024       Wanting to continue estrogen.  Wanting me to fill due to GYN retiring.  Has been on for some time.      Taking metformin for prediabetes  no problems to report    Regarding depression/anxiety, taking duloxetine 60 mg doing well with no concerns.      Regarding chronic back pain, taking zanaflex PRN takes sparingly         Assessment and Plan       Diagnoses and all orders for this visit:    1. Encounter for immunization (Primary)  -     Fluzone >6 Months ([id number])    2. Hypothyroidism, unspecified type  Comments:  repeat labs  continue current treatment pending results  Orders:  -     TSH Rfx On Abnormal To Free T4; Future    3. Prediabetes  Comments:  continue current treatment ongoing monitoring  Orders:  -     Hemoglobin A1c; Future    4. Screening for cardiovascular condition  -     Comprehensive metabolic panel; Future    5. Current long-term use of postmenopausal hormone replacement therapy  Comments:  will need to follow up with GYN for management and ongoing Rx            Follow Up   Return in about 6 months (around 8/23/2024) for Recheck.  Future Appointments   Date Time Provider Department Center   9/6/2024  3:45 PM Patricia Osborne APRN [facility] [place] [facility]       No orders of the defined types were placed in this encounter.      Medications Discontinued During This Encounter   Medication Reason    meloxicam (MOBIC) 7.5 MG tablet *Therapy completed          Review of  "Systems    Objective     Vitals:    02/23/24 1540   BP: 125/89   BP Location: Right arm   Patient Position: Sitting   Cuff Size: Adult   Pulse: 111   Temp: 98.2 °F (36.8 °C)   TempSrc: Oral   SpO2: 99%   Weight: 121 kg (266 lb 6.4 oz)   Height: 166.4 cm (65.51\")     Body mass index is 43.64 kg/m².          Physical Exam  Constitutional:       General: She is not in acute distress.     Appearance: Normal appearance. She is obese.   HENT:      Head: Normocephalic.   Cardiovascular:      Rate and Rhythm: Normal rate and regular rhythm.   Pulmonary:      Effort: Pulmonary effort is normal.      Breath sounds: Normal breath sounds.   Musculoskeletal:         General: Normal range of motion.   Neurological:      General: No focal deficit present.      Mental Status: She is alert and oriented to person, place, and time.   Psychiatric:         Mood and Affect: Mood normal.         Behavior: Behavior normal.            Result Review               Allergies   Allergen Reactions    Diclofenac Hives and Anaphylaxis     SEIZURE      Latex Hives and Rash    Sulfa Antibiotics Hives    Erythromycin Nausea Only      Past Medical History:   Diagnosis Date    Anxiety and depression     Back pain     Disease of thyroid gland     History of kidney stones     Hypothyroidism     Low back pain     Medial meniscus tear     Obesity     Otitis media     Ovarian dysfunction     PONV (postoperative nausea and vomiting)     Rash     LONG TERM, LEFT BREAST. CLOSED    Seizure     Tobacco abuse     Vitamin D deficiency      Current Outpatient Medications   Medication Sig Dispense Refill    DULoxetine (CYMBALTA) 60 MG capsule Take 1 capsule by mouth Daily. 90 capsule 1    levothyroxine (SYNTHROID, LEVOTHROID) 137 MCG tablet Take 1 tablet by mouth Daily. 90 tablet 1    metFORMIN ER (GLUCOPHAGE-XR) 500 MG 24 hr tablet Take 1 tablet by mouth 2 (Two) Times a Day With Meals. 180 tablet 1    spironolactone (ALDACTONE) 100 MG tablet Take 1 tablet by mouth " 2 (Two) Times a Day.      estradiol (VIVELLE-DOT) 0.1 MG/24HR patch Place 1 patch on the skin as directed by provider 2 (Two) Times a Week. 24 each 3    tiZANidine (ZANAFLEX) 2 MG tablet Take 1 tablet by mouth Every 8 (Eight) Hours As Needed for Muscle Spasms. 30 tablet 0     No current facility-administered medications for this visit.     Past Surgical History:   Procedure Laterality Date    ADENOIDECTOMY      CHOLECYSTECTOMY      CHOLECYSTECTOMY      HYSTERECTOMY      HYSTERECTOMY      KNEE ARTHROSCOPY Left 1/29/2021    Procedure: KNEE ARTHROSCOPY, PARTIAL MEDIAL MENISECTOMY;  Surgeon: Roly Godoy MD;  [place] [facility] [place] [facility] [place] [facility] [place]: [place]opedics;  Laterality: Left;    POLYPECTOMY      THYROID SURGERY      TONSILLECTOMY        Health Maintenance Due   Topic Date Due    TDAP/TD VACCINES (1 - Tdap) Never done    ZOSTER VACCINE (1 of 2) Never done    LUNG CANCER SCREENING  Never done      Immunization History   Administered Date(s) Administered    COVID-19 (PFIZER) Purple Cap Monovalent 05/04/2021, 05/25/2021, 01/11/2022    Fluzone (or Fluarix & Flulaval for VFC) >6mos 01/11/2022, 02/23/2024    Fluzone Quad >6mos (Multi-dose) 12/02/2020    Influenza Injectable Mdck Pf Quad 01/11/2022    Influenza Quad Vaccine (Inpatient) 12/02/2020    Influenza, Unspecified 10/19/2017, 01/11/2022         Part of this note may be an electronic transcription/translation of spoken language to printed   text using the Dragon Dictation System.      DAVID Tsang

## 2024-02-27 DIAGNOSIS — G89.29 CHRONIC LOW BACK PAIN, UNSPECIFIED BACK PAIN LATERALITY, UNSPECIFIED WHETHER SCIATICA PRESENT: ICD-10-CM

## 2024-02-27 DIAGNOSIS — M25.561 ARTHRALGIA OF BOTH KNEES: ICD-10-CM

## 2024-02-27 DIAGNOSIS — M54.50 CHRONIC LOW BACK PAIN, UNSPECIFIED BACK PAIN LATERALITY, UNSPECIFIED WHETHER SCIATICA PRESENT: ICD-10-CM

## 2024-02-27 DIAGNOSIS — M25.562 ARTHRALGIA OF BOTH KNEES: ICD-10-CM

## 2024-02-27 RX ORDER — ESTRADIOL 0.1 MG/D
1 FILM, EXTENDED RELEASE TRANSDERMAL 2 TIMES WEEKLY
Qty: 24 EACH | Refills: 3 | Status: SHIPPED | OUTPATIENT
Start: 2024-02-29

## 2024-02-27 RX ORDER — TIZANIDINE 2 MG/1
2 TABLET ORAL EVERY 8 HOURS PRN
Qty: 30 TABLET | Refills: 0 | Status: SHIPPED | OUTPATIENT
Start: 2024-02-27

## 2024-03-08 DIAGNOSIS — F34.1 DYSTHYMIC DISORDER: Chronic | ICD-10-CM

## 2024-03-08 RX ORDER — DULOXETIN HYDROCHLORIDE 60 MG/1
60 CAPSULE, DELAYED RELEASE ORAL DAILY
Qty: 90 CAPSULE | Refills: 0 | Status: SHIPPED | OUTPATIENT
Start: 2024-03-08

## 2024-03-22 RX ORDER — MELOXICAM 7.5 MG/1
TABLET ORAL
Qty: 30 TABLET | Refills: 0 | OUTPATIENT
Start: 2024-03-22

## 2024-03-25 RX ORDER — MELOXICAM 7.5 MG/1
TABLET ORAL
Qty: 30 TABLET | Refills: 0 | OUTPATIENT
Start: 2024-03-25

## 2024-04-20 DIAGNOSIS — E03.9 HYPOTHYROIDISM, UNSPECIFIED TYPE: ICD-10-CM

## 2024-04-22 RX ORDER — LEVOTHYROXINE SODIUM 137 UG/1
137 TABLET ORAL DAILY
Qty: 90 TABLET | Refills: 1 | Status: SHIPPED | OUTPATIENT
Start: 2024-04-22

## 2024-05-07 DIAGNOSIS — M25.562 ARTHRALGIA OF BOTH KNEES: ICD-10-CM

## 2024-05-07 DIAGNOSIS — G89.29 CHRONIC LOW BACK PAIN, UNSPECIFIED BACK PAIN LATERALITY, UNSPECIFIED WHETHER SCIATICA PRESENT: ICD-10-CM

## 2024-05-07 DIAGNOSIS — M54.50 CHRONIC LOW BACK PAIN, UNSPECIFIED BACK PAIN LATERALITY, UNSPECIFIED WHETHER SCIATICA PRESENT: ICD-10-CM

## 2024-05-07 DIAGNOSIS — M25.561 ARTHRALGIA OF BOTH KNEES: ICD-10-CM

## 2024-05-09 RX ORDER — TIZANIDINE 2 MG/1
2 TABLET ORAL EVERY 8 HOURS PRN
Qty: 30 TABLET | Refills: 0 | Status: SHIPPED | OUTPATIENT
Start: 2024-05-09

## 2024-06-28 RX ORDER — SPIRONOLACTONE 100 MG/1
100 TABLET, FILM COATED ORAL 2 TIMES DAILY
OUTPATIENT
Start: 2024-06-28

## 2024-07-01 DIAGNOSIS — L73.2 HIDRADENITIS SUPPURATIVA: ICD-10-CM

## 2024-07-03 RX ORDER — SPIRONOLACTONE 100 MG/1
100 TABLET, FILM COATED ORAL 2 TIMES DAILY
Qty: 180 TABLET | Refills: 1 | Status: SHIPPED | OUTPATIENT
Start: 2024-07-03

## 2024-07-15 DIAGNOSIS — G89.29 CHRONIC LOW BACK PAIN, UNSPECIFIED BACK PAIN LATERALITY, UNSPECIFIED WHETHER SCIATICA PRESENT: ICD-10-CM

## 2024-07-15 DIAGNOSIS — M25.561 ARTHRALGIA OF BOTH KNEES: ICD-10-CM

## 2024-07-15 DIAGNOSIS — M54.50 CHRONIC LOW BACK PAIN, UNSPECIFIED BACK PAIN LATERALITY, UNSPECIFIED WHETHER SCIATICA PRESENT: ICD-10-CM

## 2024-07-15 DIAGNOSIS — M25.562 ARTHRALGIA OF BOTH KNEES: ICD-10-CM

## 2024-07-15 RX ORDER — TIZANIDINE 2 MG/1
2 TABLET ORAL EVERY 8 HOURS PRN
Qty: 30 TABLET | Refills: 0 | Status: SHIPPED | OUTPATIENT
Start: 2024-07-15

## 2024-07-19 ENCOUNTER — OFFICE VISIT (OUTPATIENT)
Dept: FAMILY MEDICINE CLINIC | Age: 49
End: 2024-07-19
Payer: COMMERCIAL

## 2024-07-19 VITALS
HEIGHT: 66 IN | BODY MASS INDEX: 42.59 KG/M2 | TEMPERATURE: 98 F | HEART RATE: 103 BPM | DIASTOLIC BLOOD PRESSURE: 88 MMHG | WEIGHT: 265 LBS | OXYGEN SATURATION: 93 % | SYSTOLIC BLOOD PRESSURE: 123 MMHG

## 2024-07-19 DIAGNOSIS — S00.83XA TRAUMATIC HEMATOMA OF CHEEK, INITIAL ENCOUNTER: Primary | ICD-10-CM

## 2024-07-19 PROCEDURE — 99213 OFFICE O/P EST LOW 20 MIN: CPT | Performed by: FAMILY MEDICINE

## 2024-07-27 PROBLEM — S00.83XA: Status: ACTIVE | Noted: 2024-07-27

## 2024-07-27 NOTE — ASSESSMENT & PLAN NOTE
Think mass does indeed represent a hematoma.  Told her to expect to see some dependent bruising over the next few days.  Can apply warm compress to speed the resolution.  Go ahead and check labs looking for contributing issues for hematoma development.

## 2024-07-27 NOTE — PROGRESS NOTES
"Chief Complaint  cheek pain (Patient c/o cheek pain/lump )    Subjective          Rosibel Baker presents to [facility]  History of Present Illness    Hit her left cheek on car door few days ago now has noticed a subcutaneous mass with little overlying bruising.  She assumes is related but wanted to double check          Current Outpatient Medications on File Prior to Visit   Medication Sig Dispense Refill    DULoxetine (CYMBALTA) 60 MG capsule Take 1 capsule by mouth Daily. 90 capsule 0    estradiol (VIVELLE-DOT) 0.1 MG/24HR patch Place 1 patch on the skin as directed by provider 2 (Two) Times a Week. 24 each 3    levothyroxine (SYNTHROID, LEVOTHROID) 137 MCG tablet TAKE ONE TABLET BY MOUTH EVERY DAY 90 tablet 1    metFORMIN ER (GLUCOPHAGE-XR) 500 MG 24 hr tablet Take 1 tablet by mouth 2 (Two) Times a Day With Meals. 180 tablet 1    spironolactone (ALDACTONE) 100 MG tablet TAKE ONE TABLET BY MOUTH TWICE DAILY 180 tablet 1    tiZANidine (ZANAFLEX) 2 MG tablet Take 1 tablet by mouth Every 8 (Eight) Hours As Needed for Muscle Spasms. 30 tablet 0     No current facility-administered medications on file prior to visit.       Review of Systems         Objective   Vital Signs:   /88 (BP Location: Left arm)   Pulse 103   Temp 98 °F (36.7 °C) (Oral)   Ht 166.4 cm (65.5\")   Wt 120 kg (265 lb)   SpO2 93%   BMI 43.43 kg/m²     Physical Exam     No acute distress  Color is good  There is a subcutaneous freely mobile mass left cheek just little bit lateral to the lips.  Some overlying bruising  No cervical supraclavicular submandibular adenopathy        Result Review :                     Assessment and Plan    Diagnoses and all orders for this visit:    1. Traumatic hematoma of cheek, initial encounter (Primary)  Assessment & Plan:  Think mass does indeed represent a hematoma.  Told her to expect to see some dependent bruising over the next few days.  Can apply warm compress to speed " the resolution.  Go ahead and check labs looking for contributing issues for hematoma development.          Follow Up   No follow-ups on file.  Patient was given instructions and counseling regarding her condition or for health maintenance advice. Please see specific information pulled into the AVS if appropriate.

## 2024-09-04 ENCOUNTER — HOSPITAL ENCOUNTER (OUTPATIENT)
Dept: CT IMAGING | Facility: HOSPITAL | Age: 49
Discharge: HOME | End: 2024-09-04
Admitting: NURSE PRACTITIONER
Payer: COMMERCIAL

## 2024-09-04 ENCOUNTER — OFFICE VISIT (OUTPATIENT)
Dept: FAMILY MEDICINE CLINIC | Age: 49
End: 2024-09-04
Payer: COMMERCIAL

## 2024-09-04 VITALS
HEART RATE: 131 BPM | WEIGHT: 266 LBS | OXYGEN SATURATION: 96 % | BODY MASS INDEX: 42.75 KG/M2 | TEMPERATURE: 97.9 F | HEIGHT: 66 IN | DIASTOLIC BLOOD PRESSURE: 74 MMHG | SYSTOLIC BLOOD PRESSURE: 107 MMHG

## 2024-09-04 DIAGNOSIS — N20.0 RIGHT NEPHROLITHIASIS: ICD-10-CM

## 2024-09-04 DIAGNOSIS — R35.0 URINARY FREQUENCY: ICD-10-CM

## 2024-09-04 DIAGNOSIS — R31.9 HEMATURIA, UNSPECIFIED TYPE: ICD-10-CM

## 2024-09-04 DIAGNOSIS — R11.0 NAUSEA: ICD-10-CM

## 2024-09-04 DIAGNOSIS — M54.50 ACUTE RIGHT-SIDED LOW BACK PAIN WITHOUT SCIATICA: Primary | ICD-10-CM

## 2024-09-04 DIAGNOSIS — R30.0 BURNING WITH URINATION: ICD-10-CM

## 2024-09-04 DIAGNOSIS — Z72.0 TOBACCO ABUSE: Primary | ICD-10-CM

## 2024-09-04 PROBLEM — R91.1 LUNG NODULE SEEN ON IMAGING STUDY: Status: ACTIVE | Noted: 2024-09-04

## 2024-09-04 LAB
BILIRUB BLD-MCNC: NEGATIVE MG/DL
CLARITY, POC: CLEAR
COLOR UR: ABNORMAL
EXPIRATION DATE: ABNORMAL
GLUCOSE UR STRIP-MCNC: ABNORMAL MG/DL
KETONES UR QL: NEGATIVE
LEUKOCYTE EST, POC: ABNORMAL
Lab: ABNORMAL
NITRITE UR-MCNC: POSITIVE MG/ML
PH UR: 5.5 [PH] (ref 5–8)
PROT UR STRIP-MCNC: ABNORMAL MG/DL
RBC # UR STRIP: ABNORMAL /UL
SP GR UR: 1.01 (ref 1–1.03)
UROBILINOGEN UR QL: NORMAL

## 2024-09-04 PROCEDURE — 74176 CT ABD & PELVIS W/O CONTRAST: CPT

## 2024-09-04 PROCEDURE — 96372 THER/PROPH/DIAG INJ SC/IM: CPT | Performed by: NURSE PRACTITIONER

## 2024-09-04 PROCEDURE — 81003 URINALYSIS AUTO W/O SCOPE: CPT | Performed by: NURSE PRACTITIONER

## 2024-09-04 PROCEDURE — 87077 CULTURE AEROBIC IDENTIFY: CPT | Performed by: NURSE PRACTITIONER

## 2024-09-04 PROCEDURE — 99214 OFFICE O/P EST MOD 30 MIN: CPT | Performed by: NURSE PRACTITIONER

## 2024-09-04 PROCEDURE — 87186 SC STD MICRODIL/AGAR DIL: CPT | Performed by: NURSE PRACTITIONER

## 2024-09-04 PROCEDURE — 87086 URINE CULTURE/COLONY COUNT: CPT | Performed by: NURSE PRACTITIONER

## 2024-09-04 RX ORDER — NITROFURANTOIN 25; 75 MG/1; MG/1
100 CAPSULE ORAL 2 TIMES DAILY
Qty: 10 CAPSULE | Refills: 0 | Status: SHIPPED | OUTPATIENT
Start: 2024-09-04

## 2024-09-04 RX ORDER — ONDANSETRON 4 MG/1
4 TABLET, ORALLY DISINTEGRATING ORAL EVERY 8 HOURS PRN
Qty: 20 TABLET | Refills: 0 | Status: SHIPPED | OUTPATIENT
Start: 2024-09-04

## 2024-09-04 RX ORDER — KETOROLAC TROMETHAMINE 30 MG/ML
60 INJECTION, SOLUTION INTRAMUSCULAR; INTRAVENOUS ONCE
Status: COMPLETED | OUTPATIENT
Start: 2024-09-04 | End: 2024-09-04

## 2024-09-04 RX ORDER — TRAMADOL HYDROCHLORIDE 50 MG/1
50 TABLET ORAL EVERY 8 HOURS PRN
Qty: 9 TABLET | Refills: 0 | Status: SHIPPED | OUTPATIENT
Start: 2024-09-04

## 2024-09-04 RX ADMIN — KETOROLAC TROMETHAMINE 60 MG: 30 INJECTION, SOLUTION INTRAMUSCULAR; INTRAVENOUS at 09:00

## 2024-09-04 NOTE — PROGRESS NOTES
Has small right kidney stone that is partially obstructing .  I can send in tramadol to take prn for up to 72 hours.  Increase fluids.  Refer to urology.  Right basilar lung nodules with follow up chest ct recommended in 12 months.  Can discuss with PCP.

## 2024-09-04 NOTE — ASSESSMENT & PLAN NOTE
Patient tearful due to current pain level.  She has previously tolerated Toradol without any difficulty or side effects.  Has taken Azo, ibuprofen, or Aleve.  Azo could result in a false positive nitrite level but leukocytes and small amount of blood also noted.  She is go to the emergency room if any worsening of symptoms.  Increase fluids.  Get CT abdomen and pelvis to rule out or confirm a kidney stone and will start Macrobid for potential UTI.  Urine sample collected will be sent for culture.  Further treatment recommendations pending testing results.

## 2024-09-04 NOTE — PROGRESS NOTES
"Chief Complaint  Difficulty Urinating (Patient c/o burning with urination, low back pain, fever, dizziness, urinary frequency for 1 week)    Subjective          Rosibel Baker presents to [facility]     Patient is a 49-year-old female who is here today with her .  Urinary frequency and burning on urination x 1 week.  Subjective fever last night and the right flank pain to right low back pain that is increasing.  Temperature not checked with thermometer.  Did have kidney stone about 25 years ago and symptoms are similar.  She has previously had a complete hysterectomy.  Suprapubic pain.  Has increased fluids.  Feels as of elevated heart rates due to pain level today.  Patient is tearful.  Patient has seen blood in her urine.  Also experiencing nausea without vomiting.     Objective   Vital Signs:   Vitals:    09/04/24 0823   BP: 107/74   BP Location: Right arm   Patient Position: Sitting   Cuff Size: Large Adult   Pulse: (!) 131   Temp: 97.9 °F (36.6 °C)   TempSrc: Temporal   SpO2: 96%   Weight: 121 kg (266 lb)   Height: 166.4 cm (65.5\")       Wt Readings from Last 3 Encounters:   09/04/24 121 kg (266 lb)   07/19/24 120 kg (265 lb)   02/23/24 121 kg (266 lb 6.4 oz)      BP Readings from Last 3 Encounters:   09/04/24 107/74   07/19/24 123/88   02/23/24 125/89       Body mass index is 43.59 kg/m².           Physical Exam  Vitals reviewed.   Constitutional:       General: She is not in acute distress.     Appearance: Normal appearance. She is well-developed. She is obese.   Cardiovascular:      Rate and Rhythm: Normal rate and regular rhythm.      Heart sounds: Normal heart sounds.   Pulmonary:      Effort: Pulmonary effort is normal.      Breath sounds: Normal breath sounds.   Abdominal:      General: Bowel sounds are normal. There is no distension.      Palpations: Abdomen is soft.      Tenderness: There is no guarding.   Musculoskeletal:      Right lower leg: No edema.      Left " lower leg: No edema.   Skin:     General: Skin is warm and dry.   Neurological:      General: No focal deficit present.      Mental Status: She is alert.   Psychiatric:         Attention and Perception: Attention normal.         Mood and Affect: Mood and affect normal.         Behavior: Behavior normal.           Current Outpatient Medications:     DULoxetine (CYMBALTA) 60 MG capsule, Take 1 capsule by mouth Daily., Disp: 90 capsule, Rfl: 0    estradiol (VIVELLE-DOT) 0.1 MG/24HR patch, Place 1 patch on the skin as directed by provider 2 (Two) Times a Week., Disp: 24 each, Rfl: 3    levothyroxine (SYNTHROID, LEVOTHROID) 137 MCG tablet, TAKE ONE TABLET BY MOUTH EVERY DAY, Disp: 90 tablet, Rfl: 1    metFORMIN ER (GLUCOPHAGE-XR) 500 MG 24 hr tablet, Take 1 tablet by mouth 2 (Two) Times a Day With Meals., Disp: 180 tablet, Rfl: 1    spironolactone (ALDACTONE) 100 MG tablet, TAKE ONE TABLET BY MOUTH TWICE DAILY, Disp: 180 tablet, Rfl: 1    tiZANidine (ZANAFLEX) 2 MG tablet, Take 1 tablet by mouth Every 8 (Eight) Hours As Needed for Muscle Spasms., Disp: 30 tablet, Rfl: 0    nitrofurantoin, macrocrystal-monohydrate, (Macrobid) 100 MG capsule, Take 1 capsule by mouth 2 (Two) Times a Day., Disp: 10 capsule, Rfl: 0    ondansetron ODT (ZOFRAN-ODT) 4 MG disintegrating tablet, Place 1 tablet on the tongue Every 8 (Eight) Hours As Needed for Nausea or Vomiting., Disp: 20 tablet, Rfl: 0    traMADol (ULTRAM) 50 MG tablet, Take 1 tablet by mouth Every 8 (Eight) Hours As Needed for Moderate Pain., Disp: 9 tablet, Rfl: 0  No current facility-administered medications for this visit.   Past Medical History:   Diagnosis Date    Anxiety and depression     Back pain     Disease of thyroid gland     History of kidney stones     Hypothyroidism     Low back pain     Medial meniscus tear     Obesity     Otitis media     Ovarian dysfunction     PONV (postoperative nausea and vomiting)     Rash     LONG TERM, LEFT BREAST. CLOSED    Seizure      Tobacco abuse     Vitamin D deficiency      Allergies   Allergen Reactions    Diclofenac Hives and Anaphylaxis     SEIZURE      Latex Hives and Rash    Sulfa Antibiotics Hives    Erythromycin Nausea Only               Result Review :     Common labs          2/23/2024    16:44   Common Labs   Glucose 95    BUN 14    Creatinine 0.97    Sodium 139    Potassium 5.0    Chloride 104    Calcium 9.9    Albumin 4.2    Total Bilirubin 0.3    Alkaline Phosphatase 97    AST (SGOT) 19    ALT (SGPT) 15    Hemoglobin A1C 6.20         No Images in the past 120 days found..           Social History     Tobacco Use   Smoking Status Every Day    Current packs/day: 1.00    Average packs/day: 1 pack/day for 32.7 years (32.7 ttl pk-yrs)    Types: Cigarettes    Start date: 1/1/1992   Smokeless Tobacco Never           Assessment and Plan    Diagnoses and all orders for this visit:    1. Acute right-sided low back pain without sciatica (Primary)  Assessment & Plan:  Patient tearful due to current pain level.  She has previously tolerated Toradol without any difficulty or side effects.  Has taken Azo, ibuprofen, or Aleve.  Azo could result in a false positive nitrite level but leukocytes and small amount of blood also noted.  She is go to the emergency room if any worsening of symptoms.  Increase fluids.  Get CT abdomen and pelvis to rule out or confirm a kidney stone and will start Macrobid for potential UTI.  Urine sample collected will be sent for culture.  Further treatment recommendations pending testing results.    Orders:  -     POCT urinalysis dipstick, automated  -     Urine Culture - Urine, Urine, Clean Catch  -     ketorolac (TORADOL) injection 60 mg    2. Burning with urination  -     POCT urinalysis dipstick, automated  -     Urine Culture - Urine, Urine, Clean Catch    3. Urinary frequency  -     POCT urinalysis dipstick, automated  -     Urine Culture - Urine, Urine, Clean Catch  -     nitrofurantoin,  macrocrystal-monohydrate, (Macrobid) 100 MG capsule; Take 1 capsule by mouth 2 (Two) Times a Day.  Dispense: 10 capsule; Refill: 0    4. Nausea  -     ondansetron ODT (ZOFRAN-ODT) 4 MG disintegrating tablet; Place 1 tablet on the tongue Every 8 (Eight) Hours As Needed for Nausea or Vomiting.  Dispense: 20 tablet; Refill: 0    5. Hematuria, unspecified type  -     CT Abdomen Pelvis Without Contrast; Future    6. Right nephrolithiasis  -     Ambulatory Referral to Urology  -     traMADol (ULTRAM) 50 MG tablet; Take 1 tablet by mouth Every 8 (Eight) Hours As Needed for Moderate Pain.  Dispense: 9 tablet; Refill: 0        Follow Up    No follow-ups on file.  Patient was given instructions and counseling regarding her condition or for health maintenance advice. Please see specific information pulled into the AVS if appropriate.

## 2024-09-07 LAB — BACTERIA SPEC AEROBE CULT: ABNORMAL

## 2024-09-12 ENCOUNTER — LAB (OUTPATIENT)
Dept: LAB | Facility: HOSPITAL | Age: 49
End: 2024-09-12
Payer: COMMERCIAL

## 2024-09-12 ENCOUNTER — OFFICE VISIT (OUTPATIENT)
Dept: FAMILY MEDICINE CLINIC | Age: 49
End: 2024-09-12
Payer: COMMERCIAL

## 2024-09-12 VITALS
OXYGEN SATURATION: 97 % | HEART RATE: 106 BPM | WEIGHT: 257.8 LBS | DIASTOLIC BLOOD PRESSURE: 78 MMHG | HEIGHT: 66 IN | TEMPERATURE: 98 F | BODY MASS INDEX: 41.43 KG/M2 | SYSTOLIC BLOOD PRESSURE: 101 MMHG

## 2024-09-12 DIAGNOSIS — R30.0 DYSURIA: ICD-10-CM

## 2024-09-12 DIAGNOSIS — N39.0 URINARY TRACT INFECTION WITH HEMATURIA, SITE UNSPECIFIED: Primary | ICD-10-CM

## 2024-09-12 DIAGNOSIS — R31.9 URINARY TRACT INFECTION WITH HEMATURIA, SITE UNSPECIFIED: Primary | ICD-10-CM

## 2024-09-12 LAB
BACTERIA UR QL AUTO: ABNORMAL /HPF
BILIRUB UR QL STRIP: ABNORMAL
CLARITY UR: ABNORMAL
COLOR UR: ABNORMAL
GLUCOSE UR STRIP-MCNC: NEGATIVE MG/DL
HGB UR QL STRIP.AUTO: ABNORMAL
KETONES UR QL STRIP: NEGATIVE
LEUKOCYTE ESTERASE UR QL STRIP.AUTO: ABNORMAL
NITRITE UR QL STRIP: POSITIVE
PH UR STRIP.AUTO: 5.5 [PH] (ref 5–8)
PROT UR QL STRIP: ABNORMAL
RBC # UR STRIP: ABNORMAL /HPF
REF LAB TEST METHOD: ABNORMAL
SP GR UR STRIP: 1.02 (ref 1–1.03)
SQUAMOUS #/AREA URNS HPF: ABNORMAL /HPF
UROBILINOGEN UR QL STRIP: ABNORMAL
WBC # UR STRIP: ABNORMAL /HPF

## 2024-09-12 PROCEDURE — 87186 SC STD MICRODIL/AGAR DIL: CPT

## 2024-09-12 PROCEDURE — 87086 URINE CULTURE/COLONY COUNT: CPT

## 2024-09-12 PROCEDURE — 81001 URINALYSIS AUTO W/SCOPE: CPT

## 2024-09-12 PROCEDURE — 99213 OFFICE O/P EST LOW 20 MIN: CPT | Performed by: NURSE PRACTITIONER

## 2024-09-12 RX ORDER — CEFDINIR 300 MG/1
300 CAPSULE ORAL 2 TIMES DAILY
Qty: 10 CAPSULE | Refills: 0 | Status: SHIPPED | OUTPATIENT
Start: 2024-09-12

## 2024-09-12 RX ORDER — PHENAZOPYRIDINE HYDROCHLORIDE 100 MG/1
100 TABLET, FILM COATED ORAL 3 TIMES DAILY PRN
Qty: 9 TABLET | Refills: 0 | Status: SHIPPED | OUTPATIENT
Start: 2024-09-12

## 2024-09-12 NOTE — PROGRESS NOTES
"Chief Complaint  Flank Pain (Passed kidney stone X 4 days ago ) and Dysuria (Burning & blood in urine , patient was seen on 9/4/24 /She finished Macrobid /Cloudy and foul smelling urine )    Subjective          Rosibel Baker presents to [facility]     Patient is a 49-year-old female who is here today to follow-up regarding recent kidney stones and UTI.  Patient states she did pass a stone 4 days ago.  She completed Macrobid.  She still feels concerned of a persisting urinary tract infection due to the bilateral intermittent flank discomfort, stabbing pain at the end of urination, hematuria noted in the morning only, and feels certain that infection persist.  She would like to get treated with a different antibiotic.  She will be seeing urology on September 23.     Objective   Vital Signs:   Vitals:    09/12/24 1300   BP: 101/78   BP Location: Right arm   Patient Position: Sitting   Cuff Size: Adult   Pulse: 106   Temp: 98 °F (36.7 °C)   TempSrc: Temporal   SpO2: 97%   Weight: 117 kg (257 lb 12.8 oz)   Height: 166.4 cm (65.5\")       Wt Readings from Last 3 Encounters:   09/12/24 117 kg (257 lb 12.8 oz)   09/04/24 121 kg (266 lb)   07/19/24 120 kg (265 lb)      BP Readings from Last 3 Encounters:   09/12/24 101/78   09/04/24 107/74   07/19/24 123/88       Body mass index is 42.25 kg/m².           Physical Exam  Vitals reviewed.   Constitutional:       General: She is not in acute distress.     Appearance: Normal appearance. She is well-developed. She is obese.   Cardiovascular:      Rate and Rhythm: Normal rate and regular rhythm.      Heart sounds: Normal heart sounds.   Pulmonary:      Effort: Pulmonary effort is normal.      Breath sounds: Normal breath sounds.   Musculoskeletal:      Right lower leg: No edema.      Left lower leg: No edema.   Skin:     General: Skin is warm and dry.   Neurological:      General: No focal deficit present.      Mental Status: She is alert. "   Psychiatric:         Attention and Perception: Attention normal.         Mood and Affect: Mood and affect normal.         Behavior: Behavior normal.           Current Outpatient Medications:     DULoxetine (CYMBALTA) 60 MG capsule, Take 1 capsule by mouth Daily., Disp: 90 capsule, Rfl: 0    estradiol (VIVELLE-DOT) 0.1 MG/24HR patch, Place 1 patch on the skin as directed by provider 2 (Two) Times a Week., Disp: 24 each, Rfl: 3    levothyroxine (SYNTHROID, LEVOTHROID) 137 MCG tablet, TAKE ONE TABLET BY MOUTH EVERY DAY, Disp: 90 tablet, Rfl: 1    metFORMIN ER (GLUCOPHAGE-XR) 500 MG 24 hr tablet, Take 1 tablet by mouth 2 (Two) Times a Day With Meals., Disp: 180 tablet, Rfl: 1    ondansetron ODT (ZOFRAN-ODT) 4 MG disintegrating tablet, Place 1 tablet on the tongue Every 8 (Eight) Hours As Needed for Nausea or Vomiting., Disp: 20 tablet, Rfl: 0    spironolactone (ALDACTONE) 100 MG tablet, TAKE ONE TABLET BY MOUTH TWICE DAILY, Disp: 180 tablet, Rfl: 1    tiZANidine (ZANAFLEX) 2 MG tablet, Take 1 tablet by mouth Every 8 (Eight) Hours As Needed for Muscle Spasms., Disp: 30 tablet, Rfl: 0    cefdinir (OMNICEF) 300 MG capsule, Take 1 capsule by mouth 2 (Two) Times a Day., Disp: 10 capsule, Rfl: 0    phenazopyridine (Pyridium) 100 MG tablet, Take 1 tablet by mouth 3 (Three) Times a Day As Needed for Bladder Spasms., Disp: 9 tablet, Rfl: 0   Past Medical History:   Diagnosis Date    Anxiety and depression     Back pain     Disease of thyroid gland     History of kidney stones     Hypothyroidism     Low back pain     Medial meniscus tear     Obesity     Otitis media     Ovarian dysfunction     PONV (postoperative nausea and vomiting)     Rash     LONG TERM, LEFT BREAST. CLOSED    Seizure     Tobacco abuse     Vitamin D deficiency      Allergies   Allergen Reactions    Diclofenac Hives and Anaphylaxis     SEIZURE      Latex Hives and Rash    Erythromycin Nausea Only               Result Review :     Common labs           2/23/2024    16:44   Common Labs   Glucose 95    BUN 14    Creatinine 0.97    Sodium 139    Potassium 5.0    Chloride 104    Calcium 9.9    Albumin 4.2    Total Bilirubin 0.3    Alkaline Phosphatase 97    AST (SGOT) 19    ALT (SGPT) 15    Hemoglobin A1C 6.20         CT Abdomen Pelvis Without Contrast    Result Date: 9/4/2024  1. 3 mm proximal right ureteral calculus causing mild obstructive uropathy 2. Right nephrolithiasis 3. Right basilar lung nodules. If the patient is high risk, follow-up chest CT in 12 months could be considered Electronically Signed: Terry Clay  9/4/2024 3:41 PM EDT  Workstation ID: [id number]             Social History     Tobacco Use   Smoking Status Every Day    Current packs/day: 1.00    Average packs/day: 1 pack/day for 32.7 years (32.7 ttl pk-yrs)    Types: Cigarettes    Start date: 1/1/1992   Smokeless Tobacco Never           Assessment and Plan    Diagnoses and all orders for this visit:    1. Urinary tract infection with hematuria, site unspecified (Primary)  -     cefdinir (OMNICEF) 300 MG capsule; Take 1 capsule by mouth 2 (Two) Times a Day.  Dispense: 10 capsule; Refill: 0    2. Dysuria  Assessment & Plan:  Will need to recollect a urine sample.  Last urine culture indicates that bacteria would be resistant to Bactrim and intermediate with use of Levaquin.  No other readily available oral antibiotics are listed on the sensitivity report.  Patient still reports moderate to severe urinary discomfort but pain level has markedly decreased since passing of a kidney stone.  Patient will leave a urine sample today which is essential before presumptively starting cefdinir and taking Pyridium for urinary discomfort.  Recommend that she decrease her intake of sodium and drink more water.  Patient states she only drinks 1 diet soda per day otherwise drinks water with 1 cup of coffee or occasional tea.  See urology as scheduled.  She may be taking an antibiotic that will not be beneficial or  may not be needed.  She verbalizes understanding.    Orders:  -     Urinalysis With Culture If Indicated - Urine, Clean Catch; Future  -     phenazopyridine (Pyridium) 100 MG tablet; Take 1 tablet by mouth 3 (Three) Times a Day As Needed for Bladder Spasms.  Dispense: 9 tablet; Refill: 0        Follow Up    No follow-ups on file.  Patient was given instructions and counseling regarding her condition or for health maintenance advice. Please see specific information pulled into the AVS if appropriate.

## 2024-09-12 NOTE — ASSESSMENT & PLAN NOTE
Will need to recollect a urine sample.  Last urine culture indicates that bacteria would be resistant to Bactrim and intermediate with use of Levaquin.  No other readily available oral antibiotics are listed on the sensitivity report.  Patient still reports moderate to severe urinary discomfort but pain level has markedly decreased since passing of a kidney stone.  Patient will leave a urine sample today which is essential before presumptively starting cefdinir and taking Pyridium for urinary discomfort.  Recommend that she decrease her intake of sodium and drink more water.  Patient states she only drinks 1 diet soda per day otherwise drinks water with 1 cup of coffee or occasional tea.  See urology as scheduled.  She may be taking an antibiotic that will not be beneficial or may not be needed.  She verbalizes understanding.

## 2024-09-14 LAB — BACTERIA SPEC AEROBE CULT: ABNORMAL

## 2024-09-16 DIAGNOSIS — R35.0 URINARY FREQUENCY: Primary | ICD-10-CM

## 2024-09-16 DIAGNOSIS — R11.0 NAUSEA: ICD-10-CM

## 2024-09-16 NOTE — PROGRESS NOTES
Please call and check patient's status.  She has less bacteria on this urine culture than the 1 previous.  However it is uncertain if cefdinir will be beneficial.  She completed a course of Macrobid.  It is resistant to sulfa most likely resistant to Levaquin.  All other options on this list are IM or IV antibiotics.  If she is getting better complete cefdinir and repeat a urinalysis with urine culture in approximately 10 to 14 days.  Follow-up with PCP if not improving.

## 2024-09-17 DIAGNOSIS — N76.0 ACUTE VAGINITIS: Primary | ICD-10-CM

## 2024-09-17 RX ORDER — FLUCONAZOLE 150 MG/1
150 TABLET ORAL ONCE
Qty: 1 TABLET | Refills: 0 | Status: SHIPPED | OUTPATIENT
Start: 2024-09-17 | End: 2024-09-17

## 2024-09-18 RX ORDER — ONDANSETRON 4 MG/1
4 TABLET, ORALLY DISINTEGRATING ORAL EVERY 8 HOURS PRN
Qty: 20 TABLET | Refills: 0 | Status: SHIPPED | OUTPATIENT
Start: 2024-09-18 | End: 2024-09-23

## 2024-09-20 ENCOUNTER — HOSPITAL ENCOUNTER (OUTPATIENT)
Dept: CT IMAGING | Facility: HOSPITAL | Age: 49
Discharge: HOME | End: 2024-09-20
Admitting: NURSE PRACTITIONER
Payer: COMMERCIAL

## 2024-09-20 DIAGNOSIS — Z72.0 TOBACCO ABUSE: ICD-10-CM

## 2024-09-20 PROCEDURE — 71271 CT THORAX LUNG CANCER SCR C-: CPT

## 2024-09-23 ENCOUNTER — OFFICE VISIT (OUTPATIENT)
Dept: UROLOGY | Facility: CLINIC | Age: 49
End: 2024-09-23
Payer: COMMERCIAL

## 2024-09-23 VITALS
BODY MASS INDEX: 40.82 KG/M2 | DIASTOLIC BLOOD PRESSURE: 86 MMHG | SYSTOLIC BLOOD PRESSURE: 112 MMHG | WEIGHT: 254 LBS | HEIGHT: 66 IN | HEART RATE: 91 BPM

## 2024-09-23 DIAGNOSIS — N30.01 ACUTE CYSTITIS WITH HEMATURIA: ICD-10-CM

## 2024-09-23 DIAGNOSIS — B37.9 YEAST INFECTION: ICD-10-CM

## 2024-09-23 DIAGNOSIS — N20.0 KIDNEY STONE: Primary | ICD-10-CM

## 2024-09-23 LAB
BILIRUB BLD-MCNC: NEGATIVE MG/DL
CLARITY, POC: CLEAR
COLOR UR: YELLOW
EXPIRATION DATE: ABNORMAL
GLUCOSE UR STRIP-MCNC: NEGATIVE MG/DL
KETONES UR QL: NEGATIVE
LEUKOCYTE EST, POC: ABNORMAL
Lab: ABNORMAL
NITRITE UR-MCNC: NEGATIVE MG/ML
PH UR: 6.5 [PH] (ref 5–8)
PROT UR STRIP-MCNC: ABNORMAL MG/DL
RBC # UR STRIP: ABNORMAL /UL
SP GR UR: 1.01 (ref 1–1.03)
UROBILINOGEN UR QL: ABNORMAL

## 2024-09-23 PROCEDURE — 99204 OFFICE O/P NEW MOD 45 MIN: CPT | Performed by: UROLOGY

## 2024-09-23 PROCEDURE — 87088 URINE BACTERIA CULTURE: CPT | Performed by: UROLOGY

## 2024-09-23 PROCEDURE — 87186 SC STD MICRODIL/AGAR DIL: CPT | Performed by: UROLOGY

## 2024-09-23 PROCEDURE — 81003 URINALYSIS AUTO W/O SCOPE: CPT | Performed by: UROLOGY

## 2024-09-23 PROCEDURE — 87086 URINE CULTURE/COLONY COUNT: CPT | Performed by: UROLOGY

## 2024-09-23 RX ORDER — FLUCONAZOLE 150 MG/1
150 TABLET ORAL DAILY
Qty: 2 TABLET | Refills: 0 | Status: SHIPPED | OUTPATIENT
Start: 2024-09-23 | End: 2024-09-25

## 2024-09-23 RX ORDER — LEVOFLOXACIN 500 MG/1
500 TABLET, FILM COATED ORAL DAILY
Qty: 7 TABLET | Refills: 0 | Status: SHIPPED | OUTPATIENT
Start: 2024-09-23 | End: 2024-09-30

## 2024-09-24 DIAGNOSIS — Z72.0 TOBACCO ABUSE: Primary | ICD-10-CM

## 2024-09-24 DIAGNOSIS — R91.8 MULTIPLE LUNG NODULES ON CT: ICD-10-CM

## 2024-09-25 DIAGNOSIS — F34.1 DYSTHYMIC DISORDER: Chronic | ICD-10-CM

## 2024-09-25 DIAGNOSIS — R73.03 PREDIABETES: Chronic | ICD-10-CM

## 2024-09-25 LAB — BACTERIA SPEC AEROBE CULT: ABNORMAL

## 2024-09-26 RX ORDER — METFORMIN HCL 500 MG
500 TABLET, EXTENDED RELEASE 24 HR ORAL 2 TIMES DAILY WITH MEALS
Qty: 180 TABLET | Refills: 0 | Status: SHIPPED | OUTPATIENT
Start: 2024-09-26

## 2024-09-26 RX ORDER — DULOXETIN HYDROCHLORIDE 60 MG/1
60 CAPSULE, DELAYED RELEASE ORAL DAILY
Qty: 90 CAPSULE | Refills: 0 | Status: SHIPPED | OUTPATIENT
Start: 2024-09-26

## 2024-10-22 ENCOUNTER — OFFICE VISIT (OUTPATIENT)
Dept: FAMILY MEDICINE CLINIC | Age: 49
End: 2024-10-22
Payer: COMMERCIAL

## 2024-10-22 VITALS
WEIGHT: 256 LBS | HEART RATE: 106 BPM | HEIGHT: 66 IN | OXYGEN SATURATION: 92 % | DIASTOLIC BLOOD PRESSURE: 92 MMHG | TEMPERATURE: 98.3 F | SYSTOLIC BLOOD PRESSURE: 125 MMHG | BODY MASS INDEX: 41.14 KG/M2

## 2024-10-22 DIAGNOSIS — Z13.6 SCREENING FOR CARDIOVASCULAR CONDITION: ICD-10-CM

## 2024-10-22 DIAGNOSIS — L73.2 HIDRADENITIS SUPPURATIVA: ICD-10-CM

## 2024-10-22 DIAGNOSIS — G89.29 CHRONIC LOW BACK PAIN, UNSPECIFIED BACK PAIN LATERALITY, UNSPECIFIED WHETHER SCIATICA PRESENT: ICD-10-CM

## 2024-10-22 DIAGNOSIS — Z23 ENCOUNTER FOR IMMUNIZATION: ICD-10-CM

## 2024-10-22 DIAGNOSIS — M25.562 ARTHRALGIA OF BOTH KNEES: ICD-10-CM

## 2024-10-22 DIAGNOSIS — E03.9 HYPOTHYROIDISM, UNSPECIFIED TYPE: ICD-10-CM

## 2024-10-22 DIAGNOSIS — Z78.0 POST-MENOPAUSE: ICD-10-CM

## 2024-10-22 DIAGNOSIS — F34.1 DYSTHYMIC DISORDER: Chronic | ICD-10-CM

## 2024-10-22 DIAGNOSIS — M25.561 ARTHRALGIA OF BOTH KNEES: ICD-10-CM

## 2024-10-22 DIAGNOSIS — Z00.00 ROUTINE GENERAL MEDICAL EXAMINATION AT A HEALTH CARE FACILITY: Primary | ICD-10-CM

## 2024-10-22 DIAGNOSIS — R73.03 PREDIABETES: ICD-10-CM

## 2024-10-22 DIAGNOSIS — M54.50 CHRONIC LOW BACK PAIN, UNSPECIFIED BACK PAIN LATERALITY, UNSPECIFIED WHETHER SCIATICA PRESENT: ICD-10-CM

## 2024-10-22 DIAGNOSIS — R30.0 DYSURIA: ICD-10-CM

## 2024-10-22 DIAGNOSIS — R73.03 PREDIABETES: Chronic | ICD-10-CM

## 2024-10-22 LAB
BACTERIA UR QL AUTO: ABNORMAL /HPF
BILIRUB UR QL STRIP: NEGATIVE
CLARITY UR: ABNORMAL
COLOR UR: ABNORMAL
GLUCOSE UR STRIP-MCNC: NEGATIVE MG/DL
HGB UR QL STRIP.AUTO: ABNORMAL
KETONES UR QL STRIP: NEGATIVE
LEUKOCYTE ESTERASE UR QL STRIP.AUTO: ABNORMAL
NITRITE UR QL STRIP: NEGATIVE
PH UR STRIP.AUTO: 6 [PH] (ref 5–8)
PROT UR QL STRIP: ABNORMAL
RBC # UR STRIP: ABNORMAL /HPF
REF LAB TEST METHOD: ABNORMAL
SP GR UR STRIP: 1.02 (ref 1–1.03)
SQUAMOUS #/AREA URNS HPF: ABNORMAL /HPF
UROBILINOGEN UR QL STRIP: ABNORMAL
WBC # UR STRIP: ABNORMAL /HPF

## 2024-10-22 PROCEDURE — 81001 URINALYSIS AUTO W/SCOPE: CPT | Performed by: NURSE PRACTITIONER

## 2024-10-22 PROCEDURE — 99214 OFFICE O/P EST MOD 30 MIN: CPT | Performed by: NURSE PRACTITIONER

## 2024-10-22 PROCEDURE — 90656 IIV3 VACC NO PRSV 0.5 ML IM: CPT | Performed by: NURSE PRACTITIONER

## 2024-10-22 PROCEDURE — 99396 PREV VISIT EST AGE 40-64: CPT | Performed by: NURSE PRACTITIONER

## 2024-10-22 PROCEDURE — 87186 SC STD MICRODIL/AGAR DIL: CPT | Performed by: NURSE PRACTITIONER

## 2024-10-22 PROCEDURE — 87086 URINE CULTURE/COLONY COUNT: CPT | Performed by: NURSE PRACTITIONER

## 2024-10-22 PROCEDURE — 87077 CULTURE AEROBIC IDENTIFY: CPT | Performed by: NURSE PRACTITIONER

## 2024-10-22 PROCEDURE — 90471 IMMUNIZATION ADMIN: CPT | Performed by: NURSE PRACTITIONER

## 2024-10-22 RX ORDER — LEVOTHYROXINE SODIUM 137 UG/1
137 TABLET ORAL DAILY
Qty: 90 TABLET | Refills: 1 | Status: SHIPPED | OUTPATIENT
Start: 2024-10-22

## 2024-10-22 RX ORDER — DULOXETIN HYDROCHLORIDE 60 MG/1
60 CAPSULE, DELAYED RELEASE ORAL DAILY
Qty: 90 CAPSULE | Refills: 1 | Status: SHIPPED | OUTPATIENT
Start: 2024-10-22

## 2024-10-22 RX ORDER — SPIRONOLACTONE 100 MG/1
100 TABLET, FILM COATED ORAL 2 TIMES DAILY
Qty: 180 TABLET | Refills: 1 | Status: SHIPPED | OUTPATIENT
Start: 2024-10-22

## 2024-10-22 RX ORDER — TIZANIDINE 2 MG/1
2 TABLET ORAL EVERY 8 HOURS PRN
Qty: 30 TABLET | Refills: 2 | Status: SHIPPED | OUTPATIENT
Start: 2024-10-22

## 2024-10-22 RX ORDER — METFORMIN HYDROCHLORIDE 500 MG/1
500 TABLET, EXTENDED RELEASE ORAL 2 TIMES DAILY WITH MEALS
Qty: 180 TABLET | Refills: 1 | Status: SHIPPED | OUTPATIENT
Start: 2024-10-22

## 2024-10-22 RX ORDER — ESTRADIOL 0.1 MG/D
1 FILM, EXTENDED RELEASE TRANSDERMAL 2 TIMES WEEKLY
Qty: 24 EACH | Refills: 3 | Status: SHIPPED | OUTPATIENT
Start: 2024-10-24

## 2024-10-22 NOTE — PROGRESS NOTES
Chief Complaint  Rosibel Baker presents to [facility] for Annual Exam and Abnormal Imaging (Discuss chest CT findings )      Subjective     History of Present Illness  Rosibel is here today for annual exam.   Last annual exam was 1 year  Last eye exam: 9 months  PROVIDER: [facility]   Last dental exam:   6 months    PROVIDER:  [facility]  Last menstrual period: n/a - hysterectomy  Last Completed Pap Smear            Discontinued - PAP SMEAR  Discontinued      No completion history exists for this topic.                  Last Completed Mammogram            MAMMOGRAM (Every 2 Years) Next due on 8/10/2025      08/10/2023  MAMMO SCREENING DIGITAL TOMOSYNTHESIS BILATERAL W CAD    10/20/2017  Mammo Screening Bilateral With CAD    08/08/2016  Mammo Diagnostic Left With CAD    08/03/2016  Mammo Screening Bilateral With CAD                    Diet / exercise:   No special diet or specific exercise program  Class 3 Severe Obesity (BMI >=40). Obesity-related health conditions include the following: none. Obesity is unchanged. BMI is is above average; BMI management plan is completed. We discussed portion control and increasing exercise.      10 year cardiovascular risk assessment  The 10-year ASCVD risk score (Kenneth DK, et al., 2019) is: 4.1%    Values used to calculate the score:      Age: 49 years      Sex: Female      Is Non- : No      Diabetic: No      Tobacco smoker: Yes      Systolic Blood Pressure: 125 mmHg      Is BP treated: No      HDL Cholesterol: 39 mg/dL      Total Cholesterol: 168 mg/dL     Rosibel Baker DECLINES OR DEFERS THE FOLLOWING HEALTH MAINTENANCE RECOMMENDATIONS DISCUSSED TODAY:  >Zoster and >Covid 19 vaccination      Patient Care Team:  Patricia Osborne APRN as PCP - General (Nurse Practitioner)  Roly Godoy MD as Surgeon (Orthopedic Surgery)       PREdiabetes    A1C Last 3 Results          2/23/2024    16:44   HGBA1C Last 3  Results   Hemoglobin A1C 6.20      Current medications include metformin 1000 mg    Rosibel presents today for follow up on Depression and Anxiety   She reports that She Is doing well on current treatment of Cymbalta  She denies current suicidal and homicidal ideation.    Current psychotherapy : Loren Gleason presents for follow up on hypothyroidism.   has been taking medication as prescribed.    Medication includes :  levothyroxine  137 mcg  Current symptoms include:  fatigue.   Last   Lab Results   Component Value Date    TSH 0.673 02/23/2024         Taking sprionolactone for HS  no concerns at this time              Assessment and Plan     -well balanced diet and exercise as tolerated  -annual wellness exams are recommended  -health care maintenance and gaps in care discussed and orders have been placed as necessary and as willing by the patient.     Diagnoses and all orders for this visit:    1. Routine general medical examination at a health care facility (Primary)    2. Dysuria  Comments:  check urine  further recommendations pending results  Orders:  -     Urinalysis With Microscopic - Urine, Clean Catch; Future  -     Urine Culture - Urine, Urine, Clean Catch; Future  -     Urinalysis With Microscopic - Urine, Clean Catch  -     Urine Culture - Urine, Urine, Clean Catch    3. Arthralgia of both knees  -     tiZANidine (ZANAFLEX) 2 MG tablet; Take 1 tablet by mouth Every 8 (Eight) Hours As Needed for Muscle Spasms.  Dispense: 30 tablet; Refill: 2    4. Chronic low back pain, unspecified back pain laterality, unspecified whether sciatica present  -     tiZANidine (ZANAFLEX) 2 MG tablet; Take 1 tablet by mouth Every 8 (Eight) Hours As Needed for Muscle Spasms.  Dispense: 30 tablet; Refill: 2    5. Prediabetes  -     Hemoglobin A1c; Future  -     metFORMIN ER (GLUCOPHAGE-XR) 500 MG 24 hr tablet; Take 1 tablet by mouth 2 (Two) Times a Day With Meals.  Dispense: 180 tablet; Refill: 1    6. Hypothyroidism, unspecified  type  -     TSH Rfx On Abnormal To Free T4; Future  -     levothyroxine (SYNTHROID, LEVOTHROID) 137 MCG tablet; Take 1 tablet by mouth Daily.  Dispense: 90 tablet; Refill: 1    7. Dysthymic disorder  Comments:  continue current treatment  follow up in 6 months   Orders:  -     DULoxetine (CYMBALTA) 60 MG capsule; Take 1 capsule by mouth Daily.  Dispense: 90 capsule; Refill: 1    8. Hidradenitis suppurativa  -     spironolactone (ALDACTONE) 100 MG tablet; Take 1 tablet by mouth 2 (Two) Times a Day.  Dispense: 180 tablet; Refill: 1    9. Prediabetes  Comments:  continue current treatment   follow up in 6 months   Orders:  -     Hemoglobin A1c; Future  -     metFORMIN ER (GLUCOPHAGE-XR) 500 MG 24 hr tablet; Take 1 tablet by mouth 2 (Two) Times a Day With Meals.  Dispense: 180 tablet; Refill: 1    10. Post-menopause  -     estradiol (VIVELLE-DOT) 0.1 MG/24HR patch; Place 1 patch on the skin as directed by provider 2 (Two) Times a Week.  Dispense: 24 each; Refill: 3    11. Encounter for immunization  -     Fluzone >6mos ([id number])    12. Screening for cardiovascular condition  -     Comprehensive metabolic panel; Future  -     Lipid panel; Future                Follow Up   Return in about 3 months (around 1/22/2025) for Recheck.  No future appointments.    New Medications Ordered This Visit   Medications    tiZANidine (ZANAFLEX) 2 MG tablet     Sig: Take 1 tablet by mouth Every 8 (Eight) Hours As Needed for Muscle Spasms.     Dispense:  30 tablet     Refill:  2    DULoxetine (CYMBALTA) 60 MG capsule     Sig: Take 1 capsule by mouth Daily.     Dispense:  90 capsule     Refill:  1    spironolactone (ALDACTONE) 100 MG tablet     Sig: Take 1 tablet by mouth 2 (Two) Times a Day.     Dispense:  180 tablet     Refill:  1    levothyroxine (SYNTHROID, LEVOTHROID) 137 MCG tablet     Sig: Take 1 tablet by mouth Daily.     Dispense:  90 tablet     Refill:  1    metFORMIN ER (GLUCOPHAGE-XR) 500 MG 24 hr tablet     Sig: Take 1 tablet  "by mouth 2 (Two) Times a Day With Meals.     Dispense:  180 tablet     Refill:  1    estradiol (VIVELLE-DOT) 0.1 MG/24HR patch     Sig: Place 1 patch on the skin as directed by provider 2 (Two) Times a Week.     Dispense:  24 each     Refill:  3       Medications Discontinued During This Encounter   Medication Reason    tiZANidine (ZANAFLEX) 2 MG tablet *Therapy completed    estradiol (VIVELLE-DOT) 0.1 MG/24HR patch Reorder    levothyroxine (SYNTHROID, LEVOTHROID) 137 MCG tablet Reorder    spironolactone (ALDACTONE) 100 MG tablet Reorder    DULoxetine (CYMBALTA) 60 MG capsule Reorder    metFORMIN ER (GLUCOPHAGE-XR) 500 MG 24 hr tablet Reorder        Review of Systems    Objective     Vitals:    10/22/24 1411   BP: 125/92   BP Location: Left arm   Patient Position: Sitting   Cuff Size: Adult   Pulse: 106   Temp: 98.3 °F (36.8 °C)   TempSrc: Oral   SpO2: 92%   Weight: 116 kg (256 lb)   Height: 167.6 cm (66\")     Body mass index is 41.32 kg/m².       Physical Exam  Constitutional:       General: She is not in acute distress.     Appearance: Normal appearance. She is obese.   HENT:      Head: Normocephalic.   Cardiovascular:      Rate and Rhythm: Normal rate and regular rhythm.   Pulmonary:      Effort: Pulmonary effort is normal.      Breath sounds: Normal breath sounds.   Musculoskeletal:         General: Normal range of motion.   Neurological:      General: No focal deficit present.      Mental Status: She is alert and oriented to person, place, and time.   Psychiatric:         Mood and Affect: Mood normal.         Behavior: Behavior normal.                 Result Review          Allergies   Allergen Reactions    Diclofenac Hives and Anaphylaxis     SEIZURE      Latex Hives and Rash    Erythromycin Nausea Only      Past Medical History:   Diagnosis Date    Anxiety and depression     Back pain     Disease of thyroid gland     History of kidney stones     Hypothyroidism     Low back pain     Medial meniscus tear     " Obesity     Otitis media     Ovarian dysfunction     PONV (postoperative nausea and vomiting)     Rash     LONG TERM, LEFT BREAST. CLOSED    Seizure     Tobacco abuse     Vitamin D deficiency      Current Outpatient Medications   Medication Sig Dispense Refill    DULoxetine (CYMBALTA) 60 MG capsule Take 1 capsule by mouth Daily. 90 capsule 1    estradiol (VIVELLE-DOT) 0.1 MG/24HR patch Place 1 patch on the skin as directed by provider 2 (Two) Times a Week. 24 each 3    levothyroxine (SYNTHROID, LEVOTHROID) 137 MCG tablet Take 1 tablet by mouth Daily. 90 tablet 1    metFORMIN ER (GLUCOPHAGE-XR) 500 MG 24 hr tablet Take 1 tablet by mouth 2 (Two) Times a Day With Meals. 180 tablet 1    spironolactone (ALDACTONE) 100 MG tablet Take 1 tablet by mouth 2 (Two) Times a Day. 180 tablet 1    tiZANidine (ZANAFLEX) 2 MG tablet Take 1 tablet by mouth Every 8 (Eight) Hours As Needed for Muscle Spasms. 30 tablet 2    nitrofurantoin, macrocrystal-monohydrate, (Macrobid) 100 MG capsule Take 1 capsule by mouth 2 (Two) Times a Day for 7 days. 14 capsule 0     No current facility-administered medications for this visit.     Past Surgical History:   Procedure Laterality Date    ADENOIDECTOMY      CHOLECYSTECTOMY      CHOLECYSTECTOMY      HYSTERECTOMY      HYSTERECTOMY      KNEE ARTHROSCOPY Left 1/29/2021    Procedure: KNEE ARTHROSCOPY, PARTIAL MEDIAL MENISECTOMY;  Surgeon: Roly Godoy MD;  [place] [facility] [place] [facility] [place]  Service: [place]opedics;  Laterality: Left;    POLYPECTOMY      THYROID SURGERY      TONSILLECTOMY        Health Maintenance Due   Topic Date Due    ZOSTER VACCINE (1 of 2) Never done    COVID-19 Vaccine (4 - 2023-24 season) 09/01/2024      Immunization History   Administered Date(s) Administered    COVID-19 (PFIZER) Purple Cap Monovalent 05/04/2021, 05/25/2021, 01/11/2022    Fluzone  >6mos 12/02/2020, 10/22/2024    Fluzone (or Fluarix & Flulaval for VFC) >6mos 01/11/2022, 02/23/2024    Fluzone Quad >6mos (Multi-dose)  12/02/2020    Influenza Injectable Mdck Pf Quad 01/11/2022    Influenza, Unspecified 10/19/2017, 01/11/2022         Part of this note may be an electronic transcription/translation of spoken language to printed   text using the Dragon Dictation System.      Patricia Osborne, APRN

## 2024-10-23 DIAGNOSIS — R31.9 URINARY TRACT INFECTION WITH HEMATURIA, SITE UNSPECIFIED: Primary | ICD-10-CM

## 2024-10-23 DIAGNOSIS — N39.0 URINARY TRACT INFECTION WITH HEMATURIA, SITE UNSPECIFIED: Primary | ICD-10-CM

## 2024-10-23 RX ORDER — NITROFURANTOIN 25; 75 MG/1; MG/1
100 CAPSULE ORAL 2 TIMES DAILY
Qty: 14 CAPSULE | Refills: 0 | Status: SHIPPED | OUTPATIENT
Start: 2024-10-23 | End: 2024-10-30

## 2024-10-24 LAB — BACTERIA SPEC AEROBE CULT: ABNORMAL

## 2024-11-06 ENCOUNTER — LAB (OUTPATIENT)
Dept: LAB | Facility: HOSPITAL | Age: 49
End: 2024-11-06
Payer: COMMERCIAL

## 2024-11-06 ENCOUNTER — TELEPHONE (OUTPATIENT)
Dept: FAMILY MEDICINE CLINIC | Age: 49
End: 2024-11-06
Payer: COMMERCIAL

## 2024-11-06 DIAGNOSIS — R31.9 URINARY TRACT INFECTION WITH HEMATURIA, SITE UNSPECIFIED: ICD-10-CM

## 2024-11-06 DIAGNOSIS — N39.0 URINARY TRACT INFECTION WITH HEMATURIA, SITE UNSPECIFIED: ICD-10-CM

## 2024-11-06 LAB
BACTERIA UR QL AUTO: ABNORMAL /HPF
BILIRUB UR QL STRIP: NEGATIVE
CLARITY UR: ABNORMAL
COLOR UR: YELLOW
GLUCOSE UR STRIP-MCNC: NEGATIVE MG/DL
HGB UR QL STRIP.AUTO: ABNORMAL
KETONES UR QL STRIP: NEGATIVE
LEUKOCYTE ESTERASE UR QL STRIP.AUTO: ABNORMAL
NITRITE UR QL STRIP: NEGATIVE
PH UR STRIP.AUTO: 6 [PH] (ref 5–8)
PROT UR QL STRIP: NEGATIVE
RBC # UR STRIP: ABNORMAL /HPF
REF LAB TEST METHOD: ABNORMAL
SP GR UR STRIP: 1.01 (ref 1–1.03)
SQUAMOUS #/AREA URNS HPF: ABNORMAL /HPF
UROBILINOGEN UR QL STRIP: ABNORMAL
WBC # UR STRIP: ABNORMAL /HPF

## 2024-11-06 PROCEDURE — 87186 SC STD MICRODIL/AGAR DIL: CPT

## 2024-11-06 PROCEDURE — 87077 CULTURE AEROBIC IDENTIFY: CPT

## 2024-11-06 PROCEDURE — 81001 URINALYSIS AUTO W/SCOPE: CPT

## 2024-11-06 PROCEDURE — 87086 URINE CULTURE/COLONY COUNT: CPT

## 2024-11-06 NOTE — TELEPHONE ENCOUNTER
Caller: Rosibel Baker    Relationship: Self    Best call back number: [phone]    What is the best time to reach you: ANY    Who are you requesting to speak with (clinical staff, provider,  specific staff member): CLINICAL       What was the call regarding: PATIENT STATED HER SYMPTOMS OF HER URINARY TRACT INFECTION HAS RETURNED. PATIENT WOULD LIKE TO KNOW THE NEXT STEPS TO TAKE.

## 2024-11-07 DIAGNOSIS — N30.00 ACUTE CYSTITIS WITHOUT HEMATURIA: Primary | ICD-10-CM

## 2024-11-07 RX ORDER — NITROFURANTOIN 25; 75 MG/1; MG/1
100 CAPSULE ORAL 2 TIMES DAILY
Qty: 14 CAPSULE | Refills: 0 | Status: SHIPPED | OUTPATIENT
Start: 2024-11-07 | End: 2024-11-14

## 2024-11-08 LAB — BACTERIA SPEC AEROBE CULT: ABNORMAL

## 2024-11-18 ENCOUNTER — TELEPHONE (OUTPATIENT)
Dept: FAMILY MEDICINE CLINIC | Age: 49
End: 2024-11-18
Payer: COMMERCIAL

## 2024-11-18 DIAGNOSIS — R31.9 URINARY TRACT INFECTION WITH HEMATURIA, SITE UNSPECIFIED: Primary | ICD-10-CM

## 2024-11-18 DIAGNOSIS — N39.0 URINARY TRACT INFECTION WITH HEMATURIA, SITE UNSPECIFIED: Primary | ICD-10-CM

## 2024-11-18 NOTE — TELEPHONE ENCOUNTER
Caller: Rosibel Baker    Relationship: Self    Best call back number: [phone]    What orders are you requesting (i.e. lab or imaging): URINE TEST IF NEEDED BEFORE APPOINTMENT TOMORROW, 11/19/2024    In what timeframe would the patient need to come in: TODAY, 11/18/2024    Where will you receive your lab/imaging services: IN OFFICE    Additional notes: PATIENT WOULD LIKE TO KNOW IF SHE SHOULD HAVE URINE TEST DONE BEFORE HER FOLLOW UP APPOINTMENT FOR UTI THAT IS SCHEDULED FOR TOMORROW MORNING, 11/19/2024. SHE WOULD LIKE A CALL BACK TO LET HER KNOW.

## 2024-11-19 ENCOUNTER — OFFICE VISIT (OUTPATIENT)
Dept: FAMILY MEDICINE CLINIC | Age: 49
End: 2024-11-19
Payer: COMMERCIAL

## 2024-11-19 ENCOUNTER — LAB (OUTPATIENT)
Dept: LAB | Facility: HOSPITAL | Age: 49
End: 2024-11-19
Payer: COMMERCIAL

## 2024-11-19 VITALS
HEART RATE: 80 BPM | HEIGHT: 66 IN | TEMPERATURE: 97.7 F | DIASTOLIC BLOOD PRESSURE: 84 MMHG | OXYGEN SATURATION: 95 % | SYSTOLIC BLOOD PRESSURE: 118 MMHG | BODY MASS INDEX: 41.85 KG/M2 | WEIGHT: 260.4 LBS

## 2024-11-19 DIAGNOSIS — R73.03 PREDIABETES: ICD-10-CM

## 2024-11-19 DIAGNOSIS — Z16.12 UTI DUE TO EXTENDED-SPECTRUM BETA LACTAMASE (ESBL) PRODUCING ESCHERICHIA COLI: Primary | ICD-10-CM

## 2024-11-19 DIAGNOSIS — N39.0 URINARY TRACT INFECTION WITH HEMATURIA, SITE UNSPECIFIED: ICD-10-CM

## 2024-11-19 DIAGNOSIS — N20.0 KIDNEY STONE: ICD-10-CM

## 2024-11-19 DIAGNOSIS — R31.9 URINARY TRACT INFECTION WITH HEMATURIA, SITE UNSPECIFIED: ICD-10-CM

## 2024-11-19 DIAGNOSIS — N39.0 UTI DUE TO EXTENDED-SPECTRUM BETA LACTAMASE (ESBL) PRODUCING ESCHERICHIA COLI: Primary | ICD-10-CM

## 2024-11-19 DIAGNOSIS — B96.29 UTI DUE TO EXTENDED-SPECTRUM BETA LACTAMASE (ESBL) PRODUCING ESCHERICHIA COLI: Primary | ICD-10-CM

## 2024-11-19 LAB
BACTERIA UR QL AUTO: ABNORMAL /HPF
BILIRUB UR QL STRIP: ABNORMAL
CLARITY UR: ABNORMAL
COLOR UR: ABNORMAL
GLUCOSE UR STRIP-MCNC: ABNORMAL MG/DL
HBA1C MFR BLD: 5.2 % (ref 4.8–5.6)
HGB UR QL STRIP.AUTO: ABNORMAL
KETONES UR QL STRIP: ABNORMAL
LEUKOCYTE ESTERASE UR QL STRIP.AUTO: ABNORMAL
NITRITE UR QL STRIP: ABNORMAL
PH UR STRIP.AUTO: ABNORMAL [PH]
PROT UR QL STRIP: ABNORMAL
RBC # UR STRIP: ABNORMAL /HPF
REF LAB TEST METHOD: ABNORMAL
SP GR UR STRIP: 1.02 (ref 1–1.03)
SQUAMOUS #/AREA URNS HPF: ABNORMAL /HPF
UROBILINOGEN UR QL STRIP: ABNORMAL
WBC # UR STRIP: ABNORMAL /HPF

## 2024-11-19 PROCEDURE — 81001 URINALYSIS AUTO W/SCOPE: CPT

## 2024-11-19 PROCEDURE — 36415 COLL VENOUS BLD VENIPUNCTURE: CPT

## 2024-11-19 PROCEDURE — 83036 HEMOGLOBIN GLYCOSYLATED A1C: CPT

## 2024-11-19 PROCEDURE — 87086 URINE CULTURE/COLONY COUNT: CPT

## 2024-11-19 PROCEDURE — 87077 CULTURE AEROBIC IDENTIFY: CPT

## 2024-11-19 PROCEDURE — 87186 SC STD MICRODIL/AGAR DIL: CPT

## 2024-11-19 PROCEDURE — 99214 OFFICE O/P EST MOD 30 MIN: CPT | Performed by: NURSE PRACTITIONER

## 2024-11-19 RX ORDER — NITROFURANTOIN 25; 75 MG/1; MG/1
100 CAPSULE ORAL 2 TIMES DAILY
Qty: 7 CAPSULE | Refills: 0 | Status: SHIPPED | OUTPATIENT
Start: 2024-11-19

## 2024-11-21 DIAGNOSIS — N20.0 KIDNEY STONE: Primary | ICD-10-CM

## 2024-11-21 DIAGNOSIS — N30.01 ACUTE CYSTITIS WITH HEMATURIA: ICD-10-CM

## 2024-11-21 DIAGNOSIS — Z16.12 UTI DUE TO EXTENDED-SPECTRUM BETA LACTAMASE (ESBL) PRODUCING ESCHERICHIA COLI: Primary | ICD-10-CM

## 2024-11-21 DIAGNOSIS — N39.0 UTI DUE TO EXTENDED-SPECTRUM BETA LACTAMASE (ESBL) PRODUCING ESCHERICHIA COLI: Primary | ICD-10-CM

## 2024-11-21 DIAGNOSIS — B96.29 UTI DUE TO EXTENDED-SPECTRUM BETA LACTAMASE (ESBL) PRODUCING ESCHERICHIA COLI: Primary | ICD-10-CM

## 2024-11-21 DIAGNOSIS — N20.0 KIDNEY STONE: ICD-10-CM

## 2024-11-21 LAB — BACTERIA SPEC AEROBE CULT: ABNORMAL

## 2024-11-21 RX ORDER — LEVOFLOXACIN 500 MG/1
500 TABLET, FILM COATED ORAL DAILY
Qty: 7 TABLET | Refills: 0 | Status: SHIPPED | OUTPATIENT
Start: 2024-11-21 | End: 2024-11-25 | Stop reason: SDUPTHER

## 2024-11-21 NOTE — PROGRESS NOTES
She had a ureteral stone on CT scan in September it looks like.  I bet she never passed it and that is the cause of her persistent infection.  She needs this stone removed.  I can see her next week and get her on the schedule.      Kimani:  Can you put this patient in to see me tomorrow or next week?   Thanks.

## 2024-11-22 ENCOUNTER — HOSPITAL ENCOUNTER (OUTPATIENT)
Dept: CT IMAGING | Facility: HOSPITAL | Age: 49
Discharge: HOME | End: 2024-11-22
Admitting: UROLOGY
Payer: COMMERCIAL

## 2024-11-22 DIAGNOSIS — N20.0 KIDNEY STONE: ICD-10-CM

## 2024-11-22 PROCEDURE — 74176 CT ABD & PELVIS W/O CONTRAST: CPT

## 2024-11-22 NOTE — PROGRESS NOTES
Chief Complaint  Rosibel Baker presents to [facility] for Urinary Tract Infection (Follow up )    Subjective     History of Present Illness  Richard is here to follow up on UTI symptoms as well as history of kidney stone.   9/4/2024 in office seen for urinary frequency and burning x 1 week with right flank pain  given macrobid x 5 days and CT abdomen / referral to urology Ct showing 3 mm right stone with mild obstructive uropathy  culture with ESBL ecoli   9/12/2024 visit   continued pain / hematuria and thought to have passed the stone - cefdinir x 5 days given   culture  noted ESBL ecoli  9/23/2024 urology appt   KUB ordered (not done) , levaquin x 7 days started and treated for yeast infection  10/22/2024 office for wellness exam and continued symptoms of dysuria culture with still ESBL ecoli  - treated again with macrobid    Called on 11/6/2024 stating symptoms continue- plan to repeat urine with persistent ESBL ecoli  - again on macrobid due to susceptibility     Urinary Tract Infection: Patient complains of burning with urination, dysuria, frequency, and pain in the right flank She has had symptoms for  ongoing but worse over the last week or so  .. Patient denies fever. Patient does have a history of recurrent UTI.    Assessment and Plan     Diagnoses and all orders for this visit:    1. UTI due to extended-spectrum beta lactamase (ESBL) producing Escherichia coli (Primary)  Comments:  will cover with macrobid pending susceptibility results.  consider Ertapenem outpatient treatment based on rsults.  contacted urology for further recs  Orders:  -     nitrofurantoin, macrocrystal-monohydrate, (Macrobid) 100 MG capsule; Take 1 capsule by mouth 2 (Two) Times a Day.  Dispense: 7 capsule; Refill: 0    2. Kidney stone  Comments:  discussed with Dr. Warren.  She will get her in to office for evaluaion of possible retained stone        I spent 29 minutes caring for Rosible on this date of  "service. This time includes time spent by me in the following activities:reviewing tests, performing a medically appropriate examination and/or evaluation , counseling and educating the patient/family/caregiver, ordering medications, tests, or procedures, referring and communicating with other health care professionals , documenting information in the medical record, and care coordination    Follow Up   Return if symptoms worsen or fail to improve as advised.  Future Appointments   Date Time Provider Department Center   11/22/2024  3:45 PM CAIO FLOYD CT 1 [facility]       New Medications Ordered This Visit   Medications    nitrofurantoin, macrocrystal-monohydrate, (Macrobid) 100 MG capsule     Sig: Take 1 capsule by mouth 2 (Two) Times a Day.     Dispense:  7 capsule     Refill:  0       There are no discontinued medications.       Review of Systems    Objective     Vitals:    11/19/24 0951   BP: 118/84   BP Location: Left arm   Patient Position: Sitting   Cuff Size: Large Adult   Pulse: 80   Temp: 97.7 °F (36.5 °C)   TempSrc: Oral   SpO2: 95%   Weight: 118 kg (260 lb 6.4 oz)   Height: 167.6 cm (66\")            Physical Exam          Result Review          Allergies   Allergen Reactions    Diclofenac Hives and Anaphylaxis     SEIZURE      Latex Hives and Rash    Erythromycin Nausea Only      Past Medical History:   Diagnosis Date    Anxiety and depression     Back pain     Disease of thyroid gland     History of kidney stones     Hypothyroidism     Low back pain     Medial meniscus tear     Obesity     Otitis media     Ovarian dysfunction     PONV (postoperative nausea and vomiting)     Rash     LONG TERM, LEFT BREAST. CLOSED    Seizure     Tobacco abuse     Vitamin D deficiency      Current Outpatient Medications   Medication Sig Dispense Refill    DULoxetine (CYMBALTA) 60 MG capsule Take 1 capsule by mouth Daily. 90 capsule 1    estradiol (VIVELLE-DOT) 0.1 MG/24HR patch Place 1 patch on the skin as " directed by provider 2 (Two) Times a Week. 24 each 3    levothyroxine (SYNTHROID, LEVOTHROID) 137 MCG tablet Take 1 tablet by mouth Daily. 90 tablet 1    metFORMIN ER (GLUCOPHAGE-XR) 500 MG 24 hr tablet Take 1 tablet by mouth 2 (Two) Times a Day With Meals. 180 tablet 1    spironolactone (ALDACTONE) 100 MG tablet Take 1 tablet by mouth 2 (Two) Times a Day. 180 tablet 1    tiZANidine (ZANAFLEX) 2 MG tablet Take 1 tablet by mouth Every 8 (Eight) Hours As Needed for Muscle Spasms. 30 tablet 2    levoFLOXacin (LEVAQUIN) 500 MG tablet Take 1 tablet by mouth Daily for 7 days. 7 tablet 0    nitrofurantoin, macrocrystal-monohydrate, (Macrobid) 100 MG capsule Take 1 capsule by mouth 2 (Two) Times a Day. 7 capsule 0     No current facility-administered medications for this visit.     Past Surgical History:   Procedure Laterality Date    ADENOIDECTOMY      CHOLECYSTECTOMY      CHOLECYSTECTOMY      HYSTERECTOMY      HYSTERECTOMY      KNEE ARTHROSCOPY Left 1/29/2021    Procedure: KNEE ARTHROSCOPY, PARTIAL MEDIAL MENISECTOMY;  Surgeon: Roly Godoy MD;  [place] [facility] [place] [facility] [place];  Service: Orthopedics;  Laterality: Left;    POLYPECTOMY      THYROID SURGERY      TONSILLECTOMY        Health Maintenance Due   Topic Date Due    ZOSTER VACCINE (1 of 2) Never done      Immunization History   Administered Date(s) Administered    COVID-19 (PFIZER) Purple Cap Monovalent 05/04/2021, 05/25/2021, 01/11/2022    Fluzone  >6mos 12/02/2020, 10/22/2024    Fluzone (or Fluarix & Flulaval for VFC) >6mos 01/11/2022, 02/23/2024    Fluzone Quad >6mos (Multi-dose) 12/02/2020    Influenza Injectable Mdck Pf Quad 01/11/2022    Influenza, Unspecified 10/19/2017, 01/11/2022         Part of this note may be an electronic transcription/translation of spoken language to printed   text using the Dragon Dictation System.      Patricia Osborne, APRN

## 2024-11-25 ENCOUNTER — TELEPHONE (OUTPATIENT)
Dept: UROLOGY | Age: 49
End: 2024-11-25
Payer: COMMERCIAL

## 2024-11-25 DIAGNOSIS — B96.29 UTI DUE TO EXTENDED-SPECTRUM BETA LACTAMASE (ESBL) PRODUCING ESCHERICHIA COLI: ICD-10-CM

## 2024-11-25 DIAGNOSIS — N20.0 KIDNEY STONE: ICD-10-CM

## 2024-11-25 DIAGNOSIS — N30.01 ACUTE CYSTITIS WITH HEMATURIA: ICD-10-CM

## 2024-11-25 DIAGNOSIS — Z16.12 UTI DUE TO EXTENDED-SPECTRUM BETA LACTAMASE (ESBL) PRODUCING ESCHERICHIA COLI: ICD-10-CM

## 2024-11-25 DIAGNOSIS — N39.0 UTI DUE TO EXTENDED-SPECTRUM BETA LACTAMASE (ESBL) PRODUCING ESCHERICHIA COLI: ICD-10-CM

## 2024-11-25 RX ORDER — LEVOFLOXACIN 500 MG/1
500 TABLET, FILM COATED ORAL DAILY
Qty: 7 TABLET | Refills: 0 | Status: SHIPPED | OUTPATIENT
Start: 2024-11-25 | End: 2024-12-02

## 2024-11-25 NOTE — TELEPHONE ENCOUNTER
LMOM informing patient that she passed the stone. However she has a new 2 mm stone in the right kidney. I offered  her an appt on Wednesday 12/4 at 9 am or 11 am. I asked her to call back to schedule.

## 2024-11-25 NOTE — PROGRESS NOTES
CT scan was without stone in the ureter.  She will likely need a longer course of antibiotics.  I would go with levaquin but do a 14 day course.  We will call to set her up an appt in December once she completes antibiotics.

## 2024-11-25 NOTE — TELEPHONE ENCOUNTER
PATIENT CALLED AND I READ THE MESSAGE TO HER, BENITA MOREIRA.    PATIENT SAID SHE WILL TAKE THE APPOINTMENT ON 12/04/24 AT 11:00 AM.    PLEASE SCHEDULE THE APPOINTMENT.  IT HAS NOT BEEN SCHEDULED YET.    #[phone]

## 2024-11-26 DIAGNOSIS — R11.0 NAUSEA: Primary | ICD-10-CM

## 2024-11-26 RX ORDER — ONDANSETRON 4 MG/1
4 TABLET, ORALLY DISINTEGRATING ORAL EVERY 8 HOURS PRN
Qty: 20 TABLET | Refills: 0 | Status: SHIPPED | OUTPATIENT
Start: 2024-11-26

## 2024-12-04 ENCOUNTER — OFFICE VISIT (OUTPATIENT)
Dept: UROLOGY | Age: 49
End: 2024-12-04
Payer: COMMERCIAL

## 2024-12-04 VITALS
DIASTOLIC BLOOD PRESSURE: 99 MMHG | WEIGHT: 260 LBS | SYSTOLIC BLOOD PRESSURE: 135 MMHG | HEIGHT: 66 IN | BODY MASS INDEX: 41.78 KG/M2

## 2024-12-04 DIAGNOSIS — N39.0 UTI DUE TO EXTENDED-SPECTRUM BETA LACTAMASE (ESBL) PRODUCING ESCHERICHIA COLI: ICD-10-CM

## 2024-12-04 DIAGNOSIS — Z16.12 UTI DUE TO EXTENDED-SPECTRUM BETA LACTAMASE (ESBL) PRODUCING ESCHERICHIA COLI: ICD-10-CM

## 2024-12-04 DIAGNOSIS — B96.29 UTI DUE TO EXTENDED-SPECTRUM BETA LACTAMASE (ESBL) PRODUCING ESCHERICHIA COLI: ICD-10-CM

## 2024-12-04 DIAGNOSIS — N20.0 KIDNEY STONE: Primary | ICD-10-CM

## 2024-12-04 LAB
BILIRUB BLD-MCNC: NEGATIVE MG/DL
CLARITY, POC: CLEAR
COLOR UR: YELLOW
EXPIRATION DATE: ABNORMAL
GLUCOSE UR STRIP-MCNC: NEGATIVE MG/DL
KETONES UR QL: NEGATIVE
LEUKOCYTE EST, POC: ABNORMAL
Lab: ABNORMAL
NITRITE UR-MCNC: NEGATIVE MG/ML
PH UR: 6 [PH] (ref 5–8)
PROT UR STRIP-MCNC: NEGATIVE MG/DL
RBC # UR STRIP: NEGATIVE /UL
SP GR UR: 1.02 (ref 1–1.03)
UROBILINOGEN UR QL: ABNORMAL

## 2024-12-04 NOTE — PROGRESS NOTES
"Chief Complaint  Kidney stone    Subjective          Rosibel Baker presents to [facility]    History of Present Illness  Patient is here with recent complaints of a passed stone.  She has a couple of other small stones up in her kidney.  She states she has stones previously about 25 years ago.  She has never had surgery for stones.  During all of this time over the last couple of months she also had UTI symptoms are pretty consistent.  She had several UTIs that were all E. coli.  Urine today appears clear.  She has not had any urinary symptoms since she passed her stone at the end of October.      Objective   Vital Signs:   /99   Ht 167.6 cm (66\")   Wt 118 kg (260 lb)   BMI 41.97 kg/m²       Physical Exam  Vitals and nursing note reviewed.   Constitutional:       Appearance: Normal appearance. She is well-developed.   Pulmonary:      Effort: Pulmonary effort is normal.      Breath sounds: Normal air entry.   Neurological:      Mental Status: She is alert and oriented to person, place, and time.      Motor: Motor function is intact.   Psychiatric:         Mood and Affect: Mood normal.         Behavior: Behavior normal.          Result Review :   The following data was reviewed by: Heather Warren MD on 12/04/2024:    Results for orders placed or performed in visit on 12/04/24   POC Urinalysis Dipstick, Automated    Collection Time: 12/04/24 11:09 AM    Specimen: Urine   Result Value Ref Range    Color Yellow Yellow, Straw, Dark Yellow, Latanya    Clarity, UA Clear Clear    Specific Gravity  1.020 1.005 - 1.030    pH, Urine 6.0 5.0 - 8.0    Leukocytes Small (1+) (A) Negative    Nitrite, UA Negative Negative    Protein, POC Negative Negative mg/dL    Glucose, UA Negative Negative mg/dL    Ketones, UA Negative Negative    Urobilinogen, UA 0.2 E.U./dL Normal, 0.2 E.U./dL    Bilirubin Negative Negative    Blood, UA Negative Negative    Lot Number [id number]     Expiration Date 102,025  "          Results    Procedure Component Value Ref Range Date/Time   CT Abdomen Pelvis Stone Protocol [[id number]] Collected: 11/22/24 1629   Order Status: Completed Updated: 11/22/24 1644   Narrative:     CT ABDOMEN PELVIS STONE PROTOCOL    Date of Exam: 11/22/2024 4:12 PM EST    Indication: renal stone  renal cell carcinoma.    Comparison: 9/4/2024    Technique: Axial CT images were obtained of the abdomen and pelvis without the administration of contrast. Reconstructed coronal and sagittal images were also obtained. Automated exposure control and iterative construction methods were used.        FINDINGS:    Abdomen/Pelvis:    Lower Chest: Stable noncalcified nodules measuring less than 4 mm noted. There is scarring within the lingula.    No free air noted below the diaphragm.    Organs: Limited noncontrast imaging of the pancreas demonstrates relative decreased attenuation suggesting fatty atrophy. Limited noncontrast imaging of the liver, spleen and adrenal glands are unremarkable.    Left kidney demonstrates no suspicious renal calculi and there is no evidence of obstructive uropathy. The visualized course of the ureter demonstrates no acute abnormality    Cortical scarring of the right kidney noted. Mild perinephric stranding noted. There are indeterminate exophytic structures off the inferior pole the right kidney measuring up to 1.6 cm which are similar to the prior study. Low-attenuation indeterminate  lesion at the superior pole measuring approximately 1 cm noted.    Punctate 2 mm nonobstructing calculus right kidney. There is no evidence of obstructive uropathy on the current study.    GI/Bowel: Stomach and the small bowel demonstrate no acute abnormality no suspicious mesenteric adenopathy noted. Ileocecal valve and appendix are unremarkable. There is diverticulosis without evidence of diverticulitis.    Pelvis: The urinary bladder appears unremarkable in appearance. Patient is status post hysterectomy. No  suspicious pelvic adenopathy or fluid collections are noted    Peritoneum/Retroperitoneum: Atherosclerotic changes of the aorta are noted. The aorta is normal in caliber. There is no suspicious retroperitoneal adenopathy    Bones/Soft Tissues: Small sclerotic focus along the left side of the sacrum again noted. No destructive bone lesion.   Impression:       1. 2 mm nonobstructing calculus right kidney. Obstructive uropathy noted on previous study has resolved    2. Indeterminate low-attenuation lesions within the right kidney, off the right kidney. Comparison with outside studies if available could always be considered. Further evaluation with CT or MRI could be considered to further evaluate as clinically  indicated.          Electronically Signed: Chau Bojorquez MD   11/22/2024 4:42 PM EST   Workstation ID: [id number]       Urine Culture          10/22/2024    15:11 11/6/2024    15:06 11/19/2024    09:23   Urine Culture   Urine Culture >100,000 CFU/mL Escherichia coli ESBL  >100,000 CFU/mL Escherichia coli ESBL  >100,000 CFU/mL Escherichia coli ESBL               Assessment and Plan    Diagnoses and all orders for this visit:    1. Kidney stone (Primary)  -     POC Urinalysis Dipstick, Automated  -     XR Abdomen KUB; Future    2. UTI due to extended-spectrum beta lactamase (ESBL) producing Escherichia coli    I reviewed her scans with her and her cultures.  If she continues to have recurrent E. coli UTIs we can go up and get the stones in her kidney.  Otherwise I will see her back in 1 year or sooner if needed.  Stone prevention handouts were given to the patient today.        Follow Up       Return in about 1 year (around 12/4/2025) for Annual Visit, KUB prior.  Patient was given instructions and counseling regarding her condition or for health maintenance advice. Please see specific information pulled into the AVS if appropriate.

## 2024-12-12 ENCOUNTER — PREP FOR SURGERY (OUTPATIENT)
Dept: OTHER | Facility: HOSPITAL | Age: 49
End: 2024-12-12
Payer: COMMERCIAL

## 2024-12-12 ENCOUNTER — TELEPHONE (OUTPATIENT)
Dept: UROLOGY | Age: 49
End: 2024-12-12
Payer: COMMERCIAL

## 2024-12-12 DIAGNOSIS — N20.0 KIDNEY STONE: Primary | ICD-10-CM

## 2024-12-12 DIAGNOSIS — N30.01 ACUTE CYSTITIS WITH HEMATURIA: Primary | ICD-10-CM

## 2024-12-12 DIAGNOSIS — N39.0 UTI DUE TO EXTENDED-SPECTRUM BETA LACTAMASE (ESBL) PRODUCING ESCHERICHIA COLI: Primary | ICD-10-CM

## 2024-12-12 DIAGNOSIS — B96.29 UTI DUE TO EXTENDED-SPECTRUM BETA LACTAMASE (ESBL) PRODUCING ESCHERICHIA COLI: Primary | ICD-10-CM

## 2024-12-12 DIAGNOSIS — Z16.12 UTI DUE TO EXTENDED-SPECTRUM BETA LACTAMASE (ESBL) PRODUCING ESCHERICHIA COLI: Primary | ICD-10-CM

## 2024-12-12 RX ORDER — SODIUM CHLORIDE 0.9 % (FLUSH) 0.9 %
10 SYRINGE (ML) INJECTION EVERY 12 HOURS SCHEDULED
OUTPATIENT
Start: 2024-12-12

## 2024-12-12 RX ORDER — SODIUM CHLORIDE 0.9 % (FLUSH) 0.9 %
10 SYRINGE (ML) INJECTION AS NEEDED
OUTPATIENT
Start: 2024-12-12

## 2024-12-12 RX ORDER — SODIUM CHLORIDE 9 MG/ML
40 INJECTION, SOLUTION INTRAVENOUS AS NEEDED
OUTPATIENT
Start: 2024-12-12

## 2024-12-12 RX ORDER — LEVOFLOXACIN 500 MG/1
500 TABLET, FILM COATED ORAL DAILY
Qty: 7 TABLET | Refills: 0 | Status: SHIPPED | OUTPATIENT
Start: 2024-12-12 | End: 2024-12-19

## 2024-12-12 NOTE — H&P (VIEW-ONLY)
[facility]   Urology HISTORY AND PHYSICAL    Patient Name: Rosibel Baker  : 1975  MRN: [id number]  Primary Care Physician:  Patricia Osborne APRN  Date of admission: (Not on file)    Subjective   Subjective       History of Present Illness  Patient has a 2 mm right renal pelvis stone and presents for right ureteroscopy, laser lithotripsy, right ureteral stent placement.      Personal History     Past Medical History:   Diagnosis Date    Anxiety and depression     Back pain     Disease of thyroid gland     History of kidney stones     Hypothyroidism     Low back pain     Medial meniscus tear     Obesity     Otitis media     Ovarian dysfunction     PONV (postoperative nausea and vomiting)     Rash     LONG TERM, LEFT BREAST. CLOSED    Seizure     Tobacco abuse     Vitamin D deficiency        Past Surgical History:   Procedure Laterality Date    ADENOIDECTOMY      CHOLECYSTECTOMY      CHOLECYSTECTOMY      HYSTERECTOMY      HYSTERECTOMY      KNEE ARTHROSCOPY Left 2021    Procedure: KNEE ARTHROSCOPY, PARTIAL MEDIAL MENISECTOMY;  Surgeon: Roly Godoy MD;  [place] [facility] [place] [facility] [place]: [place]opedics;  Laterality: Left;    POLYPECTOMY      THYROID SURGERY      TONSILLECTOMY         Family History: family history includes Diabetes in her maternal grandmother, paternal grandfather, and another family member; Heart disease in her maternal grandmother; Heart valve disorder in her mother; Other in her father; Retinal detachment in her father. Otherwise pertinent FHx was reviewed and not pertinent to current issue.    Social History:  reports that she has been smoking cigarettes. She started smoking about 32 years ago. She has a 32.9 pack-year smoking history. She has been exposed to tobacco smoke. She has never used smokeless tobacco. She reports current alcohol use. She reports that she does not use drugs.    Home Medications:  DULoxetine, estradiol, levothyroxine, metFORMIN ER, nitrofurantoin  (macrocrystal-monohydrate), ondansetron ODT, spironolactone, and tiZANidine    Allergies:  Allergies   Allergen Reactions    Diclofenac Hives and Anaphylaxis     SEIZURE      Latex Hives and Rash    Erythromycin Nausea Only       Objective    Objective     Vitals:        Physical Exam  Constitutional:       Appearance: Normal appearance.   Cardiovascular:      Rate and Rhythm: Normal rate and regular rhythm.   Pulmonary:      Effort: Pulmonary effort is normal.      Breath sounds: Normal breath sounds.   Neurological:      Mental Status: She is alert. Mental status is at baseline.   Psychiatric:         Mood and Affect: Mood and affect normal.         Speech: Speech normal.         Judgment: Judgment normal.         Result Review    Result Review:  I have personally reviewed the results from the time of this admission to 12/12/2024 11:35 EST and agree with these findings:  [x]  Laboratory  []  Microbiology  [x]  Radiology  []  EKG/Telemetry   []  Cardiology/Vascular   []  Pathology  [x]  Old records  []  Other:      Assessment & Plan   Assessment / Plan       Active Hospital Problems:  There are no active hospital problems to display for this patient.      Plan: right ureteroscopy, laser lithotripsy, right ureteral stent placement  Risks and benefits discussed with patient and they are agreeable to proceed.    VTE Prophylaxis:  No VTE prophylaxis order currently exists.        CODE STATUS:           Electronically signed by Heather Warren MD, 12/12/24, 11:35 AM EST.

## 2024-12-12 NOTE — TELEPHONE ENCOUNTER
Spoke to patient and scheduled procedure for 12/19/24 . I went over preop instructions and informed that I would be mailing the information. Also instructed patient to start antibiotic today that Dr. Warren ordered and not to give the urine culture. Patient voiced understanding.

## 2024-12-12 NOTE — H&P
[facility]   Urology HISTORY AND PHYSICAL    Patient Name: Rosibel Baker  : 1975  MRN: [id number]  Primary Care Physician:  Patricia Osborne APRN  Date of admission: (Not on file)    Subjective   Subjective       History of Present Illness  Patient has a 2 mm right renal pelvis stone and presents for right ureteroscopy, laser lithotripsy, right ureteral stent placement.      Personal History     Past Medical History:   Diagnosis Date    Anxiety and depression     Back pain     Disease of thyroid gland     History of kidney stones     Hypothyroidism     Low back pain     Medial meniscus tear     Obesity     Otitis media     Ovarian dysfunction     PONV (postoperative nausea and vomiting)     Rash     LONG TERM, LEFT BREAST. CLOSED    Seizure     Tobacco abuse     Vitamin D deficiency        Past Surgical History:   Procedure Laterality Date    ADENOIDECTOMY      CHOLECYSTECTOMY      CHOLECYSTECTOMY      HYSTERECTOMY      HYSTERECTOMY      KNEE ARTHROSCOPY Left 2021    Procedure: KNEE ARTHROSCOPY, PARTIAL MEDIAL MENISECTOMY;  Surgeon: Roly Godoy MD;  [place] [facility] [place] [facility] [place];  Service: Orthopedics;  Laterality: Left;    POLYPECTOMY      THYROID SURGERY      TONSILLECTOMY         Family History: family history includes Diabetes in her maternal grandmother, paternal grandfather, and another family member; Heart disease in her maternal grandmother; Heart valve disorder in her mother; Other in her father; Retinal detachment in her father. Otherwise pertinent FHx was reviewed and not pertinent to current issue.    Social History:  reports that she has been smoking cigarettes. She started smoking about 32 years ago. She has a 32.9 pack-year smoking history. She has been exposed to tobacco smoke. She has never used smokeless tobacco. She reports current alcohol use. She reports that she does not use drugs.    Home Medications:  DULoxetine, estradiol, levothyroxine, metFORMIN ER, nitrofurantoin  (macrocrystal-monohydrate), ondansetron ODT, spironolactone, and tiZANidine    Allergies:  Allergies   Allergen Reactions    Diclofenac Hives and Anaphylaxis     SEIZURE      Latex Hives and Rash    Erythromycin Nausea Only       Objective    Objective     Vitals:        Physical Exam  Constitutional:       Appearance: Normal appearance.   Cardiovascular:      Rate and Rhythm: Normal rate and regular rhythm.   Pulmonary:      Effort: Pulmonary effort is normal.      Breath sounds: Normal breath sounds.   Neurological:      Mental Status: She is alert. Mental status is at baseline.   Psychiatric:         Mood and Affect: Mood and affect normal.         Speech: Speech normal.         Judgment: Judgment normal.         Result Review    Result Review:  I have personally reviewed the results from the time of this admission to 12/12/2024 11:35 EST and agree with these findings:  [x]  Laboratory  []  Microbiology  [x]  Radiology  []  EKG/Telemetry   []  Cardiology/Vascular   []  Pathology  [x]  Old records  []  Other:      Assessment & Plan   Assessment / Plan       Active Hospital Problems:  There are no active hospital problems to display for this patient.      Plan: right ureteroscopy, laser lithotripsy, right ureteral stent placement  Risks and benefits discussed with patient and they are agreeable to proceed.    VTE Prophylaxis:  No VTE prophylaxis order currently exists.        CODE STATUS:           Electronically signed by Heather Warren MD, 12/12/24, 11:35 AM EST.

## 2024-12-16 NOTE — PRE-PROCEDURE INSTRUCTIONS
PATIENT INSTRUCTED TO BE:    - NOTHING TO EAT AFTER MIDNIGHT OR CHEW, EXCEPT CAN HAVE CLEAR LIQUIDS 2 HOURS PRIOR TO SURGERY ARRIVAL TIME , NO MORE THAN 8 OZ. (NOTHING RED)     - TO HOLD ALL VITAMINS, SUPPLEMENTS, NSAIDS FOR ONE WEEK PRIOR TO THEIR SURGICAL PROCEDURE        INSTRUCTED NO LOTIONS, JEWELRY, PIERCINGS,  NAIL POLISH, OR DEODORANT DAY OF SURGERY  -INSTRUCTED TO TAKE THE FOLLOWING MEDICATIONS THE DAY OF SURGERY WITH SIPS OF WATER: CYMBALTA, LEVAQUIN, LEVOTHYROXINE    TAKE EVENING DOSE OF METFORMIN BY 6 PM AND DO NOT TAKE METFORMIN AM OF SURGERY  DO NOT TAKE SPIRONOLACTONE AM OF SURGERY.     - DO NOT BRING ANY MEDICATIONS WITH YOU TO THE HOSPITAL THE DAY OF SURGERY, EXCEPT IF USE INHALERS. BRING INHALERS DAY OF SURGERY       - BRING CPAP OR BIPAP TO THE HOSPITAL ONLY IF YOU ARE SPENDING THE NIGHT    - DO NOT SMOKE OR VAPE 24 HOURS PRIOR TO PROCEDURE PER ANESTHESIA REQUEST     -MAKE SURE YOU HAVE A RIDE HOME OR SOMEONE TO STAY WITH YOU THE DAY OF THE PROCEDURE AFTER YOU GO HOME     - FOLLOW ANY OTHER INSTRUCTIONS GIVEN TO YOU BY YOUR SURGEON'S OFFICE.     - [facility], CHECK IN AT THE DESK FOR REGISTRATION/SURGERY - YOU WILL RECEIVE A PHONE CALL THE DAY PRIOR TO SURGERY BETWEEN 1PM AND 4 PM WITH ARRIVAL TIME, IF YOUR SURGERY IS ON A MONDAY YOU WILL RECEIVE A CALL THE FRIDAY PRIOR TO SURGERY DATE    - BRING CASH OR CREDIT CARD FOR COPAYMENT OF MEDICATIONS AFTER SURGERY IF YOU USE THE HOSPITAL PHARMACY (MEDS TO BED)    - PREADMISSION TESTING NURSE NASREEN Parsons [phone]  IF HAVE ANY QUESTIONS     -PATIENT PROVIDED THE NUMBER FOR PREOP SURGICAL DEPT IF HAD QUESTIONS AFTER HOURS PRIOR TO SURGERY ([phone]).  INFORMED PT IF NO ANSWER, LEAVE A MESSAGE AND SOMEONE WILL RETURN THEIR CALL       PATIENT VERBALIZED UNDERSTANDING

## 2024-12-19 ENCOUNTER — APPOINTMENT (OUTPATIENT)
Dept: GENERAL RADIOLOGY | Facility: HOSPITAL | Age: 49
End: 2024-12-19
Payer: COMMERCIAL

## 2024-12-19 ENCOUNTER — ANESTHESIA (OUTPATIENT)
Dept: PERIOP | Facility: HOSPITAL | Age: 49
End: 2024-12-19
Payer: COMMERCIAL

## 2024-12-19 ENCOUNTER — ANESTHESIA EVENT (OUTPATIENT)
Dept: PERIOP | Facility: HOSPITAL | Age: 49
End: 2024-12-19
Payer: COMMERCIAL

## 2024-12-19 ENCOUNTER — HOSPITAL ENCOUNTER (OUTPATIENT)
Facility: HOSPITAL | Age: 49
Setting detail: HOSPITAL OUTPATIENT SURGERY
Discharge: HOME | End: 2024-12-19
Attending: UROLOGY | Admitting: UROLOGY
Payer: COMMERCIAL

## 2024-12-19 VITALS
HEART RATE: 78 BPM | TEMPERATURE: 97.8 F | WEIGHT: 259.7 LBS | OXYGEN SATURATION: 99 % | DIASTOLIC BLOOD PRESSURE: 68 MMHG | RESPIRATION RATE: 16 BRPM | SYSTOLIC BLOOD PRESSURE: 125 MMHG | HEIGHT: 66 IN | BODY MASS INDEX: 41.74 KG/M2

## 2024-12-19 DIAGNOSIS — N20.0 KIDNEY STONE: ICD-10-CM

## 2024-12-19 LAB
ANION GAP SERPL CALCULATED.3IONS-SCNC: 9.9 MMOL/L (ref 5–15)
BUN SERPL-MCNC: 17 MG/DL (ref 6–20)
BUN/CREAT SERPL: 12 (ref 7–25)
CALCIUM SPEC-SCNC: 9.8 MG/DL (ref 8.6–10.5)
CHLORIDE SERPL-SCNC: 103 MMOL/L (ref 98–107)
CO2 SERPL-SCNC: 24.1 MMOL/L (ref 22–29)
CREAT SERPL-MCNC: 1.42 MG/DL (ref 0.57–1)
EGFRCR SERPLBLD CKD-EPI 2021: 45.2 ML/MIN/1.73
GLUCOSE BLDC GLUCOMTR-MCNC: 122 MG/DL (ref 70–99)
GLUCOSE BLDC GLUCOMTR-MCNC: 154 MG/DL (ref 70–99)
GLUCOSE SERPL-MCNC: 112 MG/DL (ref 65–99)
POTASSIUM SERPL-SCNC: 5.1 MMOL/L (ref 3.5–5.2)
SODIUM SERPL-SCNC: 137 MMOL/L (ref 136–145)

## 2024-12-19 PROCEDURE — C1758 CATHETER, URETERAL: HCPCS | Performed by: UROLOGY

## 2024-12-19 PROCEDURE — C1769 GUIDE WIRE: HCPCS | Performed by: UROLOGY

## 2024-12-19 PROCEDURE — 25010000002 METOCLOPRAMIDE PER 10 MG: Performed by: ANESTHESIOLOGY

## 2024-12-19 PROCEDURE — C1894 INTRO/SHEATH, NON-LASER: HCPCS | Performed by: UROLOGY

## 2024-12-19 PROCEDURE — 25010000002 LIDOCAINE PF 2% 2 % SOLUTION: Performed by: NURSE ANESTHETIST, CERTIFIED REGISTERED

## 2024-12-19 PROCEDURE — 25010000002 GLYCOPYRROLATE 0.2 MG/ML SOLUTION: Performed by: NURSE ANESTHETIST, CERTIFIED REGISTERED

## 2024-12-19 PROCEDURE — 82948 REAGENT STRIP/BLOOD GLUCOSE: CPT | Performed by: NURSE ANESTHETIST, CERTIFIED REGISTERED

## 2024-12-19 PROCEDURE — 80048 BASIC METABOLIC PNL TOTAL CA: CPT | Performed by: ANESTHESIOLOGY

## 2024-12-19 PROCEDURE — 25010000002 PROPOFOL 10 MG/ML EMULSION: Performed by: NURSE ANESTHETIST, CERTIFIED REGISTERED

## 2024-12-19 PROCEDURE — 52332 CYSTOSCOPY AND TREATMENT: CPT | Performed by: UROLOGY

## 2024-12-19 PROCEDURE — 52352 CYSTOURETERO W/STONE REMOVE: CPT | Performed by: UROLOGY

## 2024-12-19 PROCEDURE — C2617 STENT, NON-COR, TEM W/O DEL: HCPCS | Performed by: UROLOGY

## 2024-12-19 PROCEDURE — 25010000002 DEXAMETHASONE PER 1 MG: Performed by: NURSE ANESTHETIST, CERTIFIED REGISTERED

## 2024-12-19 PROCEDURE — 25010000002 MIDAZOLAM PER 1MG: Performed by: ANESTHESIOLOGY

## 2024-12-19 PROCEDURE — 25010000002 ONDANSETRON PER 1 MG: Performed by: NURSE ANESTHETIST, CERTIFIED REGISTERED

## 2024-12-19 PROCEDURE — 25810000003 LACTATED RINGERS PER 1000 ML: Performed by: ANESTHESIOLOGY

## 2024-12-19 PROCEDURE — 82948 REAGENT STRIP/BLOOD GLUCOSE: CPT

## 2024-12-19 PROCEDURE — C1747: HCPCS | Performed by: UROLOGY

## 2024-12-19 PROCEDURE — 25010000002 FENTANYL CITRATE (PF) 50 MCG/ML SOLUTION: Performed by: NURSE ANESTHETIST, CERTIFIED REGISTERED

## 2024-12-19 PROCEDURE — 25010000002 CEFAZOLIN PER 500 MG: Performed by: UROLOGY

## 2024-12-19 PROCEDURE — 76000 FLUOROSCOPY <1 HR PHYS/QHP: CPT

## 2024-12-19 DEVICE — URETERAL STENT
Type: IMPLANTABLE DEVICE | Site: URETER | Status: FUNCTIONAL
Brand: ASCERTA™

## 2024-12-19 RX ORDER — DEXAMETHASONE SODIUM PHOSPHATE 4 MG/ML
INJECTION, SOLUTION INTRA-ARTICULAR; INTRALESIONAL; INTRAMUSCULAR; INTRAVENOUS; SOFT TISSUE AS NEEDED
Status: DISCONTINUED | OUTPATIENT
Start: 2024-12-19 | End: 2024-12-19 | Stop reason: SURG

## 2024-12-19 RX ORDER — ACETAMINOPHEN 325 MG/1
650 TABLET ORAL ONCE
Status: DISCONTINUED | OUTPATIENT
Start: 2024-12-19 | End: 2024-12-19 | Stop reason: HOSPADM

## 2024-12-19 RX ORDER — DEXMEDETOMIDINE HYDROCHLORIDE 100 UG/ML
INJECTION, SOLUTION INTRAVENOUS AS NEEDED
Status: DISCONTINUED | OUTPATIENT
Start: 2024-12-19 | End: 2024-12-19 | Stop reason: SURG

## 2024-12-19 RX ORDER — SODIUM CHLORIDE 0.9 % (FLUSH) 0.9 %
10 SYRINGE (ML) INJECTION AS NEEDED
Status: DISCONTINUED | OUTPATIENT
Start: 2024-12-19 | End: 2024-12-19 | Stop reason: HOSPADM

## 2024-12-19 RX ORDER — SCOLOPAMINE TRANSDERMAL SYSTEM 1 MG/1
1 PATCH, EXTENDED RELEASE TRANSDERMAL ONCE
Status: DISCONTINUED | OUTPATIENT
Start: 2024-12-19 | End: 2024-12-19 | Stop reason: HOSPADM

## 2024-12-19 RX ORDER — MEPERIDINE HYDROCHLORIDE 25 MG/ML
12.5 INJECTION INTRAMUSCULAR; INTRAVENOUS; SUBCUTANEOUS
Status: DISCONTINUED | OUTPATIENT
Start: 2024-12-19 | End: 2024-12-19 | Stop reason: HOSPADM

## 2024-12-19 RX ORDER — PROMETHAZINE HYDROCHLORIDE 25 MG/1
25 SUPPOSITORY RECTAL ONCE AS NEEDED
Status: DISCONTINUED | OUTPATIENT
Start: 2024-12-19 | End: 2024-12-19 | Stop reason: HOSPADM

## 2024-12-19 RX ORDER — PROMETHAZINE HYDROCHLORIDE 12.5 MG/1
25 TABLET ORAL ONCE AS NEEDED
Status: DISCONTINUED | OUTPATIENT
Start: 2024-12-19 | End: 2024-12-19 | Stop reason: HOSPADM

## 2024-12-19 RX ORDER — FENTANYL CITRATE 50 UG/ML
INJECTION, SOLUTION INTRAMUSCULAR; INTRAVENOUS AS NEEDED
Status: DISCONTINUED | OUTPATIENT
Start: 2024-12-19 | End: 2024-12-19 | Stop reason: SURG

## 2024-12-19 RX ORDER — LIDOCAINE HYDROCHLORIDE 20 MG/ML
INJECTION, SOLUTION EPIDURAL; INFILTRATION; INTRACAUDAL; PERINEURAL AS NEEDED
Status: DISCONTINUED | OUTPATIENT
Start: 2024-12-19 | End: 2024-12-19 | Stop reason: SURG

## 2024-12-19 RX ORDER — PROPOFOL 10 MG/ML
VIAL (ML) INTRAVENOUS AS NEEDED
Status: DISCONTINUED | OUTPATIENT
Start: 2024-12-19 | End: 2024-12-19 | Stop reason: SURG

## 2024-12-19 RX ORDER — OXYCODONE HYDROCHLORIDE 5 MG/1
5 TABLET ORAL
Status: DISCONTINUED | OUTPATIENT
Start: 2024-12-19 | End: 2024-12-19 | Stop reason: HOSPADM

## 2024-12-19 RX ORDER — METOCLOPRAMIDE HYDROCHLORIDE 5 MG/ML
10 INJECTION INTRAMUSCULAR; INTRAVENOUS
Status: COMPLETED | OUTPATIENT
Start: 2024-12-19 | End: 2024-12-19

## 2024-12-19 RX ORDER — ONDANSETRON 2 MG/ML
INJECTION INTRAMUSCULAR; INTRAVENOUS AS NEEDED
Status: DISCONTINUED | OUTPATIENT
Start: 2024-12-19 | End: 2024-12-19 | Stop reason: SURG

## 2024-12-19 RX ORDER — MIDAZOLAM HYDROCHLORIDE 2 MG/2ML
2 INJECTION, SOLUTION INTRAMUSCULAR; INTRAVENOUS ONCE
Status: COMPLETED | OUTPATIENT
Start: 2024-12-19 | End: 2024-12-19

## 2024-12-19 RX ORDER — PROMETHAZINE HYDROCHLORIDE 12.5 MG/1
12.5 TABLET ORAL ONCE AS NEEDED
Status: DISCONTINUED | OUTPATIENT
Start: 2024-12-19 | End: 2024-12-19 | Stop reason: HOSPADM

## 2024-12-19 RX ORDER — OXYCODONE AND ACETAMINOPHEN 5; 325 MG/1; MG/1
1 TABLET ORAL EVERY 6 HOURS PRN
Qty: 12 TABLET | Refills: 0 | Status: SHIPPED | OUTPATIENT
Start: 2024-12-19

## 2024-12-19 RX ORDER — ACETAMINOPHEN 500 MG
1000 TABLET ORAL ONCE
Status: COMPLETED | OUTPATIENT
Start: 2024-12-19 | End: 2024-12-19

## 2024-12-19 RX ORDER — SODIUM CHLORIDE 9 MG/ML
40 INJECTION, SOLUTION INTRAVENOUS AS NEEDED
Status: DISCONTINUED | OUTPATIENT
Start: 2024-12-19 | End: 2024-12-19 | Stop reason: HOSPADM

## 2024-12-19 RX ORDER — SODIUM CHLORIDE 0.9 % (FLUSH) 0.9 %
10 SYRINGE (ML) INJECTION EVERY 12 HOURS SCHEDULED
Status: DISCONTINUED | OUTPATIENT
Start: 2024-12-19 | End: 2024-12-19 | Stop reason: HOSPADM

## 2024-12-19 RX ORDER — ALBUTEROL SULFATE 0.83 MG/ML
SOLUTION RESPIRATORY (INHALATION) AS NEEDED
Status: DISCONTINUED | OUTPATIENT
Start: 2024-12-19 | End: 2024-12-19 | Stop reason: SURG

## 2024-12-19 RX ORDER — GLYCOPYRROLATE 0.2 MG/ML
INJECTION INTRAMUSCULAR; INTRAVENOUS AS NEEDED
Status: DISCONTINUED | OUTPATIENT
Start: 2024-12-19 | End: 2024-12-19 | Stop reason: SURG

## 2024-12-19 RX ORDER — SODIUM CHLORIDE, SODIUM LACTATE, POTASSIUM CHLORIDE, CALCIUM CHLORIDE 600; 310; 30; 20 MG/100ML; MG/100ML; MG/100ML; MG/100ML
20 INJECTION, SOLUTION INTRAVENOUS CONTINUOUS PRN
Status: DISCONTINUED | OUTPATIENT
Start: 2024-12-19 | End: 2024-12-19 | Stop reason: HOSPADM

## 2024-12-19 RX ORDER — ONDANSETRON 2 MG/ML
4 INJECTION INTRAMUSCULAR; INTRAVENOUS ONCE AS NEEDED
Status: DISCONTINUED | OUTPATIENT
Start: 2024-12-19 | End: 2024-12-19 | Stop reason: HOSPADM

## 2024-12-19 RX ORDER — PHENYLEPHRINE HCL IN 0.9% NACL 0.5 MG/5ML
SYRINGE (ML) INTRAVENOUS AS NEEDED
Status: DISCONTINUED | OUTPATIENT
Start: 2024-12-19 | End: 2024-12-19 | Stop reason: SURG

## 2024-12-19 RX ADMIN — DEXAMETHASONE SODIUM PHOSPHATE 4 MG: 4 INJECTION, SOLUTION INTRAMUSCULAR; INTRAVENOUS at 13:50

## 2024-12-19 RX ADMIN — OXYCODONE HYDROCHLORIDE 5 MG: 5 TABLET ORAL at 14:58

## 2024-12-19 RX ADMIN — DEXMEDETOMIDINE HYDROCHLORIDE 10 MCG: 100 INJECTION, SOLUTION, CONCENTRATE INTRAVENOUS at 14:04

## 2024-12-19 RX ADMIN — Medication 200 MCG: at 13:51

## 2024-12-19 RX ADMIN — ONDANSETRON 4 MG: 2 INJECTION INTRAMUSCULAR; INTRAVENOUS at 13:50

## 2024-12-19 RX ADMIN — FENTANYL CITRATE 50 MCG: 50 INJECTION, SOLUTION INTRAMUSCULAR; INTRAVENOUS at 13:40

## 2024-12-19 RX ADMIN — FENTANYL CITRATE 50 MCG: 50 INJECTION, SOLUTION INTRAMUSCULAR; INTRAVENOUS at 13:29

## 2024-12-19 RX ADMIN — SODIUM CHLORIDE, POTASSIUM CHLORIDE, SODIUM LACTATE AND CALCIUM CHLORIDE 20 ML/HR: 600; 310; 30; 20 INJECTION, SOLUTION INTRAVENOUS at 12:17

## 2024-12-19 RX ADMIN — SODIUM CHLORIDE 2000 MG: 9 INJECTION, SOLUTION INTRAVENOUS at 13:30

## 2024-12-19 RX ADMIN — DEXMEDETOMIDINE HYDROCHLORIDE 10 MCG: 100 INJECTION, SOLUTION, CONCENTRATE INTRAVENOUS at 14:06

## 2024-12-19 RX ADMIN — ALBUTEROL SULFATE 2.5 MG: 2.5 SOLUTION RESPIRATORY (INHALATION) at 14:10

## 2024-12-19 RX ADMIN — PROPOFOL 200 MG: 10 INJECTION, EMULSION INTRAVENOUS at 13:40

## 2024-12-19 RX ADMIN — LIDOCAINE HYDROCHLORIDE 100 MG: 20 INJECTION, SOLUTION INTRAVENOUS at 13:40

## 2024-12-19 RX ADMIN — SCOPALAMINE 1 PATCH: 1 PATCH, EXTENDED RELEASE TRANSDERMAL at 12:18

## 2024-12-19 RX ADMIN — Medication 200 MCG: at 13:53

## 2024-12-19 RX ADMIN — DEXMEDETOMIDINE HYDROCHLORIDE 10 MCG: 100 INJECTION, SOLUTION, CONCENTRATE INTRAVENOUS at 14:40

## 2024-12-19 RX ADMIN — DEXMEDETOMIDINE HYDROCHLORIDE 10 MCG: 100 INJECTION, SOLUTION, CONCENTRATE INTRAVENOUS at 14:08

## 2024-12-19 RX ADMIN — METOCLOPRAMIDE 10 MG: 5 INJECTION, SOLUTION INTRAMUSCULAR; INTRAVENOUS at 13:20

## 2024-12-19 RX ADMIN — DEXMEDETOMIDINE HYDROCHLORIDE 10 MCG: 100 INJECTION, SOLUTION, CONCENTRATE INTRAVENOUS at 13:31

## 2024-12-19 RX ADMIN — MIDAZOLAM HYDROCHLORIDE 2 MG: 1 INJECTION, SOLUTION INTRAMUSCULAR; INTRAVENOUS at 13:20

## 2024-12-19 RX ADMIN — Medication 200 MCG: at 13:55

## 2024-12-19 RX ADMIN — GLYCOPYRROLATE 0.2 MG: 0.2 INJECTION INTRAMUSCULAR; INTRAVENOUS at 13:57

## 2024-12-19 RX ADMIN — DEXMEDETOMIDINE HYDROCHLORIDE 10 MCG: 100 INJECTION, SOLUTION, CONCENTRATE INTRAVENOUS at 14:36

## 2024-12-19 RX ADMIN — ACETAMINOPHEN 1000 MG: 500 TABLET ORAL at 12:15

## 2024-12-19 NOTE — ANESTHESIA PREPROCEDURE EVALUATION
Anesthesia Evaluation     Patient summary reviewed and Nursing notes reviewed   history of anesthetic complications:  PONV  NPO Solid Status: > 8 hours  NPO Liquid Status: > 2 hours           Airway   Mallampati: II  TM distance: >3 FB  Neck ROM: full  No difficulty expected  Dental      Pulmonary - negative pulmonary ROS and normal exam    breath sounds clear to auscultation  Cardiovascular - negative cardio ROS and normal exam  Exercise tolerance: good (4-7 METS)    Rhythm: regular  Rate: normal        Neuro/Psych  (+) psychiatric history Anxiety and Depression  GI/Hepatic/Renal/Endo    (+) GERD, renal disease- stones, diabetes mellitus type 2, thyroid problem hypothyroidism    Musculoskeletal     (+) back pain  Abdominal    Substance History - negative use     OB/GYN negative ob/gyn ROS         Other   arthritis,     ROS/Med Hx Other: PAT Nursing Notes unavailable.               Anesthesia Plan    ASA 2     general     (Patient understands anesthesia not responsible for dental damage.)  intravenous induction     Anesthetic plan, risks, benefits, and alternatives have been provided, discussed and informed consent has been obtained with: patient.    Use of blood products discussed with patient .    Plan discussed with CRNA.    CODE STATUS:

## 2024-12-19 NOTE — ANESTHESIA POSTPROCEDURE EVALUATION
Patient: Rosibel BARILLAS Plaleixa    Procedure Summary       Date: 12/19/24 Room / Location: [facility] OR  / [facility] MAIN OR    Anesthesia Start: 1329 Anesthesia Stop: 1447    Procedure: URETEROSCOPY WITH STENT INSERTION  Scope of the bladder and right ureter (tube from kidney to bladder) with laser to break up stone and removal of stone fragments with placement of right ureteral stent from kidney to bladder (Right) Diagnosis:       Kidney stone      (Kidney stone [N20.0])    Surgeons: Heather Warren MD Provider: David Fisher MD    Anesthesia Type: general ASA Status: 2            Anesthesia Type: general    Vitals  Vitals Value Taken Time   /76 12/19/24 1525   Temp 36.3 °C (97.3 °F) 12/19/24 1525   Pulse 73 12/19/24 1529   Resp 14 12/19/24 1510   SpO2 94 % 12/19/24 1528   Vitals shown include unfiled device data.        Post Anesthesia Care and Evaluation    Patient location during evaluation: bedside  Patient participation: complete - patient participated  Level of consciousness: awake    Airway patency: patent  PONV Status: none  Cardiovascular status: acceptable  Respiratory status: acceptable  Hydration status: acceptable

## 2024-12-19 NOTE — OP NOTE
URETEROSCOPY LASER LITHOTRIPSY WITH STENT INSERTION  Procedure Report    Patient Name:  Rosibel Baker  YOB: 1975    Date of Surgery:  12/19/2024     Pre-op Diagnosis:   Kidney stone [N20.0]       Post-Op Diagnosis Codes:     * Kidney stone [N20.0]      Procedure/CPT® Codes:    Procedure(s):  LEFT URETEROSCOPY, STONE BASKET EXTRACTION WITH LEFT URETERAL STENT INSERTION        Staff:  Surgeon(s):  Heather Warren MD         Anesthesia: General    Estimated Blood Loss: none    Implants:    Implant Name Type Inv. Item Serial No.  Lot No. LRB No. Used Action   STNT URETRL ASCERTA 6F 26CM - [id number] Stent STNT URETRL ASCERTA 6F 26CM  [id number] Right 1 Implanted       Specimen:          None        Complications: None    Description of Procedure:     After proper consent was obtained, patient was taken to operating room and placed in the dorsal lithotomy position.  The patient was prepped and draped in the normal sterile fashion for a left ureteroscopy.      A 22 Nauruan rigid cystoscopy was passed per urethra into the bladder.  The bladder was inspected in a systemic meridian fashion.  No stones, tumors or other abnormalities were seen.      A glide wire was passed up the left ureteral orifice without difficulty.  A dual lumen catheter was placed and a second wire was placed as a safety wire.  Over the working wire a ureteral access sheath was passed.  A flexible scope was then passed into the ureter.  There were several fibrinous pieces encountered in the upper pole.  I removed them all.  There was one small stone in the lower pole that I removed as well.      There was no evidence of injury to the ureter.  The ureteroscope was removed.  Over the wire, a 6 Nauruan 26 cm stent was passed.  Under fluoroscopy it was seen curling in the renal pelvis and under cystoscopy was seen curling in the bladder.  The cystoscope was removed.      A string was left on the stent.      The  patient tolerated the procedure well and was transferred to the PACU in stable condition.        Heather Warren MD     Date: 12/19/2024  Time: 15:30 EST

## 2024-12-19 NOTE — DISCHARGE INSTRUCTIONS
DISCHARGE INSTRUCTIONS  CYSTOSCOPY STENT PLACEMENT/EXCHANGE      For your surgery you had:  General anesthesia (you may have a sore throat for the first 24 hours)  You received a medicated path for nausea prevention today (behind your ear). It is recommended that you remove it 24-48 hours post-operatively. It must be removed within 72 hours.   You may experience dizziness, drowsiness, or lightheadedness for several hours following surgery.  Do not stay alone today or tonight.  Limit your activity for 24 hours.  You should not drive or operate machinery, drink alcohol, or sign legally binding documents for 24 hours or while you are taking pain medication.  Resume your diet slowly.  Follow any special dietary instructions you may have been given by your doctor.     NOTIFY YOUR DOCTOR IF YOU EXPERIENCE ANY OF THE FOLLOWING:  Temperature greater than 101 degrees Fahrenheit  Shaking Chills  Redness or excessive drainage from incision  Nausea, vomiting and/or pain that is not controlled by prescribed medications  Increase in bleeding or bleeding that is excessive  Unable to urinate in 6 hours after surgery  If unable to reach your doctor, please go to the closest Emergency Room  Strain urine if instructed by physician.  Collect any fragments and take with you on your scheduled appointment. You may pass small blood clots.  Blood in your urine is normal.  It could be light pink to cherry color. Urine will be bloody for several days.  Drink 6-8 glasses of fluid each day to assist with flushing kidneys.  Back pain is common.  It may feel like a dull ache or back spasm. Most of the time pain may decrease a little with medication, but will not go away until stent is removed.  Slight redness or bruising may be noticed on treated side.  If you have difficulty urinating, try sitting in a bathtub of warm water.    If you have a stent, it must be managed by your urologist.  Do NOT forget.  Medications per physician instructions as  indicated on After Visit Summary.    Last dose of pain medication was given at:  Tylenol 1000mg at 1215 do not exceed 4000mg in 24hours  Oxycodone 5mg at 2:58 MAY REPEAT 8:58 PM .    SPECIAL INSTRUCTIONS:  See after visit summary    Patient to remove stent at home in 5 days unless otherwise instructed

## 2025-01-07 ENCOUNTER — TELEPHONE (OUTPATIENT)
Dept: UROLOGY | Age: 50
End: 2025-01-07
Payer: COMMERCIAL

## 2025-01-07 NOTE — TELEPHONE ENCOUNTER
Spoke to patient. Confirmed that stent was removed by patient and does not have any problems or concerns. Patient has follow up in December 2025 and does not need/wish to reschedule.

## 2025-03-13 DIAGNOSIS — M25.562 ARTHRALGIA OF BOTH KNEES: ICD-10-CM

## 2025-03-13 DIAGNOSIS — G89.29 CHRONIC LOW BACK PAIN, UNSPECIFIED BACK PAIN LATERALITY, UNSPECIFIED WHETHER SCIATICA PRESENT: ICD-10-CM

## 2025-03-13 DIAGNOSIS — M54.50 CHRONIC LOW BACK PAIN, UNSPECIFIED BACK PAIN LATERALITY, UNSPECIFIED WHETHER SCIATICA PRESENT: ICD-10-CM

## 2025-03-13 DIAGNOSIS — M25.561 ARTHRALGIA OF BOTH KNEES: ICD-10-CM

## 2025-03-17 RX ORDER — TIZANIDINE 2 MG/1
2 TABLET ORAL EVERY 8 HOURS PRN
Qty: 30 TABLET | Refills: 2 | Status: SHIPPED | OUTPATIENT
Start: 2025-03-17

## 2025-05-07 ENCOUNTER — TELEPHONE (OUTPATIENT)
Dept: FAMILY MEDICINE CLINIC | Age: 50
End: 2025-05-07
Payer: COMMERCIAL

## 2025-05-22 ENCOUNTER — LAB (OUTPATIENT)
Dept: LAB | Facility: HOSPITAL | Age: 50
End: 2025-05-22
Payer: COMMERCIAL

## 2025-05-22 DIAGNOSIS — E03.9 HYPOTHYROIDISM, UNSPECIFIED TYPE: ICD-10-CM

## 2025-05-22 DIAGNOSIS — Z13.6 SCREENING FOR CARDIOVASCULAR CONDITION: ICD-10-CM

## 2025-05-22 LAB
ALBUMIN SERPL-MCNC: 4.1 G/DL (ref 3.5–5.2)
ALBUMIN/GLOB SERPL: 1.3 G/DL
ALP SERPL-CCNC: 103 U/L (ref 39–117)
ALT SERPL W P-5'-P-CCNC: 10 U/L (ref 1–33)
ANION GAP SERPL CALCULATED.3IONS-SCNC: 16 MMOL/L (ref 5–15)
AST SERPL-CCNC: 20 U/L (ref 1–32)
BILIRUB SERPL-MCNC: 0.2 MG/DL (ref 0–1.2)
BUN SERPL-MCNC: 22 MG/DL (ref 6–20)
BUN/CREAT SERPL: 14.8 (ref 7–25)
CALCIUM SPEC-SCNC: 9.6 MG/DL (ref 8.6–10.5)
CHLORIDE SERPL-SCNC: 101 MMOL/L (ref 98–107)
CHOLEST SERPL-MCNC: 186 MG/DL (ref 0–200)
CO2 SERPL-SCNC: 21 MMOL/L (ref 22–29)
CREAT SERPL-MCNC: 1.49 MG/DL (ref 0.57–1)
EGFRCR SERPLBLD CKD-EPI 2021: 42.4 ML/MIN/1.73
GLOBULIN UR ELPH-MCNC: 3.1 GM/DL
GLUCOSE SERPL-MCNC: 175 MG/DL (ref 65–99)
HDLC SERPL-MCNC: 38 MG/DL (ref 40–60)
LDLC SERPL CALC-MCNC: 110 MG/DL (ref 0–100)
LDLC/HDLC SERPL: 2.76 {RATIO}
POTASSIUM SERPL-SCNC: 4.8 MMOL/L (ref 3.5–5.2)
PROT SERPL-MCNC: 7.2 G/DL (ref 6–8.5)
SODIUM SERPL-SCNC: 138 MMOL/L (ref 136–145)
TRIGL SERPL-MCNC: 216 MG/DL (ref 0–150)
TSH SERPL DL<=0.05 MIU/L-ACNC: 0.55 UIU/ML (ref 0.27–4.2)
VLDLC SERPL-MCNC: 38 MG/DL (ref 5–40)

## 2025-05-22 PROCEDURE — 84443 ASSAY THYROID STIM HORMONE: CPT

## 2025-05-22 PROCEDURE — 80053 COMPREHEN METABOLIC PANEL: CPT

## 2025-05-22 PROCEDURE — 36415 COLL VENOUS BLD VENIPUNCTURE: CPT

## 2025-05-22 PROCEDURE — 80061 LIPID PANEL: CPT

## 2025-06-04 DIAGNOSIS — E03.9 HYPOTHYROIDISM, UNSPECIFIED TYPE: ICD-10-CM

## 2025-06-10 RX ORDER — LEVOTHYROXINE SODIUM 137 UG/1
137 TABLET ORAL DAILY
Qty: 90 TABLET | Refills: 0 | Status: SHIPPED | OUTPATIENT
Start: 2025-06-10

## 2025-06-23 ENCOUNTER — TELEPHONE (OUTPATIENT)
Dept: FAMILY MEDICINE CLINIC | Age: 50
End: 2025-06-23
Payer: COMMERCIAL

## 2025-07-01 DIAGNOSIS — F34.1 DYSTHYMIC DISORDER: Chronic | ICD-10-CM

## 2025-07-02 RX ORDER — DULOXETIN HYDROCHLORIDE 60 MG/1
60 CAPSULE, DELAYED RELEASE ORAL DAILY
Qty: 90 CAPSULE | Refills: 0 | Status: SHIPPED | OUTPATIENT
Start: 2025-07-02

## 2025-07-03 ENCOUNTER — LAB (OUTPATIENT)
Dept: LAB | Facility: HOSPITAL | Age: 50
End: 2025-07-03
Payer: COMMERCIAL

## 2025-07-03 DIAGNOSIS — R73.03 PREDIABETES: ICD-10-CM

## 2025-07-03 DIAGNOSIS — N28.9 ABNORMAL KIDNEY FUNCTION: ICD-10-CM

## 2025-07-03 LAB
ALBUMIN SERPL-MCNC: 4.3 G/DL (ref 3.5–5.2)
ANION GAP SERPL CALCULATED.3IONS-SCNC: 11.2 MMOL/L (ref 5–15)
BACTERIA UR QL AUTO: ABNORMAL /HPF
BILIRUB UR QL STRIP: NEGATIVE
BUN SERPL-MCNC: 21 MG/DL (ref 6–20)
BUN/CREAT SERPL: 12.9 (ref 7–25)
CALCIUM SPEC-SCNC: 10 MG/DL (ref 8.6–10.5)
CHLORIDE SERPL-SCNC: 103 MMOL/L (ref 98–107)
CLARITY UR: CLEAR
CO2 SERPL-SCNC: 22.8 MMOL/L (ref 22–29)
COLOR UR: YELLOW
CREAT SERPL-MCNC: 1.63 MG/DL (ref 0.57–1)
EGFRCR SERPLBLD CKD-EPI 2021: 38 ML/MIN/1.73
GLUCOSE SERPL-MCNC: 93 MG/DL (ref 65–99)
GLUCOSE UR STRIP-MCNC: NEGATIVE MG/DL
HBA1C MFR BLD: 5.9 % (ref 4.8–5.6)
HGB UR QL STRIP.AUTO: NEGATIVE
KETONES UR QL STRIP: NEGATIVE
LEUKOCYTE ESTERASE UR QL STRIP.AUTO: NEGATIVE
NITRITE UR QL STRIP: NEGATIVE
PH UR STRIP.AUTO: 6 [PH] (ref 5–8)
PHOSPHATE SERPL-MCNC: 3.6 MG/DL (ref 2.5–4.5)
POTASSIUM SERPL-SCNC: 5.2 MMOL/L (ref 3.5–5.2)
PROT UR QL STRIP: NEGATIVE
RBC # UR STRIP: ABNORMAL /HPF
REF LAB TEST METHOD: ABNORMAL
SODIUM SERPL-SCNC: 137 MMOL/L (ref 136–145)
SP GR UR STRIP: 1.02 (ref 1–1.03)
SQUAMOUS #/AREA URNS HPF: ABNORMAL /HPF
UROBILINOGEN UR QL STRIP: NORMAL
WBC # UR STRIP: ABNORMAL /HPF

## 2025-07-03 PROCEDURE — 36415 COLL VENOUS BLD VENIPUNCTURE: CPT

## 2025-07-03 PROCEDURE — 80069 RENAL FUNCTION PANEL: CPT

## 2025-07-03 PROCEDURE — 83036 HEMOGLOBIN GLYCOSYLATED A1C: CPT

## 2025-07-03 PROCEDURE — 81001 URINALYSIS AUTO W/SCOPE: CPT

## 2025-07-03 PROCEDURE — 87086 URINE CULTURE/COLONY COUNT: CPT

## 2025-07-04 LAB — BACTERIA SPEC AEROBE CULT: NORMAL

## 2025-07-10 DIAGNOSIS — N20.0 KIDNEY STONE: ICD-10-CM

## 2025-07-10 DIAGNOSIS — N28.9 ABNORMAL KIDNEY FUNCTION: Primary | ICD-10-CM

## 2025-07-14 ENCOUNTER — TELEPHONE (OUTPATIENT)
Dept: FAMILY MEDICINE CLINIC | Age: 50
End: 2025-07-14
Payer: COMMERCIAL

## 2025-07-14 NOTE — TELEPHONE ENCOUNTER
Caller: [employer]    Relationship: Other    Best call back number: [phone]    What orders are you requesting (i.e. lab or imaging): CT SCAN ABDOMEN     In what timeframe would the patient need to come in: PATIENT HAS AN APPOINTMENT SCHEDULED FOR FRIDAY 12:45 PM    Where will you receive your lab/imaging services: [facility]   FAX# [phone]    Additional notes: LOCATION WHERE ORIGINAL ORDERS WERE SENT WERE DENIED BY INSURANCE BUT WILL BE COVERED AT [facility]

## 2025-07-15 ENCOUNTER — OFFICE VISIT (OUTPATIENT)
Dept: FAMILY MEDICINE CLINIC | Age: 50
End: 2025-07-15
Payer: COMMERCIAL

## 2025-07-15 VITALS
SYSTOLIC BLOOD PRESSURE: 124 MMHG | OXYGEN SATURATION: 96 % | WEIGHT: 258.4 LBS | TEMPERATURE: 98.6 F | DIASTOLIC BLOOD PRESSURE: 79 MMHG | HEART RATE: 104 BPM | HEIGHT: 66 IN | BODY MASS INDEX: 41.53 KG/M2

## 2025-07-15 DIAGNOSIS — R63.5 WEIGHT GAIN: ICD-10-CM

## 2025-07-15 DIAGNOSIS — N28.9 ABNORMAL KIDNEY FUNCTION: ICD-10-CM

## 2025-07-15 DIAGNOSIS — E03.9 HYPOTHYROIDISM, UNSPECIFIED TYPE: Primary | ICD-10-CM

## 2025-07-15 PROCEDURE — 99214 OFFICE O/P EST MOD 30 MIN: CPT | Performed by: NURSE PRACTITIONER

## 2025-07-15 NOTE — PROGRESS NOTES
Chief Complaint  Rosibel Baker presents to [facility] for Dysthymic disorder , Hypothyroidism, Prediabetes, Anxiety (RASHIDA - 15), and Vaginitis (Has used OTC monistat with no relief. )    Subjective     Primary Care Follow-Up  Conditions present: other and thyroid disorder    Current symptoms: fatigue, weight gain, headaches and cold intolerance      Current symptoms: no chest pain, no confusion, no dizziness, no dry mouth, no nausea, no palpitations, no shortness of breath, no weight loss, no blurred vision, no hoarse voice, no constipation, no hair loss, no heat intolerance, no menstrual problem, no globus sensation, no trouble swallowing and no mass symptoms    Side effects:  Fatigue, headaches, joint pain, weight gain and urinary  Treatment compliance:  All of the time  Treatment barriers:  No complaince problems  Exercise:  Rarely  Thyroid Disorder:     Additional thyroid condition information:  Symptomatic  Other:     Additional other condition information:  Kidney follow up    Rosibel is in today to follow-up on chronic conditions.    Still feeling very fatigued, having problems with weight again and feels that she is overall doing everything she needs to do to try to get the weight off.      We are also following up on recent kidney labs- her kidney function has been impaired- it is of note that she has had some obstructing stones in the past- I did reach out to urology to ensure they were ok with referral to nephrology .  She would like a repeat CT to ensure not from an obstructive stone before referral placed.  This is scheduled  Assessment and Plan     Diagnoses and all orders for this visit:    1. Hypothyroidism, unspecified type (Primary)  Comments:  will check levels  Orders:  -     T3, free; Future  -     T4, free; Future  -     T3, Reverse; Future  -     Thyroid Antibodies; Future  -     TSH; Future    2. Weight gain  Comments:  consider adrenal malfunction/ advise on  "adequate sleep, well balanced diet , lifestyle modifications  Orders:  -     Cortisol - AM; Future  -     Comprehensive metabolic panel; Future  -     Insulin, Total; Future    3. Abnormal kidney function  Comments:  ct pending  anticipate referral to nephrology if no obstruction noted  Orders:  -     Comprehensive metabolic panel; Future            Follow Up   Return in about 6 months (around 1/15/2026) for Pending imaging results.  Future Appointments   Date Time Provider Department Center   12/5/2025 11:00 AM Heather Warren MD [facility]   1/16/2026  2:30 PM Nayely Cruz APRN [facility]       No orders of the defined types were placed in this encounter.      Medications Discontinued During This Encounter   Medication Reason    oxyCODONE-acetaminophen (PERCOCET) 5-325 MG per tablet *Therapy completed    ondansetron ODT (ZOFRAN-ODT) 4 MG disintegrating tablet *Therapy completed          Review of Systems   Constitutional:  Positive for fatigue and unexpected weight gain. Negative for unexpected weight loss.   HENT:  Negative for hoarse voice and trouble swallowing.    Eyes:  Negative for blurred vision.   Respiratory:  Negative for shortness of breath.    Cardiovascular:  Negative for chest pain and palpitations.   Gastrointestinal:  Negative for constipation and nausea.   Endocrine: Positive for cold intolerance. Negative for heat intolerance.   Genitourinary:  Negative for menstrual problem.   Neurological:  Negative for dizziness and confusion.       Objective     Vitals:    07/15/25 1135   BP: 124/79   BP Location: Left arm   Patient Position: Sitting   Cuff Size: Large Adult   Pulse: 104   Temp: 98.6 °F (37 °C)   TempSrc: Oral   SpO2: 96%   Weight: 117 kg (258 lb 6.4 oz)   Height: 167.6 cm (66\")         Physical Exam  Constitutional:       General: She is not in acute distress.     Appearance: Normal appearance. She is obese.   HENT:      Head: Normocephalic.   Cardiovascular:      Rate and " Rhythm: Normal rate and regular rhythm.   Pulmonary:      Effort: Pulmonary effort is normal.      Breath sounds: Normal breath sounds.   Musculoskeletal:         General: Normal range of motion.   Neurological:      General: No focal deficit present.      Mental Status: She is alert and oriented to person, place, and time.   Psychiatric:         Mood and Affect: Mood normal.         Behavior: Behavior normal.               Result Review          Allergies   Allergen Reactions    Diclofenac Hives and Anaphylaxis     SEIZURE      Latex Hives and Rash    Erythromycin Nausea Only      Past Medical History:   Diagnosis Date    Anxiety and depression     Back pain     Cholelithiasis     Diabetes mellitus     Disease of thyroid gland     GERD (gastroesophageal reflux disease)     Headache     History of kidney stones     Hypothyroidism     Kidney stone 10/1/24    Low back pain     Medial meniscus tear     Obesity     Otitis media     Ovarian dysfunction     PONV (postoperative nausea and vomiting)     Rash     LONG TERM, LEFT BREAST. CLOSED    Seizure 2018    ONE INCIDENT WITH DICLOFENAC MEDICATION    Tobacco abuse     Vitamin D deficiency      Current Outpatient Medications   Medication Sig Dispense Refill    DULoxetine (CYMBALTA) 60 MG capsule TAKE ONE CAPSULE BY MOUTH EVERY DAY 90 capsule 0    estradiol (VIVELLE-DOT) 0.1 MG/24HR patch Place 1 patch on the skin as directed by provider 2 (Two) Times a Week. 24 each 3    levothyroxine (SYNTHROID, LEVOTHROID) 137 MCG tablet TAKE ONE TABLET BY MOUTH EVERY DAY 90 tablet 0    metFORMIN ER (GLUCOPHAGE-XR) 500 MG 24 hr tablet Take 1 tablet by mouth 2 (Two) Times a Day With Meals. 180 tablet 1    spironolactone (ALDACTONE) 100 MG tablet Take 1 tablet by mouth 2 (Two) Times a Day. 180 tablet 1    tiZANidine (ZANAFLEX) 2 MG tablet Take 1 tablet by mouth Every 8 (Eight) Hours As Needed for Muscle Spasms. 30 tablet 2     No current facility-administered medications for this visit.      Past Surgical History:   Procedure Laterality Date    ADENOIDECTOMY      CHOLECYSTECTOMY      COLONOSCOPY      CYSTOSCOPY, URETEROSCOPY, RETROGRADE PYELOGRAM, STONE EXTRACTION, STENT INSERTION Right 12/19/2024    Procedure: URETEROSCOPY WITH STENT INSERTION  Scope of the bladder and right ureter (tube from kidney to bladder) with laser to break up stone and removal of stone fragments with placement of right ureteral stent from kidney to bladder;  Surgeon: Heather Warren MD;  Location: [facility] MAIN OR;  Service: Urology;  Laterality: Right;    HYSTERECTOMY      JOINT REPLACEMENT  11/2/22    KNEE ARTHROPLASTY, PARTIAL REPLACEMENT Left 2023    KNEE ARTHROSCOPY Left 01/29/2021    Procedure: KNEE ARTHROSCOPY, PARTIAL MEDIAL MENISECTOMY;  Surgeon: Roly Godoy MD;  [facility]  Service: Orthopedics;  Laterality: Left;    POLYPECTOMY      UTERINE POLYP    THYROID SURGERY      ONLY RADIATION NO SURGERY    TONSILLECTOMY        Health Maintenance Due   Topic Date Due    Pneumococcal Vaccine 50+ (1 of 2 - PCV) Never done    ZOSTER VACCINE (1 of 2) Never done    MAMMOGRAM  08/10/2025    LUNG CANCER SCREENING  09/20/2025      Immunization History   Administered Date(s) Administered    COVID-19 (PFIZER) Purple Cap Monovalent 05/04/2021, 05/25/2021, 01/11/2022    Fluzone  >6mos 12/02/2020, 10/22/2024    Fluzone (or Fluarix & Flulaval for VFC) >6mos 01/11/2022, 02/23/2024    Fluzone Quad >6mos (Multi-dose) 12/02/2020    Influenza Injectable Mdck Pf Quad 01/11/2022    Influenza, Unspecified 10/19/2017, 01/11/2022         Part of this note may be an electronic transcription/translation of spoken language to printed   text using the Dragon Dictation System.      DAVID Tsang

## 2025-07-16 DIAGNOSIS — B37.9 YEAST INFECTION: Primary | ICD-10-CM

## 2025-07-16 RX ORDER — FLUCONAZOLE 150 MG/1
150 TABLET ORAL ONCE
Qty: 1 TABLET | Refills: 0 | Status: SHIPPED | OUTPATIENT
Start: 2025-07-16 | End: 2025-07-16

## 2025-07-29 ENCOUNTER — LAB (OUTPATIENT)
Dept: LAB | Facility: HOSPITAL | Age: 50
End: 2025-07-29
Payer: COMMERCIAL

## 2025-07-29 DIAGNOSIS — G89.29 CHRONIC LOW BACK PAIN, UNSPECIFIED BACK PAIN LATERALITY, UNSPECIFIED WHETHER SCIATICA PRESENT: ICD-10-CM

## 2025-07-29 DIAGNOSIS — E03.9 HYPOTHYROIDISM, UNSPECIFIED TYPE: ICD-10-CM

## 2025-07-29 DIAGNOSIS — N28.9 ABNORMAL KIDNEY FUNCTION: ICD-10-CM

## 2025-07-29 DIAGNOSIS — M25.561 ARTHRALGIA OF BOTH KNEES: ICD-10-CM

## 2025-07-29 DIAGNOSIS — M54.50 CHRONIC LOW BACK PAIN, UNSPECIFIED BACK PAIN LATERALITY, UNSPECIFIED WHETHER SCIATICA PRESENT: ICD-10-CM

## 2025-07-29 DIAGNOSIS — M25.562 ARTHRALGIA OF BOTH KNEES: ICD-10-CM

## 2025-07-29 DIAGNOSIS — R63.5 WEIGHT GAIN: ICD-10-CM

## 2025-07-29 LAB
ALBUMIN SERPL-MCNC: 4 G/DL (ref 3.5–5.2)
ALBUMIN UR-MCNC: <1.2 MG/DL
ALBUMIN/GLOB SERPL: 1.3 G/DL
ALP SERPL-CCNC: 77 U/L (ref 39–117)
ALT SERPL W P-5'-P-CCNC: 17 U/L (ref 1–33)
ANION GAP SERPL CALCULATED.3IONS-SCNC: 11.8 MMOL/L (ref 5–15)
AST SERPL-CCNC: 26 U/L (ref 1–32)
BACTERIA UR QL AUTO: ABNORMAL /HPF
BILIRUB SERPL-MCNC: 0.3 MG/DL (ref 0–1.2)
BILIRUB UR QL STRIP: NEGATIVE
BUN SERPL-MCNC: 32 MG/DL (ref 6–20)
BUN/CREAT SERPL: 22.2 (ref 7–25)
CALCIUM SPEC-SCNC: 9.6 MG/DL (ref 8.6–10.5)
CHLORIDE SERPL-SCNC: 107 MMOL/L (ref 98–107)
CLARITY UR: CLEAR
CO2 SERPL-SCNC: 18.2 MMOL/L (ref 22–29)
COLOR UR: YELLOW
CREAT SERPL-MCNC: 1.44 MG/DL (ref 0.57–1)
CREAT UR-MCNC: 86.2 MG/DL
EGFRCR SERPLBLD CKD-EPI 2021: 44.1 ML/MIN/1.73
GLOBULIN UR ELPH-MCNC: 3 GM/DL
GLUCOSE SERPL-MCNC: 121 MG/DL (ref 65–99)
GLUCOSE UR STRIP-MCNC: NEGATIVE MG/DL
HGB UR QL STRIP.AUTO: NEGATIVE
KETONES UR QL STRIP: NEGATIVE
LEUKOCYTE ESTERASE UR QL STRIP.AUTO: NEGATIVE
MICROALBUMIN/CREAT UR: NORMAL MG/G{CREAT}
NITRITE UR QL STRIP: NEGATIVE
PH UR STRIP.AUTO: 6 [PH] (ref 5–8)
PHOSPHATE SERPL-MCNC: 3.5 MG/DL (ref 2.5–4.5)
POTASSIUM SERPL-SCNC: 4.5 MMOL/L (ref 3.5–5.2)
PROT SERPL-MCNC: 7 G/DL (ref 6–8.5)
PROT UR QL STRIP: NEGATIVE
RBC # UR STRIP: ABNORMAL /HPF
REF LAB TEST METHOD: ABNORMAL
SODIUM SERPL-SCNC: 137 MMOL/L (ref 136–145)
SP GR UR STRIP: 1.02 (ref 1–1.03)
SQUAMOUS #/AREA URNS HPF: ABNORMAL /HPF
T3FREE SERPL-MCNC: 2.5 PG/ML (ref 2–4.4)
T4 FREE SERPL-MCNC: 1.64 NG/DL (ref 0.92–1.68)
TSH SERPL DL<=0.05 MIU/L-ACNC: 0.78 UIU/ML (ref 0.27–4.2)
UROBILINOGEN UR QL STRIP: NORMAL
WBC # UR STRIP: ABNORMAL /HPF

## 2025-07-29 PROCEDURE — 84100 ASSAY OF PHOSPHORUS: CPT

## 2025-07-29 PROCEDURE — 82570 ASSAY OF URINE CREATININE: CPT

## 2025-07-29 PROCEDURE — 84443 ASSAY THYROID STIM HORMONE: CPT

## 2025-07-29 PROCEDURE — 86800 THYROGLOBULIN ANTIBODY: CPT

## 2025-07-29 PROCEDURE — 81001 URINALYSIS AUTO W/SCOPE: CPT

## 2025-07-29 PROCEDURE — 82043 UR ALBUMIN QUANTITATIVE: CPT

## 2025-07-29 PROCEDURE — 80053 COMPREHEN METABOLIC PANEL: CPT

## 2025-07-29 PROCEDURE — 86376 MICROSOMAL ANTIBODY EACH: CPT

## 2025-07-29 PROCEDURE — 84482 T3 REVERSE: CPT

## 2025-07-29 PROCEDURE — 83525 ASSAY OF INSULIN: CPT

## 2025-07-29 PROCEDURE — 36415 COLL VENOUS BLD VENIPUNCTURE: CPT

## 2025-07-29 PROCEDURE — 84439 ASSAY OF FREE THYROXINE: CPT

## 2025-07-29 PROCEDURE — 84481 FREE ASSAY (FT-3): CPT

## 2025-07-29 RX ORDER — TIZANIDINE 2 MG/1
2 TABLET ORAL EVERY 8 HOURS PRN
Qty: 30 TABLET | Refills: 2 | Status: SHIPPED | OUTPATIENT
Start: 2025-07-29

## 2025-07-31 LAB
INSULIN SERPL-ACNC: 33.6 UIU/ML (ref 2.6–24.9)
THYROGLOB AB SERPL-ACNC: <1 IU/ML (ref 0–0.9)
THYROPEROXIDASE AB SERPL-ACNC: <9 IU/ML (ref 0–34)

## 2025-08-04 LAB — T3REVERSE SERPL-MCNC: 19.7 NG/DL (ref 9.2–24.1)

## 2025-08-19 ENCOUNTER — TELEPHONE (OUTPATIENT)
Dept: ADMINISTRATIVE | Facility: OTHER | Age: 50
End: 2025-08-19
Payer: COMMERCIAL

## 2025-08-27 DIAGNOSIS — E03.9 HYPOTHYROIDISM, UNSPECIFIED TYPE: ICD-10-CM

## 2025-08-27 DIAGNOSIS — L73.2 HIDRADENITIS SUPPURATIVA: ICD-10-CM

## 2025-08-27 RX ORDER — LEVOTHYROXINE SODIUM 137 UG/1
137 TABLET ORAL DAILY
Qty: 90 TABLET | Refills: 0 | Status: SHIPPED | OUTPATIENT
Start: 2025-08-27

## 2025-08-27 RX ORDER — SPIRONOLACTONE 100 MG/1
100 TABLET, FILM COATED ORAL 2 TIMES DAILY
Qty: 180 TABLET | Refills: 0 | Status: SHIPPED | OUTPATIENT
Start: 2025-08-27

## (undated) DEVICE — SET, IRRIGATION CYSTO, Y-TYPE, 81": Brand: MEDLINE

## (undated) DEVICE — ENDOSCOPIC VALVE WITH ADAPTER.: Brand: SURSEAL® II

## (undated) DEVICE — BNDG ESMARK STRL 6INX12FT LF

## (undated) DEVICE — BASIC SINGLE BASIN-LF: Brand: MEDLINE INDUSTRIES, INC.

## (undated) DEVICE — APPL CHLORAPREP HI/LITE 26ML ORNG

## (undated) DEVICE — GW ZIPWIRE STD/SHFT STR TPR/3CM .035IN 150CM

## (undated) DEVICE — CYSTO PACK: Brand: MEDLINE INDUSTRIES, INC.

## (undated) DEVICE — GLOVE,SURG,SENSICARE SLT,LF,PF,6.5: Brand: MEDLINE

## (undated) DEVICE — BNDG ELAS ELITE V/CLOSE 6IN 5YD LF STRL

## (undated) DEVICE — UNDERCAST PADDING: Brand: DEROYAL

## (undated) DEVICE — BLD SHV DBL/CUT COOLCUT 4MM 13CM

## (undated) DEVICE — PAD,ABDOMINAL,8"X10",ST,LF: Brand: MEDLINE

## (undated) DEVICE — URETERAL ACCESS SHEATH SET: Brand: NAVIGATOR HD

## (undated) DEVICE — GLV SURG SIGNATURE ESSENTIAL PF LTX SZ8.5

## (undated) DEVICE — GOWN,REINFORCE,POLY,SIRUS,BREATH SLV,XLG: Brand: MEDLINE

## (undated) DEVICE — GW ZIPWIRE STD/SHFT STR TPR/3CM .038IN 150CM

## (undated) DEVICE — ABL ASP APOLLO RF XL 90D

## (undated) DEVICE — Device

## (undated) DEVICE — PK ARTHSCP 40

## (undated) DEVICE — GW ZIPWIRE STD/SHFT ANG TPR/3CM .038X150CM

## (undated) DEVICE — GLV SURG SENSICARE PI PF LF 7 GRN STRL

## (undated) DEVICE — TBG PUMP ARTHSCP MAIN AR6400 16FT

## (undated) DEVICE — SINGLE-USE DIGITAL FLEXIBLE URETEROSCOPE: Brand: LITHOVUE

## (undated) DEVICE — SYS IRR PUMP SGL ACTN VAC SYR 10CC

## (undated) DEVICE — CATH 2L URETRL HC 6F 50CM

## (undated) DEVICE — NITINOL STONE RETRIEVAL BASKET: Brand: ESCAPE

## (undated) DEVICE — NEEDLE, QUINCKE, 20GX3.5": Brand: MEDLINE

## (undated) DEVICE — ZIPPERED TOGA, 2X LARGE: Brand: FLYTE, SURGICOOL

## (undated) DEVICE — DISPOSABLE TOURNIQUET CUFF SINGLE BLADDER, SINGLE PORT AND QUICK CONNECT CONNECTOR: Brand: COLOR CUFF

## (undated) DEVICE — GLV SURG PREMIERPRO ORTHO LTX PF SZ8.5 BRN

## (undated) DEVICE — SKIN PREP TRAY W/CHG: Brand: MEDLINE INDUSTRIES, INC.

## (undated) DEVICE — ADHS SKIN DERMABOND TOP ADVANCED

## (undated) DEVICE — DRSNG WND GZ CURAD OIL EMULSION 3X3IN STRL